# Patient Record
Sex: MALE | Race: WHITE
[De-identification: names, ages, dates, MRNs, and addresses within clinical notes are randomized per-mention and may not be internally consistent; named-entity substitution may affect disease eponyms.]

---

## 2017-05-17 ENCOUNTER — HOSPITAL ENCOUNTER (OUTPATIENT)
Dept: HOSPITAL 47 - LABWHC1 | Age: 27
Discharge: HOME | End: 2017-05-17
Payer: MEDICARE

## 2017-05-17 DIAGNOSIS — G40.813: Primary | ICD-10-CM

## 2017-05-17 LAB
ALT SERPL-CCNC: 28 U/L (ref 21–72)
AST SERPL-CCNC: 23 U/L (ref 17–59)
BASOPHILS # BLD AUTO: 0 K/UL (ref 0–0.2)
BASOPHILS NFR BLD AUTO: 1 %
CH: 33.7
CHCM: 34.3
EOSINOPHIL # BLD AUTO: 0.2 K/UL (ref 0–0.7)
EOSINOPHIL NFR BLD AUTO: 4 %
ERYTHROCYTE [DISTWIDTH] IN BLOOD BY AUTOMATED COUNT: 3.95 M/UL (ref 4.3–5.9)
ERYTHROCYTE [DISTWIDTH] IN BLOOD: 16.3 % (ref 11.5–15.5)
HCT VFR BLD AUTO: 39 % (ref 39–53)
HDW: 3.69
HGB BLD-MCNC: 12.7 GM/DL (ref 13–17.5)
LUC NFR BLD AUTO: 2 %
LYMPHOCYTES # SPEC AUTO: 1.6 K/UL (ref 1–4.8)
LYMPHOCYTES NFR SPEC AUTO: 36 %
MCH RBC QN AUTO: 32.2 PG (ref 25–35)
MCHC RBC AUTO-ENTMCNC: 32.7 G/DL (ref 31–37)
MCV RBC AUTO: 98.6 FL (ref 80–100)
MONOCYTES # BLD AUTO: 0.4 K/UL (ref 0–1)
MONOCYTES NFR BLD AUTO: 8 %
NEUTROPHILS # BLD AUTO: 2.3 K/UL (ref 1.3–7.7)
NEUTROPHILS NFR BLD AUTO: 51 %
WBC # BLD AUTO: 0.07 10*3/UL
WBC # BLD AUTO: 4.5 K/UL (ref 3.8–10.6)
WBC (PEROX): 4.49

## 2017-05-17 PROCEDURE — 36415 COLL VENOUS BLD VENIPUNCTURE: CPT

## 2017-05-17 PROCEDURE — 85025 COMPLETE CBC W/AUTO DIFF WBC: CPT

## 2017-05-17 PROCEDURE — 84450 TRANSFERASE (AST) (SGOT): CPT

## 2017-05-17 PROCEDURE — 80177 DRUG SCRN QUAN LEVETIRACETAM: CPT

## 2017-05-17 PROCEDURE — 80164 ASSAY DIPROPYLACETIC ACD TOT: CPT

## 2017-05-17 PROCEDURE — 84460 ALANINE AMINO (ALT) (SGPT): CPT

## 2019-06-03 ENCOUNTER — HOSPITAL ENCOUNTER (EMERGENCY)
Dept: HOSPITAL 47 - EC | Age: 29
Discharge: HOME | End: 2019-06-03
Payer: MEDICARE

## 2019-06-03 VITALS
HEART RATE: 92 BPM | DIASTOLIC BLOOD PRESSURE: 53 MMHG | TEMPERATURE: 97.2 F | RESPIRATION RATE: 18 BRPM | SYSTOLIC BLOOD PRESSURE: 126 MMHG

## 2019-06-03 DIAGNOSIS — R41.82: ICD-10-CM

## 2019-06-03 DIAGNOSIS — G40.909: ICD-10-CM

## 2019-06-03 DIAGNOSIS — Z88.1: ICD-10-CM

## 2019-06-03 DIAGNOSIS — Z79.899: ICD-10-CM

## 2019-06-03 DIAGNOSIS — Z88.8: ICD-10-CM

## 2019-06-03 DIAGNOSIS — F84.0: ICD-10-CM

## 2019-06-03 DIAGNOSIS — J95.09: Primary | ICD-10-CM

## 2019-06-03 DIAGNOSIS — Z88.0: ICD-10-CM

## 2019-06-03 PROCEDURE — 99283 EMERGENCY DEPT VISIT LOW MDM: CPT

## 2019-06-03 NOTE — ED
General Adult HPI





- General


Chief complaint: Recheck/Abnormal Lab/Rx


Stated complaint: Pulled trac out


Time Seen by Provider: 06/03/19 16:55


Source: Caregiver


Mode of arrival: ambulatory


Limitations: altered mental status, physical limitation





- History of Present Illness


Initial comments: 





Patient is a 29-year-old male presenting to emergency department with his 

caregiver after pulling out his tracheostomy tube.  Caregiver notes the trach 

was removed at noon and she did not attempt to replace it..  Caregiver reports 

trach placement approximately 1 year ago.  Caregiver notes she brought a new 

tracheostomy to for replacement as she did not attempted replacement.  Caregiver

denies giving the patient any medication to alleviate his symptoms.  Caregiver 

denies decreased oxygen, shortness of breath, chest tightness, chest pain.  

Caregiver reports patient is acting at his baseline.





- Related Data


                                Home Medications











 Medication  Instructions  Recorded  Confirmed


 


Clobazam [Onfi] 10 mg PEG/G-TUBE BID 01/30/16 06/03/19


 


Lacosamide [Vimpat] 200 mg PEG/G-TUBE TID 01/30/16 06/03/19


 


Ascorbic Acid [Vitamin C] 500 mg PEG/G-TUBE BID 06/03/19 06/03/19


 


Banzel 400mg 1,600 mg PEG/G-TUBE BID 06/03/19 06/03/19


 


Folic Acid 1 mg PEG/G-TUBE DAILY 06/03/19 06/03/19


 


Magnesium Hydroxide [Milk of 4,800 mg PEG/G-TUBE MOWEFR 06/03/19 06/03/19





Magnesia]   


 


Metoprolol Tartrate [Lopressor] 25 mg PEG/G-TUBE BID 06/03/19 06/03/19


 


Pantoprazole Sodium [Protonix] 40 mg PEG/G-TUBE BID 06/03/19 06/03/19


 


Polyethylene Glycol 3350 [Miralax] 17 gm PEG/G-TUBE DAILY 06/03/19 06/03/19


 


Vitamin B Complex 1 cap PEG/G-TUBE DAILY 06/03/19 06/03/19


 


clonazePAM [KlonoPIN] 1 mg PEG/G-TUBE TID 06/03/19 06/03/19


 


levETIRAcetam [Keppra Oral 2,000 mg PEG/G-TUBE TID 06/03/19 06/03/19





Solution]   











                                    Allergies











Allergy/AdvReac Type Severity Reaction Status Date / Time


 


amoxicillin trihydrate Allergy  Unknown Verified 06/03/19 18:05





[From Augmentin]     


 


ceftriaxone sodium Allergy  Unknown Verified 06/03/19 18:05





[From Rocephin]     


 


cephalexin monohydrate Allergy  Unknown Verified 06/03/19 18:05





[From Keflex]     


 


Penicillins Allergy  Unknown Verified 06/03/19 18:05


 


phenobarbital Allergy  Unknown Verified 06/03/19 18:05


 


phenytoin sodium Allergy  Unknown Verified 06/03/19 18:05





[From Dilantin]     


 


phenytoin sodium extended Allergy  Unknown Verified 06/03/19 18:05





[From Dilantin]     


 


potassium clavulanate Allergy  Unknown Verified 06/03/19 18:05





[From Augmentin]     


 


Cephalosporins AdvReac  Unknown Verified 06/03/19 18:05


 


lorazepam [From Ativan] AdvReac  Unknown Verified 06/03/19 18:05














Review of Systems


ROS Statement: 


Those systems with pertinent positive or pertinent negative responses have been 

documented in the HPI.





ROS Other: All systems not noted in ROS Statement are negative.





Past Medical History


Past Medical History: Asthma, Respiratory Disorder, Seizure Disorder


Additional Past Medical History / Comment(s): developmental delay, autism


History of Any Multi-Drug Resistant Organisms: None Reported


Additional Past Surgical History / Comment(s): vagal nerve stimulator 06/2015, 

left ureter stent, embolist surgery, heel cord lengthening


Past Psychological History: ADD/ADHD


Smoking Status: Never smoker


Past Alcohol Use History: None Reported


Past Drug Use History: None Reported





General Exam


Limitations: altered mental status, physical limitation


General appearance: alert, in no apparent distress


Head exam: Present: atraumatic, normocephalic, normal inspection


Eye exam: Present: normal appearance, PERRL, EOMI


Pupils: Present: normal accommodation


Neck exam: Present: other (Tracheostomy)


Respiratory exam: Present: normal lung sounds bilaterally


Cardiovascular Exam: Present: regular rate, normal rhythm, normal heart sounds


Neurological exam: Present: alert, oriented X3


Psychiatric exam: Present: normal affect, normal mood


Skin exam: Present: warm, intact, normal color





Course





                                   Vital Signs











  06/03/19





  16:14


 


Temperature 97.2 F L


 


Pulse Rate 92


 


Respiratory 18





Rate 


 


Blood Pressure 126/53


 


O2 Sat by Pulse 98





Oximetry 














Procedures





- Procedures


Initial comment: 





Tracheostomy tube placement by respiratory.





Medical Decision Making





- Medical Decision Making





Patient is a 29-year-old male presenting to emergency department with his 

caregiver after pulling out his stool.  Tracheostomy tube replacement was 

performed by Lulu from respiratory department.  Patient will be discharged as he

is acting at his baseline.  Caregiver advised to follow-up with primary care.  

Caregiver advised to return to emergency department if symptoms worsen.  Case 

discussed with physician.





Disposition


Clinical Impression: 


 Tracheostomy complication, unspecified





Disposition: HOME SELF-CARE


Condition: Stable


Additional Instructions: 


Please follow-up with primary care.  Patient to emergency department if symptoms

worsen.  Please follow proper tracheostomy tube care.


Is patient prescribed a controlled substance at d/c from ED?: No


Referrals: 


Micaela Bean MD [Primary Care Provider] - 1-2 days


Time of Disposition: 19:13

## 2019-06-22 ENCOUNTER — HOSPITAL ENCOUNTER (EMERGENCY)
Dept: HOSPITAL 47 - EC | Age: 29
Discharge: HOME | End: 2019-06-22
Payer: MEDICARE

## 2019-06-22 VITALS — DIASTOLIC BLOOD PRESSURE: 63 MMHG | SYSTOLIC BLOOD PRESSURE: 100 MMHG | HEART RATE: 70 BPM | TEMPERATURE: 98.2 F

## 2019-06-22 VITALS — RESPIRATION RATE: 18 BRPM

## 2019-06-22 DIAGNOSIS — Z88.1: ICD-10-CM

## 2019-06-22 DIAGNOSIS — G40.909: ICD-10-CM

## 2019-06-22 DIAGNOSIS — Z79.899: ICD-10-CM

## 2019-06-22 DIAGNOSIS — R33.9: Primary | ICD-10-CM

## 2019-06-22 DIAGNOSIS — I95.9: ICD-10-CM

## 2019-06-22 DIAGNOSIS — Z88.8: ICD-10-CM

## 2019-06-22 DIAGNOSIS — Z88.0: ICD-10-CM

## 2019-06-22 LAB
PH UR: 6 [PH] (ref 5–8)
SP GR UR: 1.01 (ref 1–1.03)
UROBILINOGEN UR QL STRIP: <2 MG/DL (ref ?–2)

## 2019-06-22 PROCEDURE — 99285 EMERGENCY DEPT VISIT HI MDM: CPT

## 2019-06-22 PROCEDURE — 96372 THER/PROPH/DIAG INJ SC/IM: CPT

## 2019-06-22 PROCEDURE — 51798 US URINE CAPACITY MEASURE: CPT

## 2019-06-22 PROCEDURE — 81003 URINALYSIS AUTO W/O SCOPE: CPT

## 2019-06-22 PROCEDURE — 51702 INSERT TEMP BLADDER CATH: CPT

## 2019-06-22 NOTE — ED
General Adult HPI





- General


Chief complaint: Urogenital


Stated complaint: Hypotensive


Time Seen by Provider: 06/22/19 17:00


Source: patient


Mode of arrival: wheelchair


Limitations: no limitations





- History of Present Illness


Initial comments: 





Patient is a 29 year-old autistic male with a complex medical history that is 

presenting for urinary retention.  Caregiver states that the patient has not 

been able to urinate since early this morning even though he has been ingesting 

fluids.  Caregiver reports a previous case of urinary retention and which they 

found the underlying cause to be a kidney stone.  Patient is nonverbal.  

Caregiver states the patient has been in discomfort.  Caregiver denies nausea, 

vomiting, diarrhea.  Caregiver denies giving the patient medication to alleviate

the symptoms.





- Related Data


                                Home Medications











 Medication  Instructions  Recorded  Confirmed


 


Clobazam [Onfi] 10 mg PEG/G-TUBE BID 01/30/16 06/03/19


 


Lacosamide [Vimpat] 200 mg PEG/G-TUBE TID 01/30/16 06/03/19


 


Ascorbic Acid [Vitamin C] 500 mg PEG/G-TUBE BID 06/03/19 06/03/19


 


Banzel 400mg 1,600 mg PEG/G-TUBE BID 06/03/19 06/03/19


 


Folic Acid 1 mg PEG/G-TUBE DAILY 06/03/19 06/03/19


 


Magnesium Hydroxide [Milk of 4,800 mg PEG/G-TUBE MOWEFR 06/03/19 06/03/19





Magnesia]   


 


Metoprolol Tartrate [Lopressor] 25 mg PEG/G-TUBE BID 06/03/19 06/03/19


 


Pantoprazole Sodium [Protonix] 40 mg PEG/G-TUBE BID 06/03/19 06/03/19


 


Polyethylene Glycol 3350 [Miralax] 17 gm PEG/G-TUBE DAILY 06/03/19 06/03/19


 


Vitamin B Complex 1 cap PEG/G-TUBE DAILY 06/03/19 06/03/19


 


clonazePAM [KlonoPIN] 1 mg PEG/G-TUBE TID 06/03/19 06/03/19


 


levETIRAcetam [Keppra Oral 2,000 mg PEG/G-TUBE TID 06/03/19 06/03/19





Solution]   











                                    Allergies











Allergy/AdvReac Type Severity Reaction Status Date / Time


 


amoxicillin trihydrate Allergy  Unknown Verified 06/22/19 17:06





[From Augmentin]     


 


ceftriaxone sodium Allergy  Unknown Verified 06/22/19 17:06





[From Rocephin]     


 


cephalexin monohydrate Allergy  Unknown Verified 06/22/19 17:06





[From Keflex]     


 


Penicillins Allergy  Unknown Verified 06/22/19 17:06


 


phenobarbital Allergy  Unknown Verified 06/22/19 17:06


 


phenytoin sodium Allergy  Unknown Verified 06/22/19 17:06





[From Dilantin]     


 


phenytoin sodium extended Allergy  Unknown Verified 06/22/19 17:06





[From Dilantin]     


 


potassium clavulanate Allergy  Unknown Verified 06/22/19 17:06





[From Augmentin]     


 


Cephalosporins AdvReac  Unknown Verified 06/22/19 17:06


 


lorazepam [From Ativan] AdvReac  Unknown Verified 06/22/19 17:06














Review of Systems


ROS Statement: 


Those systems with pertinent positive or pertinent negative responses have been 

documented in the HPI.





ROS Other: All systems not noted in ROS Statement are negative.





Past Medical History


Past Medical History: Asthma, Respiratory Disorder, Seizure Disorder


Additional Past Medical History / Comment(s): developmental delay, autism


History of Any Multi-Drug Resistant Organisms: None Reported


Additional Past Surgical History / Comment(s): vagal nerve stimulator 06/2015, 

left ureter stent, embolist surgery, heel cord lengthening


Past Psychological History: ADD/ADHD


Smoking Status: Never smoker


Past Alcohol Use History: None Reported


Past Drug Use History: None Reported





General Exam


Limitations: language barrier, physical limitation


General appearance: alert, in no apparent distress


Head exam: Present: atraumatic, normal inspection


Eye exam: Present: normal appearance


ENT exam: Present: normal exam, other (Tracheostomy)


Neck exam: Present: normal inspection


Respiratory exam: Present: normal lung sounds bilaterally


Cardiovascular Exam: Present: regular rate, normal rhythm, normal heart sounds


GI/Abdominal exam: Present: soft, other (Suprapubic discomfort on palpation)


Extremities exam: Present: normal inspection


Back exam: Present: normal inspection


Neurological exam: Present: alert, oriented X3


Psychiatric exam: Present: normal affect, normal mood


Skin exam: Present: warm, intact, normal color





Course


                                   Vital Signs











  06/22/19 06/22/19 06/22/19





  17:01 17:40 20:22


 


Temperature 97.9 F  


 


Pulse Rate 66 80 76


 


Respiratory 16 20 18





Rate   


 


Blood Pressure 91/59 108/71 99/58


 


O2 Sat by Pulse 100 100 100





Oximetry   














  06/22/19





  21:27


 


Temperature 


 


Pulse Rate 63


 


Respiratory 18





Rate 


 


Blood Pressure 105/59


 


O2 Sat by Pulse 98





Oximetry 














Procedures





- Catheter Insertion (Urinary)


Indications: to alleviate urinary retention


Does Patient Have: no prosthetic heart valve, no penile implant, no artificial 

urethral sphincter


Prophylactic Antibiotics Given: No


Bladder Scan/US before Catheterization: Yes (900 ml)


Preparation: Povidone-Iodine


Type of Catheter Inserted: Barahona


Catheter Kazakh Size: 14


Catheter Balloon Size (mLs): 10


Topical Anesthesia Used: Yes (Lidocaine)


Results: successfully catheterized-immediate flow


Patient Tolerated Procedure: well


Complications: none





Medical Decision Making





- Medical Decision Making





Patient is a 29-year-old male presents emergency Department with urinary 

retention.  Bladder scan is showing approximately 900 mL of fluid.  Barahona 

catheter placement was attempted 3 times even with this smallest size.  HEENT 

attempts failed due to an obstruction.  The urine was removed.  The Barahona 

appears to be coiling in the urethra once it approaches the obstruction site.  

Patient was given morphine for symptomatically relief. Urology was counseled of 

the case and they suggested using lidocaine gel on the catheter tip.  I 

performed another catheter attempt with lidocaine cream which was successful.  

Patient has drained over 900 mL of urine.  Patient will be discharged with c

atheter in place.  Caregiver advised to follow up with urology.  Strict return 

parameters were thoroughly discussed with caregiver was understandable and 

agrees.  Case discussed with Dr. Evans who is in agreement with the treatment 

plan.





Disposition


Clinical Impression: 


 Urinary retention





Disposition: HOME SELF-CARE


Condition: Stable


Instructions (If sedation given, give patient instructions):  Urinary Tract 

Infection in Men (ED)


Additional Instructions: 


Please follow up with urology.  Please follow proper catheter care instructions.

 Please return to emergency department if symptoms worsen.


Is patient prescribed a controlled substance at d/c from ED?: No


Referrals: 


Micaela Bean MD [Primary Care Provider] - 1-2 days


Jayden Pinon MD [STAFF PHYSICIAN] - 1-2 days


Yakov Spain MD [STAFF PHYSICIAN] - 1-2 days

## 2019-07-19 ENCOUNTER — HOSPITAL ENCOUNTER (OUTPATIENT)
Dept: HOSPITAL 47 - RADUSWWP | Age: 29
End: 2019-07-19
Attending: UROLOGY
Payer: MEDICARE

## 2019-07-19 DIAGNOSIS — Z88.1: ICD-10-CM

## 2019-07-19 DIAGNOSIS — Z88.0: ICD-10-CM

## 2019-07-19 DIAGNOSIS — Z88.8: ICD-10-CM

## 2019-07-19 DIAGNOSIS — N13.30: Primary | ICD-10-CM

## 2019-07-19 PROCEDURE — 76770 US EXAM ABDO BACK WALL COMP: CPT

## 2019-07-19 NOTE — US
EXAMINATION TYPE: US kidneys/renal and bladder

 

DATE OF EXAM: 7/19/2019

 

COMPARISON: Prior ultrasound dated August 2002

 

CLINICAL HISTORY: N13.30 Unspecified hydronephrosis. History of UPJ obstruction with stent, left side
 in 2005, History of urinary retention and hydronephrosis. 

 

EXAM MEASUREMENTS:

 

Right Kidney:  11.6 x 5.8 x 5.6 cm

Left Kidney: 13.1 x 7.5 x 7.4 cm cm

 

 

 

 

Right Kidney: There is echogenic focus upper pole with shadowing = 1.0 cm suggesting parenchymal calc
ification, mild hydronephrosis is present

Left Kidney: Hydronephrosis is moderate to severe on the left exam

Bladder: distended with thickened wall =0.7 cm,  some internal debris suspected. 

**Bilateral Jets seen: No

**Normal Post Void Residual: Unable to get patient to void. 

 

Exam limited d/t special needs patient. Patient unable to hold breath, or urinate. 

 

IMPRESSION:

Bilateral hydronephrosis left greater than right.

## 2019-08-02 ENCOUNTER — HOSPITAL ENCOUNTER (OUTPATIENT)
Dept: HOSPITAL 47 - RADNMMAIN | Age: 29
Discharge: HOME | End: 2019-08-02
Attending: UROLOGY
Payer: MEDICARE

## 2019-08-02 DIAGNOSIS — Z88.0: ICD-10-CM

## 2019-08-02 DIAGNOSIS — Z88.2: ICD-10-CM

## 2019-08-02 DIAGNOSIS — N13.30: Primary | ICD-10-CM

## 2019-08-02 DIAGNOSIS — Z88.1: ICD-10-CM

## 2019-08-02 DIAGNOSIS — Z88.8: ICD-10-CM

## 2019-08-02 PROCEDURE — 78708 K FLOW/FUNCT IMAGE W/DRUG: CPT

## 2019-08-05 NOTE — NM
EXAMINATION TYPE: NM lasix renogram

 

DATE OF EXAM: 8/2/2019

 

COMPARISON: NONE

 

HISTORY: Hydronephrosis

 

Following administration of 10 mCi Tc 99m MAG3 with 20mg Lasix. Immediate images post injection

 

FINDINGS: 

 

Left: 47.8 %. 

Right: 52.2 %. 

 

Max renal flow left: 9.5 minutes. 

Max renal flow right: 52.2 minutes.

 

Satisfactory accumulation of radiotracer within both renal collecting systems. After the administrati
on of Lasix, there is prompt excretion from both collecting systems. 

 

T 1/2 left: Not obtainable minutes. 

T 1/2 right: Not obtainable minutes. 

 

IMPRESSION:

1. Limited exam as discussed above. Split renal function is 48% on the left and 52% on the right.

2. Findings compatible with bilateral hydronephrosis with prolonged time of clearance of radiotracer.

## 2019-08-18 ENCOUNTER — HOSPITAL ENCOUNTER (EMERGENCY)
Dept: HOSPITAL 47 - EC | Age: 29
Discharge: HOME | End: 2019-08-18
Payer: MEDICARE

## 2019-08-18 VITALS
SYSTOLIC BLOOD PRESSURE: 115 MMHG | TEMPERATURE: 98.8 F | DIASTOLIC BLOOD PRESSURE: 80 MMHG | RESPIRATION RATE: 19 BRPM | HEART RATE: 78 BPM

## 2019-08-18 DIAGNOSIS — Z88.0: ICD-10-CM

## 2019-08-18 DIAGNOSIS — Z88.8: ICD-10-CM

## 2019-08-18 DIAGNOSIS — Z93.1: ICD-10-CM

## 2019-08-18 DIAGNOSIS — Z79.899: ICD-10-CM

## 2019-08-18 DIAGNOSIS — F84.0: ICD-10-CM

## 2019-08-18 DIAGNOSIS — R10.9: ICD-10-CM

## 2019-08-18 DIAGNOSIS — R14.0: ICD-10-CM

## 2019-08-18 DIAGNOSIS — Z88.1: ICD-10-CM

## 2019-08-18 DIAGNOSIS — G40.909: ICD-10-CM

## 2019-08-18 DIAGNOSIS — R33.9: Primary | ICD-10-CM

## 2019-08-18 LAB
ALBUMIN SERPL-MCNC: 4.1 G/DL (ref 3.5–5)
ALP SERPL-CCNC: 97 U/L (ref 38–126)
ALT SERPL-CCNC: 23 U/L (ref 21–72)
ANION GAP SERPL CALC-SCNC: 10 MMOL/L
AST SERPL-CCNC: 18 U/L (ref 17–59)
BASOPHILS # BLD AUTO: 0 K/UL (ref 0–0.2)
BASOPHILS NFR BLD AUTO: 0 %
BUN SERPL-SCNC: 12 MG/DL (ref 9–20)
CALCIUM SPEC-MCNC: 9.9 MG/DL (ref 8.4–10.2)
CHLORIDE SERPL-SCNC: 105 MMOL/L (ref 98–107)
CO2 SERPL-SCNC: 26 MMOL/L (ref 22–30)
EOSINOPHIL # BLD AUTO: 0.1 K/UL (ref 0–0.7)
EOSINOPHIL NFR BLD AUTO: 2 %
ERYTHROCYTE [DISTWIDTH] IN BLOOD BY AUTOMATED COUNT: 3.89 M/UL (ref 4.3–5.9)
ERYTHROCYTE [DISTWIDTH] IN BLOOD: 15.8 % (ref 11.5–15.5)
GLUCOSE SERPL-MCNC: 102 MG/DL (ref 74–99)
HCT VFR BLD AUTO: 38.3 % (ref 39–53)
HGB BLD-MCNC: 12.9 GM/DL (ref 13–17.5)
LYMPHOCYTES # SPEC AUTO: 1 K/UL (ref 1–4.8)
LYMPHOCYTES NFR SPEC AUTO: 15 %
MCH RBC QN AUTO: 33.1 PG (ref 25–35)
MCHC RBC AUTO-ENTMCNC: 33.6 G/DL (ref 31–37)
MCV RBC AUTO: 98.5 FL (ref 80–100)
MONOCYTES # BLD AUTO: 0.3 K/UL (ref 0–1)
MONOCYTES NFR BLD AUTO: 5 %
NEUTROPHILS # BLD AUTO: 5 K/UL (ref 1.3–7.7)
NEUTROPHILS NFR BLD AUTO: 78 %
PLATELET # BLD AUTO: 232 K/UL (ref 150–450)
POTASSIUM SERPL-SCNC: 3.9 MMOL/L (ref 3.5–5.1)
PROT SERPL-MCNC: 7.1 G/DL (ref 6.3–8.2)
SODIUM SERPL-SCNC: 141 MMOL/L (ref 137–145)
WBC # BLD AUTO: 6.5 K/UL (ref 3.8–10.6)

## 2019-08-18 PROCEDURE — 80053 COMPREHEN METABOLIC PANEL: CPT

## 2019-08-18 PROCEDURE — 99284 EMERGENCY DEPT VISIT MOD MDM: CPT

## 2019-08-18 PROCEDURE — 36415 COLL VENOUS BLD VENIPUNCTURE: CPT

## 2019-08-18 PROCEDURE — 51702 INSERT TEMP BLADDER CATH: CPT

## 2019-08-18 PROCEDURE — 85025 COMPLETE CBC W/AUTO DIFF WBC: CPT

## 2019-08-18 PROCEDURE — 51798 US URINE CAPACITY MEASURE: CPT

## 2019-08-18 PROCEDURE — 74018 RADEX ABDOMEN 1 VIEW: CPT

## 2019-08-18 NOTE — XR
EXAMINATION TYPE: XR KUB

 

DATE OF EXAM: 8/18/2019

 

COMPARISON: 11/24/2014

 

HISTORY: Abnormal distention

 

TECHNIQUE: 2 views supine

 

FINDINGS: There is no sign of intestinal obstruction or pneumoperitoneum. Fecal pattern is fairly nor
mal. There are clips in the left upper quadrant. Bony structures are intact.

Gastrostomy tube is noted.

IMPRESSION: Nonacute abdomen.

## 2019-08-18 NOTE — ED
Abdominal Pain HPI





- General


Chief Complaint: Abdominal Pain


Stated Complaint: ABDOMINAL PAIN


Time Seen by Provider: 08/18/19 14:10


Source: patient


Mode of arrival: ambulatory


Limitations: no limitations





- History of Present Illness


Initial Comments: 





Patient is a 29-year-old male with a history of developmental delay, autism and 

epilepsy is presenting to emergency Department with his caregiver for a chief 

complaint of abdominal pain.  Caregiver reports patient takes MiraLAX daily and 

typically has bowel movements every 3 days.  Patient had a bowel movement early 

yesterday.  Caregiver reports she noticed abdominal distention today at 1200.  

Patient has a patent PEG tube according to the caregiver.  Caregiver denies any 

vomiting, fevers or chills.  Caregiver reports the patient typically urinates 

after 1500 after he receives his daily dose of Flomax 3 hours prior to that.  

Caregiver states the patient has not urinated since yesterday at 2200. 





- Related Data


                                Home Medications











 Medication  Instructions  Recorded  Confirmed


 


Clobazam [Onfi] 10 mg PEG/G-TUBE BID 01/30/16 06/03/19


 


Lacosamide [Vimpat] 200 mg PEG/G-TUBE TID 01/30/16 06/03/19


 


Ascorbic Acid [Vitamin C] 500 mg PEG/G-TUBE BID 06/03/19 06/03/19


 


Banzel 400mg 1,600 mg PEG/G-TUBE BID 06/03/19 06/03/19


 


Folic Acid 1 mg PEG/G-TUBE DAILY 06/03/19 06/03/19


 


Magnesium Hydroxide [Milk of 4,800 mg PEG/G-TUBE MOWEFR 06/03/19 06/03/19





Magnesia]   


 


Metoprolol Tartrate [Lopressor] 25 mg PEG/G-TUBE BID 06/03/19 06/03/19


 


Pantoprazole Sodium [Protonix] 40 mg PEG/G-TUBE BID 06/03/19 06/03/19


 


Polyethylene Glycol 3350 [Miralax] 17 gm PEG/G-TUBE DAILY 06/03/19 06/03/19


 


Vitamin B Complex 1 cap PEG/G-TUBE DAILY 06/03/19 06/03/19


 


clonazePAM [KlonoPIN] 1 mg PEG/G-TUBE TID 06/03/19 06/03/19


 


levETIRAcetam [Keppra Oral 2,000 mg PEG/G-TUBE TID 06/03/19 06/03/19





Solution]   











                                    Allergies











Allergy/AdvReac Type Severity Reaction Status Date / Time


 


amoxicillin trihydrate Allergy  Unknown Verified 08/18/19 14:08





[From Augmentin]     


 


ceftriaxone sodium Allergy  Unknown Verified 08/18/19 14:08





[From Rocephin]     


 


cephalexin monohydrate Allergy  Unknown Verified 08/18/19 14:08





[From Keflex]     


 


Penicillins Allergy  Unknown Verified 08/18/19 14:08


 


phenobarbital Allergy  Unknown Verified 08/18/19 14:08


 


phenytoin sodium Allergy  Unknown Verified 08/18/19 14:08





[From Dilantin]     


 


phenytoin sodium extended Allergy  Unknown Verified 08/18/19 14:08





[From Dilantin]     


 


potassium clavulanate Allergy  Unknown Verified 08/18/19 14:08





[From Augmentin]     


 


Cephalosporins AdvReac  Unknown Verified 08/18/19 14:08


 


lorazepam [From Ativan] AdvReac  Unknown Verified 08/18/19 14:08














Review of Systems


ROS Statement: 


Those systems with pertinent positive or pertinent negative responses have been 

documented in the HPI.





ROS Other: All systems not noted in ROS Statement are negative.





Past Medical History


Past Medical History: Asthma, Respiratory Disorder, Seizure Disorder


Additional Past Medical History / Comment(s): developmental delay, autism


History of Any Multi-Drug Resistant Organisms: None Reported


Additional Past Surgical History / Comment(s): vagal nerve stimulator 06/2015, 

left ureter stent, embolist surgery, heel cord lengthening


Past Psychological History: ADD/ADHD


Smoking Status: Never smoker


Past Alcohol Use History: None Reported


Past Drug Use History: None Reported





General Exam


Limitations: no limitations


General appearance: alert, in no apparent distress


Head exam: Present: atraumatic, normocephalic, normal inspection


Eye exam: Present: normal appearance, PERRL, EOMI


Pupils: Present: normal accommodation


ENT exam: Present: normal exam, mucous membranes moist, normal external ear exam


Neck exam: Present: normal inspection, full ROM


Respiratory exam: Present: normal lung sounds bilaterally


Cardiovascular Exam: Present: regular rate, normal rhythm, normal heart sounds


GI/Abdominal exam: Present: distended, other (PEG tube).  Absent: tenderness, 

guarding, mass, pulsatile mass


Extremities exam: Present: normal inspection, full ROM


Back exam: Present: normal inspection, full ROM


Neurological exam: Present: alert, oriented X3


Psychiatric exam: Present: normal affect, normal mood


Skin exam: Present: warm, intact, normal color





Course


                                   Vital Signs











  08/18/19 08/18/19





  14:06 17:04


 


Temperature 97.5 F L 98.8 F


 


Pulse Rate 102 H 78


 


Respiratory 20 19





Rate  


 


Blood Pressure 109/65 115/80


 


O2 Sat by Pulse 98 100





Oximetry  














Medical Decision Making





- Medical Decision Making





Patient is a 29-year-old male with history of developmental delay, autism and 

epilepsy presenting to emergency Department with his caregiver for a chief 

complaint of abdominal pain.  CBC and CMP are unremarkable.  KUB is showing no 

signs of intestinal obstruction.  I was able to put fluid through the PEG tube 

using a Esther syringe.  On reevaluation patient had urinated large amounts.  

Patient still had a post-residual volume of 700 mL.  Barahona catheter was placed 

using lidojet.  Patient was able to fully evacuate the bladder.  Repeat bladder 

scan showing urine of approximately 20 mL.  Caregiver is satisfied and is ready 

go home.  Strict return parameters were thoroughly discussed with caregiver who 

is understanding and agreeable.  Case discussed with physician.





- Lab Data


Result diagrams: 


                                 08/18/19 14:30





                                 08/18/19 14:30


                                   Lab Results











  08/18/19 08/18/19 Range/Units





  14:30 14:30 


 


WBC  6.5   (3.8-10.6)  k/uL


 


RBC  3.89 L   (4.30-5.90)  m/uL


 


Hgb  12.9 L   (13.0-17.5)  gm/dL


 


Hct  38.3 L   (39.0-53.0)  %


 


MCV  98.5   (80.0-100.0)  fL


 


MCH  33.1   (25.0-35.0)  pg


 


MCHC  33.6   (31.0-37.0)  g/dL


 


RDW  15.8 H   (11.5-15.5)  %


 


Plt Count  232   (150-450)  k/uL


 


Neutrophils %  78   %


 


Lymphocytes %  15   %


 


Monocytes %  5   %


 


Eosinophils %  2   %


 


Basophils %  0   %


 


Neutrophils #  5.0   (1.3-7.7)  k/uL


 


Lymphocytes #  1.0   (1.0-4.8)  k/uL


 


Monocytes #  0.3   (0-1.0)  k/uL


 


Eosinophils #  0.1   (0-0.7)  k/uL


 


Basophils #  0.0   (0-0.2)  k/uL


 


Poikilocytosis  Slight   


 


Macrocytosis  Slight   


 


Sodium   141  (137-145)  mmol/L


 


Potassium   3.9  (3.5-5.1)  mmol/L


 


Chloride   105  ()  mmol/L


 


Carbon Dioxide   26  (22-30)  mmol/L


 


Anion Gap   10  mmol/L


 


BUN   12  (9-20)  mg/dL


 


Creatinine   0.61 L  (0.66-1.25)  mg/dL


 


Est GFR (CKD-EPI)AfAm   >90  (>60 ml/min/1.73 sqM)  


 


Est GFR (CKD-EPI)NonAf   >90  (>60 ml/min/1.73 sqM)  


 


Glucose   102 H  (74-99)  mg/dL


 


Calcium   9.9  (8.4-10.2)  mg/dL


 


Total Bilirubin   0.5  (0.2-1.3)  mg/dL


 


AST   18  (17-59)  U/L


 


ALT   23  (21-72)  U/L


 


Alkaline Phosphatase   97  ()  U/L


 


Total Protein   7.1  (6.3-8.2)  g/dL


 


Albumin   4.1  (3.5-5.0)  g/dL














Disposition


Clinical Impression: 


 Urinary retention





Disposition: HOME SELF-CARE


Condition: Stable


Instructions (If sedation given, give patient instructions):  Urinary Retention 

in Men (ED)


Additional Instructions: 


Please follow with primary care.  Please return to emergency department if 

symptoms worsen.  Follow proper PEG tube instructions.


Is patient prescribed a controlled substance at d/c from ED?: No


Referrals: 


Micaela Bean MD [Primary Care Provider] - 1-2 days


Time of Disposition: 16:56

## 2019-09-28 ENCOUNTER — HOSPITAL ENCOUNTER (OUTPATIENT)
Dept: HOSPITAL 47 - LABWHC1 | Age: 29
Discharge: HOME | End: 2019-09-28
Attending: INTERNAL MEDICINE
Payer: MEDICARE

## 2019-09-28 DIAGNOSIS — R63.4: ICD-10-CM

## 2019-09-28 DIAGNOSIS — E61.1: Primary | ICD-10-CM

## 2019-09-28 LAB
ERYTHROCYTE [DISTWIDTH] IN BLOOD BY AUTOMATED COUNT: 4.4 M/UL (ref 4.3–5.9)
ERYTHROCYTE [DISTWIDTH] IN BLOOD: 15.7 % (ref 11.5–15.5)
HBA1C MFR BLD: 3.8 % (ref 4–6)
HCT VFR BLD AUTO: 43.4 % (ref 39–53)
HGB BLD-MCNC: 14.5 GM/DL (ref 13–17.5)
MCH RBC QN AUTO: 33.1 PG (ref 25–35)
MCHC RBC AUTO-ENTMCNC: 33.5 G/DL (ref 31–37)
MCV RBC AUTO: 98.7 FL (ref 80–100)
PLATELET # BLD AUTO: 199 K/UL (ref 150–450)
T4 FREE SERPL-MCNC: 1.2 NG/DL (ref 0.8–1.8)
WBC # BLD AUTO: 7.2 K/UL (ref 3.8–10.6)

## 2019-09-28 PROCEDURE — 83540 ASSAY OF IRON: CPT

## 2019-09-28 PROCEDURE — 80339 ANTIEPILEPTICS NOS 1-3: CPT

## 2019-09-28 PROCEDURE — 36415 COLL VENOUS BLD VENIPUNCTURE: CPT

## 2019-09-28 PROCEDURE — 80177 DRUG SCRN QUAN LEVETIRACETAM: CPT

## 2019-09-28 PROCEDURE — 84439 ASSAY OF FREE THYROXINE: CPT

## 2019-09-28 PROCEDURE — 84443 ASSAY THYROID STIM HORMONE: CPT

## 2019-09-28 PROCEDURE — 85027 COMPLETE CBC AUTOMATED: CPT

## 2019-09-28 PROCEDURE — 83036 HEMOGLOBIN GLYCOSYLATED A1C: CPT

## 2019-09-28 PROCEDURE — 83550 IRON BINDING TEST: CPT

## 2020-02-21 ENCOUNTER — HOSPITAL ENCOUNTER (OUTPATIENT)
Dept: HOSPITAL 47 - LABPAT | Age: 30
Discharge: HOME | End: 2020-02-21
Attending: UROLOGY
Payer: MEDICARE

## 2020-02-21 DIAGNOSIS — Z01.812: Primary | ICD-10-CM

## 2020-02-21 DIAGNOSIS — R39.14: ICD-10-CM

## 2020-02-21 PROCEDURE — 87186 SC STD MICRODIL/AGAR DIL: CPT

## 2020-02-21 PROCEDURE — 87077 CULTURE AEROBIC IDENTIFY: CPT

## 2020-02-21 PROCEDURE — 87086 URINE CULTURE/COLONY COUNT: CPT

## 2020-02-24 NOTE — P.GSHP
History of Present Illness


H&P Date: 02/24/20


Chief Complaint: Incomplete voiding


The patient is a 30-year-old male with a history of mental retardation and 

seizures who originally underwent an endopyelotomy for treatment of a left 

ureteropelvic junction obstruction in [place] in 2005.  A nonobstructive 

calculus was present in the left kidney at that time and was not treated.  MAG3 

diuretic renogram in 2005 continued to show caliectasis but there was no 

evidence of obstruction.  He has had problems with infrequent voiding over the 

last year.  He has been taken to the emergency room on several occasions for the

purpose of  catheterization.  He was seen by me in 6/2019.  Follow-up renal 

ultrasound on 7/19/2019 showed bilateral hydronephrosis which was worse on the 

left side.  Repeat MAG3 renogram with Lasix washout on each/2/2019 showed prompt

excretion from both kidneys.  The patient continued to have infrequent voiding 

and constipation.  According to his caregiver he can go 18-27 hours without 

voiding.  Usually has a bowel movement every 3 days.  I discussed showing the 

patient's caregiver had to catheterize him and he came to the office on 2/19 for

this purpose.  At that time I was unable to insert a 14 Serbian catheter.  

Cystoscopy showed a very tight bladder neck which was felt to be a contributory 

factor to his infrequent bladder emptying and bilateral hydronephrosis.  The 

patient had previously been given a trial of tamsulosin with no improvement.  It

was my feeling that transurethral incision of the bladder neck and prostate 

would be a better treatment option than intermittent catheterization or a 

chronic catheter.  The patient is admitted for this purpose.





The patient was noted to have ,000 colonies of Enterococcus faecalis on a 

voided urine culture done preop.  According to the caregiver the specimen may 

have been contaminated at that time.  Due to the uncertainty the patient has 

been started on Macrobid 100 mg twice a day and will also receive gentamicin IV 

preop.








- Review of Systems


ROS unobtainable: Reports: due to mental status





- Constitutional


Constitutional: Denies fever





- Cardiovascular


Cardiovascular: Denies shortness of breath, Denies syncope





- Respiratory


Respiratory: Denies cough, Denies wheezing





- Gastrointestinal


Gastrointestinal: Reports constipation





- Genitourinary (Male)


Genitourinary: Reports as per HPI





Past Medical History


Past Medical History: Asthma, Respiratory Disorder, Seizure Disorder


Additional Past Medical History / Comment(s): developmental delay, autism


History of Any Multi-Drug Resistant Organisms: None Reported


Additional Past Surgical History / Comment(s): vagal nerve stimulator 06/2015, 

left endopyelotomy for treatment of UPJ obstruction-2005, heel cord lengthening,

PEG tube


Past Psychological History: ADD/ADHD


Smoking Status: Never smoker


Past Alcohol Use History: None Reported


Past Drug Use History: None Reported





Medications and Allergies


                                Home Medications











 Medication  Instructions  Recorded  Confirmed  Type


 


Clobazam [Onfi] 10 mg PEG/G-TUBE BID 01/30/16 06/03/19 History


 


Lacosamide [Vimpat] 200 mg PEG/G-TUBE TID 01/30/16 06/03/19 History


 


Ascorbic Acid [Vitamin C] 500 mg PEG/G-TUBE BID 06/03/19 06/03/19 History


 


Banzel 400mg 1,600 mg PEG/G-TUBE BID 06/03/19 06/03/19 History


 


Folic Acid 1 mg PEG/G-TUBE DAILY 06/03/19 06/03/19 History


 


Magnesium Hydroxide [Milk of 4,800 mg PEG/G-TUBE MOWEFR 06/03/19 06/03/19 Hist

ory





Magnesia]    


 


Metoprolol Tartrate [Lopressor] 25 mg PEG/G-TUBE BID 06/03/19 06/03/19 History


 


Pantoprazole Sodium [Protonix] 40 mg PEG/G-TUBE BID 06/03/19 06/03/19 History


 


Polyethylene Glycol 3350 [Miralax] 17 gm PEG/G-TUBE DAILY 06/03/19 06/03/19 

History


 


Vitamin B Complex 1 cap PEG/G-TUBE DAILY 06/03/19 06/03/19 History


 


clonazePAM [KlonoPIN] 1 mg PEG/G-TUBE TID 06/03/19 06/03/19 History


 


levETIRAcetam [Keppra Oral 2,000 mg PEG/G-TUBE TID 06/03/19 06/03/19 History





Solution]    








                                    Allergies











Allergy/AdvReac Type Severity Reaction Status Date / Time


 


amoxicillin trihydrate Allergy  Unknown Verified 08/18/19 14:08





[From Augmentin]     


 


ceftriaxone sodium Allergy  Unknown Verified 08/18/19 14:08





[From Rocephin]     


 


cephalexin monohydrate Allergy  Unknown Verified 08/18/19 14:08





[From Keflex]     


 


Penicillins Allergy  Unknown Verified 08/18/19 14:08


 


phenobarbital Allergy  Unknown Verified 08/18/19 14:08


 


phenytoin sodium Allergy  Unknown Verified 08/18/19 14:08





[From Dilantin]     


 


phenytoin sodium extended Allergy  Unknown Verified 08/18/19 14:08





[From Dilantin]     


 


potassium clavulanate Allergy  Unknown Verified 08/18/19 14:08





[From Augmentin]     


 


Cephalosporins AdvReac  Unknown Verified 08/18/19 14:08


 


lorazepam [From Ativan] AdvReac  Unknown Verified 08/18/19 14:08














Surgical - Exam





- General


well developed, well nourished





- ENT


no hearing loss





- Neck


no masses, no lymphadectomy





- Respiratory


normal expansion, normal respiratory effort, clear to auscultation





- Cardiovascular


Rhythm: regular


Abnormal Heart Sounds: no systolic murmur, no diastolic murmur





- Abdomen


Abdomen: soft, no organomegaly


Hernia: none





- Genitourinary


normal penis with no external lesions, testicles non-tender





Assessment and Plan


(1) Incomplete bladder emptying


Narrative/Plan: 


The patient will undergo transurethral incision of the bladder neck and prostate

under general anesthesia.  His caregiver and guardian is aware of the operative 

risks which include anesthesia, bleeding, infection and persistent incomplete 

and/or infrequent bladder emptying. 


Status: Acute   Code(s): R33.9 - RETENTION OF URINE, UNSPECIFIED   SNOMED C

ode(s): [id number]

## 2020-02-26 ENCOUNTER — HOSPITAL ENCOUNTER (OUTPATIENT)
Dept: HOSPITAL 47 - OR | Age: 30
Discharge: HOME | End: 2020-02-26
Attending: UROLOGY
Payer: MEDICARE

## 2020-02-26 VITALS — RESPIRATION RATE: 18 BRPM | DIASTOLIC BLOOD PRESSURE: 66 MMHG | SYSTOLIC BLOOD PRESSURE: 107 MMHG | HEART RATE: 48 BPM

## 2020-02-26 VITALS — BODY MASS INDEX: 22.6 KG/M2

## 2020-02-26 VITALS — TEMPERATURE: 97 F

## 2020-02-26 DIAGNOSIS — Z88.0: ICD-10-CM

## 2020-02-26 DIAGNOSIS — Z88.8: ICD-10-CM

## 2020-02-26 DIAGNOSIS — F79: ICD-10-CM

## 2020-02-26 DIAGNOSIS — R33.8: ICD-10-CM

## 2020-02-26 DIAGNOSIS — G40.909: ICD-10-CM

## 2020-02-26 DIAGNOSIS — F84.0: ICD-10-CM

## 2020-02-26 DIAGNOSIS — I10: ICD-10-CM

## 2020-02-26 DIAGNOSIS — Z93.0: ICD-10-CM

## 2020-02-26 DIAGNOSIS — K21.9: ICD-10-CM

## 2020-02-26 DIAGNOSIS — J45.909: ICD-10-CM

## 2020-02-26 DIAGNOSIS — N32.89: Primary | ICD-10-CM

## 2020-02-26 DIAGNOSIS — Z96.82: ICD-10-CM

## 2020-02-26 DIAGNOSIS — Z79.899: ICD-10-CM

## 2020-02-26 PROCEDURE — 52450 TRANSURETHRAL INC PROSTATE: CPT

## 2020-02-26 PROCEDURE — 88305 TISSUE EXAM BY PATHOLOGIST: CPT

## 2020-02-26 NOTE — P.OP
Date of Procedure: 02/26/20


Preoperative Diagnosis: 


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


Postoperative Diagnosis: 


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


Procedure(s) Performed: 


Transurethral incision of vesical neck and prostate


Anesthesia: VANESSA


Surgeon: Jayden Pinon


Estimated Blood Loss (ml): 30


Pathology: other (Fragments of bladder neck)


Condition: stable


Disposition: PACU


Indications for Procedure: 


The patient is a 30-year-old male with mental retardation who has developed 

bilateral hydronephrosis secondary to infrequent bladder emptying and incomplete

bladder emptying.  Transurethral incision of the bladder neck and prostate is 

planned to reduce any potential bladder outflow obstruction.


Description of Procedure: 


The patient was taken the operating suite where adequate general anesthesia via 

intubation of his tracheostomy was performed.  He was placed in the dorsal 

lithotomy position with his legs suspended from padded Jose Luis stirrups.  

Pneumatic compression stockings were applied to the lower legs.  The genitalia 

was prepped Betadine solution and draped in sterile fashion.  A 25-Kyrgyz 

resectoscope sheath with visual obturator and 30 lens was passed through the 

urethra and into the bladder.  The anterior urethra was unremarkable.  The 

prostatic urethra was short with marked elevation of the bladder neck.  The 

bladder was examined.  Both ureteral orifices were of normal location and 

configuration.  The bladder was free of tumor foreign body and diverticulum.





Unfortunately a Piotr knife was not available and for that reason a 24-Kyrgyz

resectoscope loop was modified into a V shape.  Using the resectoscope and loop 

incisions were made through the bladder neck and prostatic urethra at 5 and 

7:00.  Bleeding vessels were controlled using electrocautery.  The tissue 

resected between the loop was removed from the bladder through the resectoscope.

 After ensuring adequate hemostasis the resectoscope was removed.  A 16-Kyrgyz 

coud catheter was inserted and left to gravity drainage.





Patient tolerated procedure well and left the operative room awake and in 

satisfactory condition.  His catheter will be removed in 2 days provided that 

his urine remains clear.

## 2020-03-15 ENCOUNTER — HOSPITAL ENCOUNTER (INPATIENT)
Dept: HOSPITAL 47 - EC | Age: 30
LOS: 9 days | Discharge: HOME HEALTH | DRG: 388 | End: 2020-03-24
Attending: HOSPITALIST | Admitting: HOSPITALIST
Payer: MEDICARE

## 2020-03-15 VITALS — BODY MASS INDEX: 23.3 KG/M2

## 2020-03-15 DIAGNOSIS — Z43.1: ICD-10-CM

## 2020-03-15 DIAGNOSIS — J69.0: ICD-10-CM

## 2020-03-15 DIAGNOSIS — F84.0: ICD-10-CM

## 2020-03-15 DIAGNOSIS — E87.6: ICD-10-CM

## 2020-03-15 DIAGNOSIS — D64.9: ICD-10-CM

## 2020-03-15 DIAGNOSIS — Z88.8: ICD-10-CM

## 2020-03-15 DIAGNOSIS — K56.41: Primary | ICD-10-CM

## 2020-03-15 DIAGNOSIS — Z43.0: ICD-10-CM

## 2020-03-15 DIAGNOSIS — Q43.8: ICD-10-CM

## 2020-03-15 DIAGNOSIS — N13.6: ICD-10-CM

## 2020-03-15 DIAGNOSIS — Z81.8: ICD-10-CM

## 2020-03-15 DIAGNOSIS — Z16.23: ICD-10-CM

## 2020-03-15 DIAGNOSIS — G93.41: ICD-10-CM

## 2020-03-15 DIAGNOSIS — F90.9: ICD-10-CM

## 2020-03-15 DIAGNOSIS — J45.909: ICD-10-CM

## 2020-03-15 DIAGNOSIS — B95.2: ICD-10-CM

## 2020-03-15 DIAGNOSIS — F79: ICD-10-CM

## 2020-03-15 DIAGNOSIS — G80.9: ICD-10-CM

## 2020-03-15 DIAGNOSIS — Z88.0: ICD-10-CM

## 2020-03-15 DIAGNOSIS — Z88.1: ICD-10-CM

## 2020-03-15 DIAGNOSIS — G40.209: ICD-10-CM

## 2020-03-15 DIAGNOSIS — K44.9: ICD-10-CM

## 2020-03-15 DIAGNOSIS — N32.89: ICD-10-CM

## 2020-03-15 DIAGNOSIS — K92.2: ICD-10-CM

## 2020-03-15 DIAGNOSIS — N30.00: ICD-10-CM

## 2020-03-15 DIAGNOSIS — K59.39: ICD-10-CM

## 2020-03-15 DIAGNOSIS — Z16.21: ICD-10-CM

## 2020-03-15 LAB
ALBUMIN SERPL-MCNC: 4.3 G/DL (ref 3.5–5)
ALP SERPL-CCNC: 129 U/L (ref 38–126)
ALT SERPL-CCNC: 13 U/L (ref 4–49)
ANION GAP SERPL CALC-SCNC: 10 MMOL/L
APTT BLD: 25.4 SEC (ref 22–30)
AST SERPL-CCNC: 20 U/L (ref 17–59)
BASOPHILS # BLD AUTO: 0 K/UL (ref 0–0.2)
BASOPHILS NFR BLD AUTO: 0 %
BUN SERPL-SCNC: 19 MG/DL (ref 9–20)
CALCIUM SPEC-MCNC: 9.4 MG/DL (ref 8.4–10.2)
CHLORIDE SERPL-SCNC: 106 MMOL/L (ref 98–107)
CO2 SERPL-SCNC: 23 MMOL/L (ref 22–30)
EOSINOPHIL # BLD AUTO: 0.1 K/UL (ref 0–0.7)
EOSINOPHIL NFR BLD AUTO: 1 %
ERYTHROCYTE [DISTWIDTH] IN BLOOD BY AUTOMATED COUNT: 4.31 M/UL (ref 4.3–5.9)
ERYTHROCYTE [DISTWIDTH] IN BLOOD: 15.9 % (ref 11.5–15.5)
GLUCOSE SERPL-MCNC: 121 MG/DL (ref 74–99)
HCT VFR BLD AUTO: 43 % (ref 39–53)
HGB BLD-MCNC: 14.1 GM/DL (ref 13–17.5)
INR PPP: 0.9 (ref ?–1.2)
KETONES UR QL STRIP.AUTO: (no result)
LYMPHOCYTES # SPEC AUTO: 0.5 K/UL (ref 1–4.8)
LYMPHOCYTES NFR SPEC AUTO: 3 %
MAGNESIUM SPEC-SCNC: 2.1 MG/DL (ref 1.6–2.3)
MCH RBC QN AUTO: 32.7 PG (ref 25–35)
MCHC RBC AUTO-ENTMCNC: 32.8 G/DL (ref 31–37)
MCV RBC AUTO: 99.7 FL (ref 80–100)
MONOCYTES # BLD AUTO: 0.7 K/UL (ref 0–1)
MONOCYTES NFR BLD AUTO: 4 %
NEUTROPHILS # BLD AUTO: 15 K/UL (ref 1.3–7.7)
NEUTROPHILS NFR BLD AUTO: 92 %
PH UR: 6 [PH] (ref 5–8)
PLATELET # BLD AUTO: 269 K/UL (ref 150–450)
POTASSIUM SERPL-SCNC: 3.2 MMOL/L (ref 3.5–5.1)
PROT SERPL-MCNC: 6.8 G/DL (ref 6.3–8.2)
PROT UR QL: (no result)
PT BLD: 9.6 SEC (ref 9–12)
RBC UR QL: 57 /HPF (ref 0–5)
SODIUM SERPL-SCNC: 139 MMOL/L (ref 137–145)
SP GR UR: 1.05 (ref 1–1.03)
UROBILINOGEN UR QL STRIP: 2 MG/DL (ref ?–2)
WBC # BLD AUTO: 16.4 K/UL (ref 3.8–10.6)
WBC #/AREA URNS HPF: >182 /HPF (ref 0–5)

## 2020-03-15 PROCEDURE — 51702 INSERT TEMP BLADDER CATH: CPT

## 2020-03-15 PROCEDURE — 87040 BLOOD CULTURE FOR BACTERIA: CPT

## 2020-03-15 PROCEDURE — 74176 CT ABD & PELVIS W/O CONTRAST: CPT

## 2020-03-15 PROCEDURE — 96375 TX/PRO/DX INJ NEW DRUG ADDON: CPT

## 2020-03-15 PROCEDURE — 80053 COMPREHEN METABOLIC PANEL: CPT

## 2020-03-15 PROCEDURE — 85730 THROMBOPLASTIN TIME PARTIAL: CPT

## 2020-03-15 PROCEDURE — 87186 SC STD MICRODIL/AGAR DIL: CPT

## 2020-03-15 PROCEDURE — 74018 RADEX ABDOMEN 1 VIEW: CPT

## 2020-03-15 PROCEDURE — 71046 X-RAY EXAM CHEST 2 VIEWS: CPT

## 2020-03-15 PROCEDURE — 85025 COMPLETE CBC W/AUTO DIFF WBC: CPT

## 2020-03-15 PROCEDURE — 80202 ASSAY OF VANCOMYCIN: CPT

## 2020-03-15 PROCEDURE — 99291 CRITICAL CARE FIRST HOUR: CPT

## 2020-03-15 PROCEDURE — 87086 URINE CULTURE/COLONY COUNT: CPT

## 2020-03-15 PROCEDURE — 93005 ELECTROCARDIOGRAM TRACING: CPT

## 2020-03-15 PROCEDURE — 86901 BLOOD TYPING SEROLOGIC RH(D): CPT

## 2020-03-15 PROCEDURE — 94640 AIRWAY INHALATION TREATMENT: CPT

## 2020-03-15 PROCEDURE — 83605 ASSAY OF LACTIC ACID: CPT

## 2020-03-15 PROCEDURE — 84132 ASSAY OF SERUM POTASSIUM: CPT

## 2020-03-15 PROCEDURE — 82272 OCCULT BLD FECES 1-3 TESTS: CPT

## 2020-03-15 PROCEDURE — 96366 THER/PROPH/DIAG IV INF ADDON: CPT

## 2020-03-15 PROCEDURE — 3E0G76Z INTRODUCTION OF NUTRITIONAL SUBSTANCE INTO UPPER GI, VIA NATURAL OR ARTIFICIAL OPENING: ICD-10-PCS

## 2020-03-15 PROCEDURE — 96365 THER/PROPH/DIAG IV INF INIT: CPT

## 2020-03-15 PROCEDURE — 83735 ASSAY OF MAGNESIUM: CPT

## 2020-03-15 PROCEDURE — 81001 URINALYSIS AUTO W/SCOPE: CPT

## 2020-03-15 PROCEDURE — 87077 CULTURE AEROBIC IDENTIFY: CPT

## 2020-03-15 PROCEDURE — 86850 RBC ANTIBODY SCREEN: CPT

## 2020-03-15 PROCEDURE — 96368 THER/DIAG CONCURRENT INF: CPT

## 2020-03-15 PROCEDURE — 85610 PROTHROMBIN TIME: CPT

## 2020-03-15 PROCEDURE — 82271 OCCULT BLOOD OTHER SOURCES: CPT

## 2020-03-15 PROCEDURE — 83690 ASSAY OF LIPASE: CPT

## 2020-03-15 PROCEDURE — 84100 ASSAY OF PHOSPHORUS: CPT

## 2020-03-15 PROCEDURE — 74177 CT ABD & PELVIS W/CONTRAST: CPT

## 2020-03-15 PROCEDURE — 36415 COLL VENOUS BLD VENIPUNCTURE: CPT

## 2020-03-15 PROCEDURE — 86900 BLOOD TYPING SEROLOGIC ABO: CPT

## 2020-03-15 PROCEDURE — 80048 BASIC METABOLIC PNL TOTAL CA: CPT

## 2020-03-15 RX ADMIN — CEFAZOLIN SCH MLS/HR: 330 INJECTION, POWDER, FOR SOLUTION INTRAMUSCULAR; INTRAVENOUS at 21:39

## 2020-03-15 RX ADMIN — LACOSAMIDE SCH MLS/HR: 10 INJECTION INTRAVENOUS at 21:49

## 2020-03-15 RX ADMIN — LEVETIRACETAM SCH MG: 100 SOLUTION ORAL at 23:50

## 2020-03-15 RX ADMIN — PANTOPRAZOLE SODIUM SCH MG: 40 INJECTION, POWDER, FOR SOLUTION INTRAVENOUS at 21:38

## 2020-03-15 RX ADMIN — METRONIDAZOLE SCH MLS/HR: 500 INJECTION, SOLUTION INTRAVENOUS at 23:52

## 2020-03-15 RX ADMIN — IPRATROPIUM BROMIDE AND ALBUTEROL SULFATE SCH ML: .5; 3 SOLUTION RESPIRATORY (INHALATION) at 20:59

## 2020-03-15 NOTE — CT
EXAMINATION TYPE: CT abdomen pelvis w con

 

DATE OF EXAM: 3/15/2020

 

COMPARISON: Pain

 

HISTORY: GI bleed, possible obstruction

 

CT DLP: 1422.3 mGycm

Automated exposure control for dose reduction was used.

 

CONTRAST: 

CT scan of the abdomen pelvis is performed with IV Contrast, patient injected with 100 mL of Isovue 3
00.

 

FINDINGS- 

LUNG BASES-subsegmental changes involving the lung bases suggestive of pneumonia. There is a large hi
atal hernia with thickening of the distal esophagus.. 

 

LIVER/GB-   No gross abnormality is appreciated.

 

PANCREAS-  No gross abnormality is seen. 

SPLEEN-  No gross abnormality is seen. 

 

ADRENALS-  No gross abnormality is seen.

 

KIDNEYS/BLADDER-there is prominence of the renal collecting systems bilaterally. This was also noted 
by recent ultrasound. A punctate 2 mm upper pole right renal calculus noted..

   

BOWEL-PEG tube is seen and there appears to be a dilated bowel loops. Extends to the level of the rec
naye. Small amount of free fluid in the pelvis. Correlate for colonic ileus or fecal impaction.. 

  

LYMPH NODES-  No greater than 1cm abdominal or pelvic lymph nodes areappreciated. 

 

OSSEOUS STRUCTURES-  No significant abnormality is seen. 

 

OTHER-  aorta of normal caliber. 

 

IMPRESSION- 

1. Findings demonstrate markedly dilated bowel loops predominantly colonic extending to the level of 
the rectum. Correlate for colonic ileus. Retained fecal debris throughout the colon.

2. Lower lobe bilateral infiltrate.

3. Hiatal hernia with thickened wall the distal esophagus correlate for esophagitis.

4. PEG tube.

## 2020-03-15 NOTE — ED
GI Bleed HPI





- General


Chief complaint: GI Bleed


Stated complaint: Possible GI Bleed


Time Seen by Provider: 03/15/20 11:57


Source: patient, RN notes reviewed, old records reviewed


Mode of arrival: wheelchair


Limitations: no limitations





- History of Present Illness


Initial comments: 





This Patient is a 30-year-old male with history of developmental delay, autism. 

He has a tracheostomy and PEG tube.  He presents today with his aunt, guardian 

and caregiver.  Patient presents today with vomiting black coffee-ground emesis,

and black substance coming from the PEG tube for the past 2 days.  Patient's 

caregiver also reports that she is concerned for obstruction as she we'll give 

the Patient medications and then Patient will have severe vomiting afterwards.  

She denies any cough or fevers.  Patient last bowel movements were consistent 

with diarrhea almost if it's passing over an obstruction.  Patient has had  

previous GI bleeds.  Patient had his PEG tube placed at [facility], but

has seen Dr. Shaw in the past.  Patient's caregiver reports that he has been 

urinating slightly less. 





- Related Data


                                Home Medications











 Medication  Instructions  Recorded  Confirmed


 


Clobazam [Onfi] 20 mg PEG/G-TUBE BID@0000,1200 01/30/16 03/15/20


 


Lacosamide [Vimpat] 200 mg PEG/G-TUBE 01/30/16 03/15/20





 TID@0000,0930,1700  


 


Ascorbic Acid [Vitamin C] 500 mg PEG/G-TUBE BID@0930,1700 06/03/19 03/15/20


 


Folic Acid 1 mg PEG/G-TUBE DAILY@0930 06/03/19 03/15/20


 


Polyethylene Glycol 3350 [Miralax] 17 gm PEG/G-TUBE DAILY@0930 06/03/19 03/15/20


 


Vitamin B Complex 1 cap PEG/G-TUBE DAILY@0930 06/03/19 03/15/20


 


clonazePAM [KlonoPIN] 1 mg PEG/G-TUBE TID@0000,0930,1700 06/03/19 03/15/20


 


levETIRAcetam [Keppra Oral 1,800 mg PEG/G-TUBE 06/03/19 03/15/20





Solution] TID@0000,0930,1700  


 


Albuterol Nebulized [Ventolin 5 mg INHALATION Q12H 02/24/20 03/15/20





Nebulized]   


 


Cbd Oil 100 mg PEG/G-TUBE BID@0930,1200 02/24/20 03/15/20


 


Dexlansoprazole [Dexilant] 60 mg PEG/G-TUBE DAILY@0930 02/24/20 03/15/20


 


L.acidoph,Paracasei, B.lactis 1 cap PEG/G-TUBE Q72H 02/24/20 03/15/20





[Probiotic]   


 


Scopolamine [Scopolamine 1 MG/72 1 patch TRANSDERM Q72H 02/24/20 03/15/20





HR patch]   


 


Tamsulosin [Flomax] 0.4 mg PEG/G-TUBE DAILY@0930 02/24/20 03/15/20


 


Magnesium Citrate 5 oz PO ONCE PRN 03/15/20 03/15/20


 


Magnesium Hydroxide [Milk of 4,800 mg PO HS PRN 03/15/20 03/15/20





Magnesia]   


 


Na Phos,M-B/Na Phos,Di-Ba [Fleet 133 ml RECTAL ONCE PRN 03/15/20 03/15/20





Adult]   


 


Rufinamide [Banzel] 1,600 mg PO BID@0000,1200 03/15/20 03/15/20











                                    Allergies











Allergy/AdvReac Type Severity Reaction Status Date / Time


 


amoxicillin trihydrate Allergy  Unknown Verified 03/15/20 11:56





[From Augmentin]     


 


ceftriaxone sodium Allergy  Unknown Verified 03/15/20 11:56





[From Rocephin]     


 


cephalexin monohydrate Allergy  Unknown Verified 03/15/20 11:56





[From Keflex]     


 


Penicillins Allergy  Unknown Verified 03/15/20 11:56


 


phenobarbital Allergy  Unknown Verified 03/15/20 11:56


 


phenytoin sodium Allergy  Unknown Verified 03/15/20 11:56





[From Dilantin]     


 


phenytoin sodium extended Allergy  Unknown Verified 03/15/20 11:56





[From Dilantin]     


 


potassium clavulanate Allergy  Unknown Verified 03/15/20 11:56





[From Augmentin]     


 


Cephalosporins AdvReac  Unknown Verified 03/15/20 11:56


 


lorazepam [From Ativan] AdvReac  Unknown Verified 03/15/20 11:56














Review of Systems


ROS Statement: 


Those systems with pertinent positive or pertinent negative responses have been 

documented in the HPI.





ROS Other: All systems not noted in ROS Statement are negative.





Past Medical History


Past Medical History: Asthma, Respiratory Disorder, Seizure Disorder


Additional Past Medical History / Comment(s): developmental delay, autism


History of Any Multi-Drug Resistant Organisms: None Reported


Additional Past Surgical History / Comment(s): vagal nerve stimulator 06/2015, 

left ureter stent, embolist surgery, heel cord lengthening


Past Psychological History: ADD/ADHD


Smoking Status: Never smoker





General Exam





- General Exam Comments


Initial Comments: 





30-year-old male.  Nonverbal.


Limitations: no limitations


General appearance: alert, in no apparent distress


Head exam: Present: atraumatic, normocephalic, normal inspection


Eye exam: Present: normal appearance, PERRL, EOMI.  Absent: scleral icterus, 

conjunctival injection, periorbital swelling


ENT exam: Present: normal exam, normal oropharynx (Evidence of brown dried 

emesis over her lips.), mucous membranes dry, mucous membranes moist, other 

(Patient has evidence of trachea.)


Neck exam: Present: normal inspection.  Absent: tenderness, meningismus, 

lymphadenopathy


Respiratory exam: Present: normal lung sounds bilaterally.  Absent: respiratory 

distress, wheezes, rales, rhonchi, stridor


Cardiovascular Exam: Present: regular rate


GI/Abdominal exam: Present: soft, tenderness, normal bowel sounds, other (peg 

tube site appears well).  Absent: distended, guarding, rebound, rigid


Extremities exam: Present: normal inspection, full ROM, normal capillary refill.

 Absent: tenderness, pedal edema, joint swelling, calf tenderness


Back exam: Present: normal inspection


Neurological exam: Present: alert, oriented X3, CN II-XII intact


Psychiatric exam: Present: normal affect, normal mood


Skin exam: Present: warm, dry, intact, normal color.  Absent: rash





Course


                                   Vital Signs











  03/15/20 03/15/20 03/15/20





  11:46 13:02 13:22


 


Temperature 97.8 F  


 


Pulse Rate 68 89 92


 


Respiratory 18  





Rate   


 


Blood Pressure 154/94  


 


O2 Sat by Pulse 97  





Oximetry   














- Reevaluation(s)


Reevaluation #1: 





03/15/20 15:54


Patient's had episodes of projectile coffee-ground-appearing emesis.  Positive 

occult blood and gastric occult blood.





Medical Decision Making





- Medical Decision Making





30-year-old male with a history of small mental delay autism and epilepsy.  He 

is nonverbal with history of tracheostomy tube and PEG tube.  He presented today

with complaints of concern for bowel obstruction, with coffee-ground emesis and 

a small amount of diarrhea.  He's had history of bowel instructions the past.  

Patient caregiver is quite concerned that he has been vomiting up his 

antiseizure medications and has not been able to tolerate these medicines and is

worried that he may go into status epilepticus.  She received IV Keppra, and 2 

mg of diazepam at this time.  He has not had any seizure activity.  Patient's 

caregiver reports that he is not urinating quite as much since having these 

episodes of diarrhea and vomiting.


Patient had multiple episodes of coffee-ground emesis in emergency department.  

Patient was given IV fluids, laboratory obtained.  Evidence of leukocytosis.  

When he had emesis there is concern for some aspiration.  His chest x-ray shows 

evidence of bilateral pneumonia.  Patient will be started on Levaquin and 

clindamycin due to ALLERGIES.  At this time patient's computed tomography scan 

abdomen and pelvis was completed and shows evidence of dilated bowel loops and 

colonic extending to the rectum.  Patient received enema and is having large 

bowel movements.  At this time also received Barahona catheter due to some concern 

for urinary retention related to this colonic stasis.  Patient caregiver was 

informed of all plans.  Due to the coffee-ground emesis left consult to GI, 

hemoglobin is stable at this time.  He did receive IV Protonix..  It hopefully 

can resume PEG tube medications once his vomiting controlled after the colonic 

impaction is removed after his enemas.





- Lab Data


Result diagrams: 


                                 03/15/20 12:24





                                 03/15/20 12:24


                                   Lab Results











  03/15/20 03/15/20 03/15/20 Range/Units





  12:24 12:24 12:24 


 


WBC  16.4 H    (3.8-10.6)  k/uL


 


RBC  4.31    (4.30-5.90)  m/uL


 


Hgb  14.1    (13.0-17.5)  gm/dL


 


Hct  43.0    (39.0-53.0)  %


 


MCV  99.7    (80.0-100.0)  fL


 


MCH  32.7    (25.0-35.0)  pg


 


MCHC  32.8    (31.0-37.0)  g/dL


 


RDW  15.9 H    (11.5-15.5)  %


 


Plt Count  269    (150-450)  k/uL


 


Neutrophils %  92    %


 


Lymphocytes %  3    %


 


Monocytes %  4    %


 


Eosinophils %  1    %


 


Basophils %  0    %


 


Neutrophils #  15.0 H    (1.3-7.7)  k/uL


 


Lymphocytes #  0.5 L    (1.0-4.8)  k/uL


 


Monocytes #  0.7    (0-1.0)  k/uL


 


Eosinophils #  0.1    (0-0.7)  k/uL


 


Basophils #  0.0    (0-0.2)  k/uL


 


Macrocytosis  Slight    


 


PT   9.6   (9.0-12.0)  sec


 


INR   0.9   (<1.2)  


 


APTT   25.4   (22.0-30.0)  sec


 


Sodium    139  (137-145)  mmol/L


 


Potassium    3.2 L  (3.5-5.1)  mmol/L


 


Chloride    106  ()  mmol/L


 


Carbon Dioxide    23  (22-30)  mmol/L


 


Anion Gap    10  mmol/L


 


BUN    19  (9-20)  mg/dL


 


Creatinine    0.78  (0.66-1.25)  mg/dL


 


Est GFR (CKD-EPI)AfAm    >90  (>60 ml/min/1.73 sqM)  


 


Est GFR (CKD-EPI)NonAf    >90  (>60 ml/min/1.73 sqM)  


 


Glucose    121 H  (74-99)  mg/dL


 


Plasma Lactic Acid Lobo     (0.7-2.0)  mmol/L


 


Calcium    9.4  (8.4-10.2)  mg/dL


 


Magnesium    2.1  (1.6-2.3)  mg/dL


 


Total Bilirubin    0.5  (0.2-1.3)  mg/dL


 


AST    20  (17-59)  U/L


 


ALT    13  (4-49)  U/L


 


Alkaline Phosphatase    129 H  ()  U/L


 


Total Protein    6.8  (6.3-8.2)  g/dL


 


Albumin    4.3  (3.5-5.0)  g/dL


 


Lipase    104  ()  U/L


 


Gastric Occult Blood     (Negative)  


 


Stool Occult Blood     (Negative)  


 


Blood Type     


 


Blood Type Confirm     


 


Blood Type Recheck     


 


Bld Type Recheck Status     


 


Antibody Screen     


 


Spec Expiration Date     














  03/15/20 03/15/20 03/15/20 Range/Units





  12:24 12:24 12:24 


 


WBC     (3.8-10.6)  k/uL


 


RBC     (4.30-5.90)  m/uL


 


Hgb     (13.0-17.5)  gm/dL


 


Hct     (39.0-53.0)  %


 


MCV     (80.0-100.0)  fL


 


MCH     (25.0-35.0)  pg


 


MCHC     (31.0-37.0)  g/dL


 


RDW     (11.5-15.5)  %


 


Plt Count     (150-450)  k/uL


 


Neutrophils %     %


 


Lymphocytes %     %


 


Monocytes %     %


 


Eosinophils %     %


 


Basophils %     %


 


Neutrophils #     (1.3-7.7)  k/uL


 


Lymphocytes #     (1.0-4.8)  k/uL


 


Monocytes #     (0-1.0)  k/uL


 


Eosinophils #     (0-0.7)  k/uL


 


Basophils #     (0-0.2)  k/uL


 


Macrocytosis     


 


PT     (9.0-12.0)  sec


 


INR     (<1.2)  


 


APTT     (22.0-30.0)  sec


 


Sodium     (137-145)  mmol/L


 


Potassium     (3.5-5.1)  mmol/L


 


Chloride     ()  mmol/L


 


Carbon Dioxide     (22-30)  mmol/L


 


Anion Gap     mmol/L


 


BUN     (9-20)  mg/dL


 


Creatinine     (0.66-1.25)  mg/dL


 


Est GFR (CKD-EPI)AfAm     (>60 ml/min/1.73 sqM)  


 


Est GFR (CKD-EPI)NonAf     (>60 ml/min/1.73 sqM)  


 


Glucose     (74-99)  mg/dL


 


Plasma Lactic Acid Lobo  0.9    (0.7-2.0)  mmol/L


 


Calcium     (8.4-10.2)  mg/dL


 


Magnesium     (1.6-2.3)  mg/dL


 


Total Bilirubin     (0.2-1.3)  mg/dL


 


AST     (17-59)  U/L


 


ALT     (4-49)  U/L


 


Alkaline Phosphatase     ()  U/L


 


Total Protein     (6.3-8.2)  g/dL


 


Albumin     (3.5-5.0)  g/dL


 


Lipase     ()  U/L


 


Gastric Occult Blood     (Negative)  


 


Stool Occult Blood   Positive   (Negative)  


 


Blood Type    O Positive  


 


Blood Type Confirm     


 


Blood Type Recheck    No Previous Record  


 


Bld Type Recheck Status    CABO Indicated  


 


Antibody Screen    NEGATIVE  


 


Spec Expiration Date    03/18/2020 - 2324  














  03/15/20 03/15/20 Range/Units





  12:30 13:38 


 


WBC    (3.8-10.6)  k/uL


 


RBC    (4.30-5.90)  m/uL


 


Hgb    (13.0-17.5)  gm/dL


 


Hct    (39.0-53.0)  %


 


MCV    (80.0-100.0)  fL


 


MCH    (25.0-35.0)  pg


 


MCHC    (31.0-37.0)  g/dL


 


RDW    (11.5-15.5)  %


 


Plt Count    (150-450)  k/uL


 


Neutrophils %    %


 


Lymphocytes %    %


 


Monocytes %    %


 


Eosinophils %    %


 


Basophils %    %


 


Neutrophils #    (1.3-7.7)  k/uL


 


Lymphocytes #    (1.0-4.8)  k/uL


 


Monocytes #    (0-1.0)  k/uL


 


Eosinophils #    (0-0.7)  k/uL


 


Basophils #    (0-0.2)  k/uL


 


Macrocytosis    


 


PT    (9.0-12.0)  sec


 


INR    (<1.2)  


 


APTT    (22.0-30.0)  sec


 


Sodium    (137-145)  mmol/L


 


Potassium    (3.5-5.1)  mmol/L


 


Chloride    ()  mmol/L


 


Carbon Dioxide    (22-30)  mmol/L


 


Anion Gap    mmol/L


 


BUN    (9-20)  mg/dL


 


Creatinine    (0.66-1.25)  mg/dL


 


Est GFR (CKD-EPI)AfAm    (>60 ml/min/1.73 sqM)  


 


Est GFR (CKD-EPI)NonAf    (>60 ml/min/1.73 sqM)  


 


Glucose    (74-99)  mg/dL


 


Plasma Lactic Acid Lobo    (0.7-2.0)  mmol/L


 


Calcium    (8.4-10.2)  mg/dL


 


Magnesium    (1.6-2.3)  mg/dL


 


Total Bilirubin    (0.2-1.3)  mg/dL


 


AST    (17-59)  U/L


 


ALT    (4-49)  U/L


 


Alkaline Phosphatase    ()  U/L


 


Total Protein    (6.3-8.2)  g/dL


 


Albumin    (3.5-5.0)  g/dL


 


Lipase    ()  U/L


 


Gastric Occult Blood   Positive  (Negative)  


 


Stool Occult Blood    (Negative)  


 


Blood Type    


 


Blood Type Confirm  O Positive   


 


Blood Type Recheck    


 


Bld Type Recheck Status    


 


Antibody Screen    


 


Spec Expiration Date    














03/15/20 15:10


EKG shows normal sinus rhythm normal ECG.  Ventricular rate of 80 bpm.  VA 

intervals 150 ms.  QS duration is 80 ms.  QT QTc is 370/447 seconds.





- Radiology Data


Radiology results: report reviewed


CT shows findings demonstrated dilated bowel loops prominently colonic extending

the level of the rectum.  Correlating for colonic ileus.  Retained fecal debris 

throughout the colon.  Lower lobe infiltrate.  Hiatal hernia with thickened 

distal wall esophagus correlate for esophagitis.  Evidence of PEG tube.





X-ray shows bilateral lower lobe infiltrate.





Critical Care Time


Critical Care Time: Yes


Total Critical Care Time: 30





Disposition


Clinical Impression: 


 Bowel obstruction, Fecal impaction of colon, Aspiration pneumonia, GI bleed, 

Leukocytosis, Coffee ground emesis, Seizure disorder





Disposition: ADMITTED AS IP TO THIS HOSP


Condition: Stable


Is patient prescribed a controlled substance at d/c from ED?: No


Referrals: 


Micaela Bean MD [Primary Care Provider] - 1-2 days


Time of Disposition: 15:51

## 2020-03-15 NOTE — XR
EXAMINATION TYPE: XR chest 2V

 

DATE OF EXAM: 3/15/2020

 

COMPARISON: 1/30/2016

 

TECHNIQUE: PA and lateral views submitted.

 

HISTORY: Cough

 

FINDINGS:

Limited inspiration. Cardiac device and tracheostomy tube noted. Subsegmental changes involving the l
sharon bases. Hypertrophic and degenerative change of the spine. No overt failure.

 

IMPRESSION:

1. Bilateral lower lobe infiltrate.

## 2020-03-15 NOTE — HP
HISTORY AND PHYSICAL



CHIEF COMPLAINTS:

GI bleed.



HISTORY OF PRESENT ILLNESS:

This 30-year-old gentleman with a past medical history of asthma, history of

developmental delay, history of respiratory disease, seizure disorder, history 
of

autism, had history of  being followed by Dr. Bean in the outpatient setting, 
had

last night.  The patient's aunt is guardian and caregiver.  The patient today 
presented

with vomiting of black colored coffee material, black stools coming out of the 
PEG tube

for the last 2 days.  Patient apparently aspirated too.  The family is concerned
about

obstructions which the patient previously had on multiple occasions.  The 
patient also

had previous PEG tube insertion and infection and also being managed in [facility].  The patient has seen Dr. Smith also.  The patient currently is 
stuporous and

unable to give a coherent history.  Most of the history taken from my discussion
with

the ER physician, review of chart and discussion with the caregiver at the 
bedside.

There is no history of any trauma at this time.



The patient had history of constipation, also.  The patient was given with some

significant results in the ER.



PAST MEDICAL HISTORY:

History of asthma, seizure disorder, history of autism, PEG tube placement, 
ADHD.



MEDICATIONS:

Prior to admission include home medications are :

1. Vitamin B complex p.o. daily.

2. Banzel 60 mg p.o. b.i.d.

3. Fleet daily p.r.n.

4. Folic acid 1 mg daily.

5. Onfi 20 mg per PEG daily.

6. Magnesium citrate 5 mg p.r.n.

8. Flomax 0.4.

9. Scopolamine 1 patch q.72h hours.

10.Probiotic.

11.Ventolin 2.5 mg b.i.d.

12.MiraLAX 17 g daily.

13.CBD oil 100 mg b.i.d.

14.Dexilant 60 mg per PEG daily.

15.Vitamin C 500 mg b.i.d.

16.Keppra 800 mg p.o. t.i.d.

17.Vimpat 200 mg p.o. t.i.d.

18.Klonopin 1 mg p.o. t.i.d.



ALLERGIES:

AMOXICILLIN, ROCEPHIN,  PENICILLIN, PHENOBARBITAL, DILANTIN, AUGMENTIN, 
CEPHALOSPORIN,

ATIVAN.



Family history,  social history and review of systems could not be taken because
of

change in mental status.  No history of any smoking per chart.



PHYSICAL EXAM:

Patient is stuporous.  Pulse is at 98.  Blood pressure 140/80, respiration 18,

temperature 98.2, pulse ox 98% on room air.

HEENT is conjunctivae normal.

NECK: No JVD.

CARDIOVASCULAR: S1, S2 muffled.

RESPIRATORY: Breath sounds diminished in the bases.  A few scattered rhonchi and

crackles.

ABDOMEN:  Soft.  PEG tube in situ.

No guarding.  No rigidity.  No mass palpable.

LEGS no edema.

NERVOUS SYSTEM:  Diffuse contractures and diffuse weakness also.

LYMPHATICS: No lymph nodes palpable in the neck or axilla or groin.

JOINTS: No active deforming arthropathy.  Nervous system otherwise mentioned 
earlier.

The patient is stuporous. Does not cooperate with the exam.  Does not move the 
legs.

Skin:  No ulcers. No rashes and no bleeding.

JOINTS no active deforming arthropathy.



LABS:

WBC 16.4, hemoglobin 14.6.  INR is 0.9 sodium 130 potassium 3.2.  UA shows 
possibly

UTI.  Stool OB is positive.  Gastric OB is also positive.  The chest x-ray shows

bilateral lower lobe infiltrates, possible aspiration.  Abdomen and pelvis CAT 
scan

showed markedly dilated bowel loops upper abdomen predominantly colonic 
extending to

the level of the rectum possibly and lower lobe infiltrate and hiatal hernia, 
PEG tube

placement.



ASSESSMENT:

1. Upper gastrointestinal bleeding with possibly peptic ulcer disease.

2. Possible bilateral aspiration pneumonia.

3. Possible bowel obstruction and colonic obstruction secondary to possible

    constipation.

4. Hypokalemia.

5. Rule out possible urinary tract infection.

6. History of asthma.

7. History of seizure disorder.

8. History of development delay.

9. History of autism.

10.History of seizure disorder partial complex.

11.History of intraabdominal wall abscess.

12.History of vagal nerve stimulator.

13.PEG tube placement.

14.Left ureteral stent.

15.History of attention-deficit disorder,  attention-deficit/hyperactivity 
disorder.

16.FULL CODE.



RECOMMENDATION AND DISCUSSION:

This 30-year-old gentleman presented with multiple complex medical issues.

Continue current medications at this time.  We will initiate broad-spectrum IV

antibiotics.  I would also recommend Gastroenterology and surgical consultation 
also.

Continue the current medications.  Monitor hemoglobin closely.  The overall 
prognosis

guarded because of multiple complex medical issues.  Further recommendations to 
follow.

A copy being forwarded to Dr. Bean who is the primary physician.  We will also

administer bronchodilators and keep the patient NPO for now and continue to 
monitor,

n.p.o. except medications for now and continue to monitor.  Prognosis guarded.  
Further

recommendations to follow.





MMODL / IJN: [id number] / Job#: [id number]

MTDD

## 2020-03-16 LAB
ANION GAP SERPL CALC-SCNC: 8 MMOL/L
BASOPHILS # BLD AUTO: 0 K/UL (ref 0–0.2)
BASOPHILS NFR BLD AUTO: 0 %
BUN SERPL-SCNC: 13 MG/DL (ref 9–20)
CALCIUM SPEC-MCNC: 8.7 MG/DL (ref 8.4–10.2)
CHLORIDE SERPL-SCNC: 110 MMOL/L (ref 98–107)
CO2 SERPL-SCNC: 25 MMOL/L (ref 22–30)
EOSINOPHIL # BLD AUTO: 0.2 K/UL (ref 0–0.7)
EOSINOPHIL NFR BLD AUTO: 2 %
ERYTHROCYTE [DISTWIDTH] IN BLOOD BY AUTOMATED COUNT: 3.56 M/UL (ref 4.3–5.9)
ERYTHROCYTE [DISTWIDTH] IN BLOOD: 16 % (ref 11.5–15.5)
GLUCOSE SERPL-MCNC: 90 MG/DL (ref 74–99)
HCT VFR BLD AUTO: 35.4 % (ref 39–53)
HGB BLD-MCNC: 11.6 GM/DL (ref 13–17.5)
LYMPHOCYTES # SPEC AUTO: 1 K/UL (ref 1–4.8)
LYMPHOCYTES NFR SPEC AUTO: 14 %
MCH RBC QN AUTO: 32.6 PG (ref 25–35)
MCHC RBC AUTO-ENTMCNC: 32.8 G/DL (ref 31–37)
MCV RBC AUTO: 99.5 FL (ref 80–100)
MONOCYTES # BLD AUTO: 0.4 K/UL (ref 0–1)
MONOCYTES NFR BLD AUTO: 5 %
NEUTROPHILS # BLD AUTO: 5.8 K/UL (ref 1.3–7.7)
NEUTROPHILS NFR BLD AUTO: 77 %
PLATELET # BLD AUTO: 229 K/UL (ref 150–450)
POTASSIUM SERPL-SCNC: 3.3 MMOL/L (ref 3.5–5.1)
SODIUM SERPL-SCNC: 143 MMOL/L (ref 137–145)
WBC # BLD AUTO: 7.5 K/UL (ref 3.8–10.6)

## 2020-03-16 RX ADMIN — LEVETIRACETAM SCH MG: 100 SOLUTION ORAL at 18:00

## 2020-03-16 RX ADMIN — LACOSAMIDE SCH MLS/HR: 10 INJECTION INTRAVENOUS at 09:19

## 2020-03-16 RX ADMIN — LEVOFLOXACIN SCH MLS/HR: 750 INJECTION, SOLUTION INTRAVENOUS at 17:01

## 2020-03-16 RX ADMIN — LACTOBACILLUS ACIDOPHILUS / LACTOBACILLUS BULGARICUS SCH EACH: 100 MILLION CFU STRENGTH GRANULES at 08:49

## 2020-03-16 RX ADMIN — ASPIRIN SCH MG: 325 TABLET ORAL at 03:01

## 2020-03-16 RX ADMIN — TAMSULOSIN HYDROCHLORIDE SCH MG: 0.4 CAPSULE ORAL at 08:49

## 2020-03-16 RX ADMIN — PANTOPRAZOLE SODIUM SCH MG: 40 INJECTION, POWDER, FOR SOLUTION INTRAVENOUS at 22:21

## 2020-03-16 RX ADMIN — IPRATROPIUM BROMIDE AND ALBUTEROL SULFATE SCH ML: .5; 3 SOLUTION RESPIRATORY (INHALATION) at 11:43

## 2020-03-16 RX ADMIN — IPRATROPIUM BROMIDE AND ALBUTEROL SULFATE SCH ML: .5; 3 SOLUTION RESPIRATORY (INHALATION) at 07:55

## 2020-03-16 RX ADMIN — FOLIC ACID SCH MG: 1 TABLET ORAL at 08:49

## 2020-03-16 RX ADMIN — HEPARIN SODIUM SCH UNIT: 5000 INJECTION, SOLUTION INTRAVENOUS; SUBCUTANEOUS at 22:21

## 2020-03-16 RX ADMIN — ASPIRIN SCH MG: 325 TABLET ORAL at 12:09

## 2020-03-16 RX ADMIN — CEFAZOLIN SCH MLS/HR: 330 INJECTION, POWDER, FOR SOLUTION INTRAMUSCULAR; INTRAVENOUS at 08:50

## 2020-03-16 RX ADMIN — IPRATROPIUM BROMIDE AND ALBUTEROL SULFATE SCH ML: .5; 3 SOLUTION RESPIRATORY (INHALATION) at 20:08

## 2020-03-16 RX ADMIN — METRONIDAZOLE SCH MLS/HR: 500 INJECTION, SOLUTION INTRAVENOUS at 15:42

## 2020-03-16 RX ADMIN — LACOSAMIDE SCH MLS/HR: 10 INJECTION INTRAVENOUS at 22:20

## 2020-03-16 RX ADMIN — LEVETIRACETAM SCH MG: 100 SOLUTION ORAL at 08:49

## 2020-03-16 RX ADMIN — METRONIDAZOLE SCH MLS/HR: 500 INJECTION, SOLUTION INTRAVENOUS at 08:28

## 2020-03-16 RX ADMIN — PANTOPRAZOLE SODIUM SCH MG: 40 INJECTION, POWDER, FOR SOLUTION INTRAVENOUS at 08:28

## 2020-03-16 NOTE — CONS
CONSULTATION



DATE OF DICTATION:

03/16/2020



REASON FOR CONSULTATION:

Coffee-ground emesis.



HISTORY OF PRESENT ILLNESS:

The patient is a 30-year-old white male with history of developmental delay, seizure

disorder and autism who was brought to the emergency room by his aunt, where he

presented with abdominal distention, worsening constipation and multiple episodes of

coffee-ground emesis.  The patient has a history of chronic constipation and has been

in the hospital recently for the same. He was started on MiraLAX through the PEG tube

and he was doing reasonably well.  However, for the last one week his schedule has been

changed and he started having worsening constipation.  The patient was having abdominal

distention with abdominal discomfort associated with multiple episodes of nausea or

vomiting that happened 2 days ago.  He had about 3 or 4 episodes of coffee-ground

emesis and he was taken to the emergency room and subsequently admitted to the hospital

for further evaluation. His initial abdominal x-rays did show evidence of fecal stasis.

The patient was given 2 enemas in the ER and he had 2 large bowel movements.



PAST MEDICAL HISTORY:

Significant for developmental delay, seizure disorder, asthma, autism, ADHD.



PAST SURGICAL HISTORY:

PEG tube placement.



MEDICATIONS:

Medications at home include vitamin B, Fleet enemas as needed, folic acid, magnesium

citrate, Flomax, scopolamine, probiotic, Ventolin, MiraLAX, CBD oil, Dexilant, Vitamin

C, Keppra, Vimpat and Klonopin.



ALLERGIES:

AMOXICILLIN, ROCEPHIN, PENICILLIN, PHENOBARB, DILANTIN, AUGMENTIN, CEPHALOSPORIN AND

ATIVAN.



FAMILY HISTORY:

Unremarkable.



REVIEW OF SYSTEMS:

Review of systems could not be obtained because patient could not provide this.



PHYSICAL EXAMINATION:

He appears comfortable. No apparent distress.

Vital signs are stable. Blood pressure is 140/82, pulse rate 86 per minute and

afebrile.

HEENT examination unremarkable. Conjunctivae pink. Sclerae anicteric. Oral cavity no

lesions.

NECK: No JVD or lymph node enlargement.

CHEST: Clear to auscultation.

HEART:  Regular rate and rhythm.

ABDOMEN:  Soft.  PEG tube in place. It was non-tender, non-distended. Bowel sounds are

positive.  No organomegaly.

EXTREMITIES: No pedal edema.

NEUROLOGIC:  He is awake. Some extremity contractures noted.



LABS/IMAGING:

Labs done at the time of admission to the hospital showed WBC 16.4, hemoglobin 14.1,

platelets normal.  Today hemoglobin is down to 11.6, WBC 7.5, platelets normal.  Basic

metabolic panel is within normal limits.  PT/INR is within normal limits.  BUN is 19,

creatinine 0.78.



CT of the abdomen and pelvis done in the emergency room yesterday did show markedly

dilated bowel loops predominantly involving the colon extending all the way to the

level of rectum with retained fecal debris throughout the colon.  There was a hiatal

hernia with thickening of the distal wall of the esophagus and left lower lobe

bilateral infiltrate noted.  Abdominal x-rays from this morning: Large amount of fecal

debris in the sigmoid and rectum consistent with fecal stasis.



IMPRESSION:

1. Chronic constipation with CT scan of the abdomen showing dilated colon with

    retained stool, especially in the rectum and in the sigmoid colon consistent with

    severe fecal stasis.  Patient presently receiving enema and had 2 large bowel

    movements. Will start him back on MiraLAX via the PEG tube 17 grams twice daily.

2. Upper gastrointestinal bleed with several episodes of coffee-ground emesis.

    Hemoglobin dropped from 14 to 11.5 g/dL. Last EGD done at the time of PEG tube

    placement was about a year and a half ago.

3. History of developmental delay and autism.

4. History of seizure disorder.

5. Possible urinary tract infection, on broad-spectrum antibiotics.



RECOMMENDATIONS:

1. Monitor hemoglobin daily.

2. Continue Protonix 40 mg daily.

3. Will continue Fleet enemas as needed and start him on MiraLAX via the PEG tube.

    Will do 17.5 grams twice daily and adjust based on results.

4. Continue with antibiotics.

5. Repeat CBC in the morning.

6. No plans for any endoscopic intervention at the present time.

7. Will follow with you closely.

Thank you for this consultation.





MMODL / IJN: [id number] / Job#: [id number]

## 2020-03-16 NOTE — P.GSCN
History of Present Illness


Consult date: 03/16/20


Reason for Consult: 





bowel obstruction





Requesting physician: Mandeep Prater


History of present illness: 





CHIEF COMPLAINT: bowel obstruction





HISTORY OF PRESENT ILLNESS: 30-year-old male with history of developmental delay

and autism who has a tracheostomy and PEG tube who presented to the emergency 

room with his family and caregiver secondary to emesis.  Apparently patient's fa

jorden has been giving him his medications and patient has been vomiting 

afterwards. General surgery was consulted for further evaluation. 





PAST MEDICAL HISTORY: 


See list.





PAST SURGICAL HISTORY: 


See list.





SOCIAL HISTORY: No illicit drug use.  





REVIEW OF SYSTEMS: 


Unable to obtain secondary to cognitive impairment





PHYSICAL EXAM: 


VITAL SIGNS: Reviewed.


GENERAL: Well-developed in no acute distress. 


HEENT:  Trach noted. No sclera icterus. Extraocular movements grossly intact.  

Moist buccal mucosa. Head is atraumatic, normocephalic. 


ABDOMEN:  Soft.  Distended. Nontender. PEG tube noted.


NEUROLOGIC: Nonverbal at baseline





LABORATORY DATA:


WBC 16.4 on admission.  Repeat 7.5.





IMAGING:


CT abdomen and pelvis: Findings demonstrate markedly dilated bowel loops 

predominantly colonic exciting to follow up the rectum.  Correlate for colonic 

ileus.  Retained fecal debris throughout the colon.  Hiatal hernia with 

thickened wall of the distal esophagus.  Correlate for esophagitis.  PEG tube 

noted.





ASSESSMENT: 


1.  Vomiting


2.  Megacolon, colonic ileus


3.  History of trach and PEG





PLAN: 


-Soap suds enemas until clear


-No oral cathartics to be given via PEG tube


-Place PEG tube to dependent drainage





Nurse practitioner note has been reviewed by physician. Signing provider agrees 

with the documented findings, assessment, and plan of care. 








Past Medical History


Past Medical History: Asthma, Respiratory Disorder, Seizure Disorder


Additional Past Medical History / Comment(s): developmental delay, autism, last 

sz last night partial complex, trach, intra abdominal wall abcess


History of Any Multi-Drug Resistant Organisms: None Reported


Additional Past Surgical History / Comment(s): vagal nerve stimulator 06/2015, 

left ureter stent, embolist surgery, heel cord lengthening, trans urethral 

prostate incision


Past Anesthesia/Blood Transfusion Reactions: No Reported Reaction


Past Psychological History: ADD/ADHD


Smoking Status: Never smoker


Past Alcohol Use History: None Reported


Past Drug Use History: None Reported





Medications and Allergies


                                Home Medications











 Medication  Instructions  Recorded  Confirmed  Type


 


Clobazam [Onfi] 20 mg PEG/G-TUBE BID@0000,1200 01/30/16 03/15/20 History


 


Lacosamide [Vimpat] 200 mg PEG/G-TUBE 01/30/16 03/15/20 History





 TID@0000,0930,1700   


 


Ascorbic Acid [Vitamin C] 500 mg PEG/G-TUBE BID@0930,1700 06/03/19 03/15/20 

History


 


Folic Acid 1 mg PEG/G-TUBE DAILY@0930 06/03/19 03/15/20 History


 


Polyethylene Glycol 3350 [Miralax] 17 gm PEG/G-TUBE DAILY@0930 06/03/19 03/15/20

 History


 


Vitamin B Complex 1 cap PEG/G-TUBE DAILY@0930 06/03/19 03/15/20 History


 


clonazePAM [KlonoPIN] 1 mg PEG/G-TUBE TID@0000,0930,1700 06/03/19 03/15/20 

History


 


levETIRAcetam [Keppra Oral 1,800 mg PEG/G-TUBE 06/03/19 03/15/20 History





Solution] TID@0000,0930,1700   


 


Albuterol Nebulized [Ventolin 5 mg INHALATION Q12H 02/24/20 03/15/20 History





Nebulized]    


 


Cbd Oil 100 mg PEG/G-TUBE BID@0930,1200 02/24/20 03/15/20 History


 


Dexlansoprazole [Dexilant] 60 mg PEG/G-TUBE DAILY@0930 02/24/20 03/15/20 History


 


L.acidoph,Paracasei, B.lactis 1 cap PEG/G-TUBE Q72H 02/24/20 03/15/20 History





[Probiotic]    


 


Scopolamine [Scopolamine 1 MG/72 1 patch TRANSDERM Q72H 02/24/20 03/15/20 

History





HR patch]    


 


Tamsulosin [Flomax] 0.4 mg PEG/G-TUBE DAILY@0930 02/24/20 03/15/20 History


 


Magnesium Citrate 5 oz PO ONCE PRN 03/15/20 03/15/20 History


 


Magnesium Hydroxide [Milk of 4,800 mg PO HS PRN 03/15/20 03/15/20 History





Magnesia]    


 


Na Phos,M-B/Na Phos,Di-Ba [Fleet 133 ml RECTAL ONCE PRN 03/15/20 03/15/20 

History





Adult]    


 


Rufinamide [Banzel] 1,600 mg PO BID@0000,1200 03/15/20 03/15/20 History








                                    Allergies











Allergy/AdvReac Type Severity Reaction Status Date / Time


 


amoxicillin trihydrate Allergy  Unknown Verified 03/15/20 11:56





[From Augmentin]     


 


ceftriaxone sodium Allergy  Unknown Verified 03/15/20 11:56





[From Rocephin]     


 


cephalexin monohydrate Allergy  Unknown Verified 03/15/20 11:56





[From Keflex]     


 


Penicillins Allergy  Unknown Verified 03/15/20 11:56


 


phenobarbital Allergy  Unknown Verified 03/15/20 11:56


 


phenytoin sodium Allergy  Unknown Verified 03/15/20 11:56





[From Dilantin]     


 


phenytoin sodium extended Allergy  Unknown Verified 03/15/20 11:56





[From Dilantin]     


 


potassium clavulanate Allergy  Unknown Verified 03/15/20 11:56





[From Augmentin]     


 


Cephalosporins AdvReac  Unknown Verified 03/15/20 11:56


 


lorazepam [From Ativan] AdvReac  Unknown Verified 03/15/20 11:56














Surgical - Exam


                                   Vital Signs











Temp Pulse Resp BP Pulse Ox


 


 97.8 F   68   18   154/94   97 


 


 03/15/20 11:46  03/15/20 11:46  03/15/20 11:46  03/15/20 11:46  03/15/20 11:46














Results





- Labs





                                 03/16/20 07:42





                                 03/16/20 07:42


                  Abnormal Lab Results - Last 24 Hours (Table)











  03/15/20 03/16/20 03/16/20 Range/Units





  16:39 07:42 07:42 


 


RBC   3.56 L   (4.30-5.90)  m/uL


 


Hgb   11.6 L   (13.0-17.5)  gm/dL


 


Hct   35.4 L   (39.0-53.0)  %


 


RDW   16.0 H   (11.5-15.5)  %


 


Potassium    3.3 L  (3.5-5.1)  mmol/L


 


Chloride    110 H  ()  mmol/L


 


Ur Specific Gravity  1.049 H    (1.001-1.035)  


 


Urine Protein  1+ H    (Negative)  


 


Urine Ketones  1+ H    (Negative)  


 


Urine Blood  Small H    (Negative)  


 


Ur Leukocyte Esterase  Large H    (Negative)  


 


Urine RBC  57 H    (0-5)  /hpf


 


Urine WBC  >182 H    (0-5)  /hpf


 


Urine Bacteria  Rare H    (None)  /hpf


 


Urine Mucus  Few H    (None)  /hpf








                      Microbiology - Last 24 Hours (Table)











 03/15/20 22:00 Urine Culture - Preliminary





 Urine,Catheterized 








                                 Diabetes panel











  03/16/20 Range/Units





  07:42 


 


Sodium  143  (137-145)  mmol/L


 


Potassium  3.3 L  (3.5-5.1)  mmol/L


 


Chloride  110 H  ()  mmol/L


 


Carbon Dioxide  25  (22-30)  mmol/L


 


BUN  13  (9-20)  mg/dL


 


Creatinine  0.74  (0.66-1.25)  mg/dL


 


Glucose  90  (74-99)  mg/dL


 


Calcium  8.7  (8.4-10.2)  mg/dL








                                  Calcium panel











  03/16/20 Range/Units





  07:42 


 


Calcium  8.7  (8.4-10.2)  mg/dL








                                 Pituitary panel











  03/16/20 Range/Units





  07:42 


 


Sodium  143  (137-145)  mmol/L


 


Potassium  3.3 L  (3.5-5.1)  mmol/L


 


Chloride  110 H  ()  mmol/L


 


Carbon Dioxide  25  (22-30)  mmol/L


 


BUN  13  (9-20)  mg/dL


 


Creatinine  0.74  (0.66-1.25)  mg/dL


 


Glucose  90  (74-99)  mg/dL


 


Calcium  8.7  (8.4-10.2)  mg/dL








                                  Adrenal panel











  03/16/20 Range/Units





  07:42 


 


Sodium  143  (137-145)  mmol/L


 


Potassium  3.3 L  (3.5-5.1)  mmol/L


 


Chloride  110 H  ()  mmol/L


 


Carbon Dioxide  25  (22-30)  mmol/L


 


BUN  13  (9-20)  mg/dL


 


Creatinine  0.74  (0.66-1.25)  mg/dL


 


Glucose  90  (74-99)  mg/dL


 


Calcium  8.7  (8.4-10.2)  mg/dL

## 2020-03-16 NOTE — PN
PROGRESS NOTE



DATE OF SERVICE:

03/16/2020



This 30-year-old gentleman who was admitted with acute upper GI bleeding with 
possibly

peptic ulcer also had bilateral aspiration pneumonia.  The patient also had

tracheostomy and PEG tube. The patient also had possibly suspected lower 
abdominal

obstruction with possible chronic obstruction.  The patient also had a CT scan 
of the

abdomen and pelvis. Multiple consultants are following the patient closely. 
Plain x-ray

of the abdomen flat plate, which was ordered by me and reviewed personally by me
today

showed significant dilated bowel loops.  Patient is being closely monitored. 
Past

medical history reviewed. Review of systems could not be taken; the patient is 
rather

drowsy.



CURRENT MEDICATIONS:

Reviewed. They include:

1. DuoNeb q.i.d. and p.r.n.

2. Klonopin 1 mg b.i.d.

3. Folic acid.

4. Dilaudid.

5. Lacosamide.

6. Lactobacillus.

7. Keppra.

8. Levaquin 750 mg.

9. Flagyl 500 mg IV q.8.

10.Narcan.

11.Onfi.

12.Banzel.

13.Zofran.

14.Protonix.

15.Flomax.



PHYSICAL EXAMINATION:

The pulse is 82, blood pressure 90/57, respiration 18, temperature 97.9, pulse 
ox 94%

on room air.

HEENT: Conjunctivae normal.

NECK: No jugular venous distention. Status post tracheostomy.

CARDIOVASCULAR SYSTEM:  S1, S2 muffled.

RESPIRATORY SYSTEM: Breath sounds diminished at the bases.  A few scattered 
rhonchi and

crackles.

ABDOMEN: Soft. Mild diffuse distention. No mass palpable. PEG tube 

LEGS: No edema. No swelling.

NERVOUS SYSTEM: No focal deficit.



LABS:

WBC 7.5, hemoglobin 11.6, sodium 143, potassium 3.3. UA noted; possible UTI.



ASSESSMENT:

1. Acute upper gastrointestinal bleeding with possible peptic ulcer disease.

2. Possible bibasilar aspiration pneumonia.

3. Possible bowel obstruction with colonic distention, possibly secondary to

    constipation.

4. Possible acute urinary tract infection, present on admission.

5. Hypokalemia.

6. History of asthma.

7. History of seizure disorder.

8. History of developmental delay.

9. History of autism.

10.History of seizure disorder, partial complex.

11.History intraabdominal wall abscess.

12.History of vagal nerve stimulator.

13.PEG tube placement.

14.Left ureteral stent.

15.History of attention deficit disorder, attention deficit hyperactivity 
disorder.

16.FULL CODE.



RECOMMENDATIONS AND DISCUSSION:

I recommend to continue current medications, continue with the monitoring, 
symptomatic

treatment. Continue with broad-spectrum IV antibiotics. I would also recommend 
surgical

evaluation.  Patient is n.p.o. Would also recommend infectious disease and 
pulmonary

evaluations. Guarded prognosis because of multiple complex medical issues.  
Further

recommendations to follow.  Cut down on the IV fluids.  DVT prophylaxis.





MMODL / IJN: [id number] / Job#: [id number]

MTDD

## 2020-03-16 NOTE — PN
PROGRESS NOTE



DATE OF SERVICE:

03/16/2020



REASON FOR FOLLOWUP:

Possible aspiration pneumonia.



INTERVAL HISTORY:

The patient is currently afebrile.  Patient has been breathing comfortably.  No

significant cough has been reported.  No further vomiting and the patient did have a

bowel movement.  The patient himself unable to provide any history.



PHYSICAL EXAMINATION:

Blood pressure 92/50 with a pulse of 82, temperature 97.9.  He is 94% on room air.

General description is a young male lying in bed in no distress.  Respiratory system:

Unlabored breathing, decreased breath sounds in the base.  No wheeze.  Heart S1, S2.

Regular rate and rhythm.

ABDOMEN:  Soft.  No tenderness.



LABS:

Hemoglobin 11.4, white count normal at 7.5 creatinine 0.74.



DIAGNOSTIC IMPRESSION AND PLAN:

Patient admitted to the hospital with acute nausea, vomiting in this patient with

evidence of large bowel obstruction.  Chest x-ray also suspicious for pneumonia.

Patient does have MULTIPLE ANTIBIOTIC ALLERGIES. Currently covered on Levaquin and

Flagyl to continue and monitor his clinical course closely.





MMODL / IJN: [id number] / Job#: [id number]

## 2020-03-16 NOTE — P.CONS
History of Present Illness





- Reason for Consult


Consult date: 03/15/20


aspiration pneumonia


Requesting physician: Mandeep Prater





- Chief Complaint


vomiting x 2 days





- History of Present Illness


Patient is a 30-year-old male with developmental delay autism and this patient 

currently do have a tracheostomy and a PEG tube and has been under the care of 

his aunt who is his guardian, the patient has been brought to the [facility] ER today with the vomiting black coffee-ground emesis and the black's 

substance coming from his PEG tube for the last 2 days aunt who provided most of

the history did mention what ever had to give him to his feeding tube comes out 

through his mouth within 2 hours is not very clear the patient has been complai

gretta of any abdominal pain and had no diarrhea or bleeding per rectum no 

significant cough has been noticed by the aunt with the same to the patient was 

brought into the ER on arrival to the ER the patient has been afebrile patient 

did have white count of 16.4 patient did have a positive UA he also have a chest

x-ray shows bilateral lower lobe infiltrate CT of abdominal pelvis was completed

which shows markedly dilated bowel loops predominantly colonic extending to the 

level of the rectum correlate for ileus lower lobe infiltrate hiatal hernia with

thickened wall distal esophagus correlate for esophagitis patient has been 

started on Levaquin because of his multiple antibiotic allergies infectious 

disease was consulted for further recommendation regarding antibiotic and 

concern for possible aspiration pneumonia most information has been obtained 

from review the chart and talking to the aunt as the patient himself is unable 

to write and reliable history.








Review of Systems


Positive points mentioned in history of present illness complete review could 

not be obtained because of his underlying medical condition








Past Medical History


Past Medical History: Asthma, Respiratory Disorder, Seizure Disorder


Additional Past Medical History / Comment(s): developmental delay, autism, last 

sz last night partial complex, trach, intra abdominal wall abcess


History of Any Multi-Drug Resistant Organisms: None Reported


Additional Past Surgical History / Comment(s): vagal nerve stimulator 06/2015, 

left ureter stent, embolist surgery, heel cord lengthening, trans urethral 

prostate incision


Past Anesthesia/Blood Transfusion Reactions: No Reported Reaction


Past Psychological History: ADD/ADHD


Smoking Status: Never smoker


Past Alcohol Use History: None Reported


Past Drug Use History: None Reported





Medications and Allergies


                                Home Medications











 Medication  Instructions  Recorded  Confirmed  Type


 


Clobazam [Onfi] 20 mg PEG/G-TUBE BID@0000,1200 01/30/16 03/15/20 History


 


Lacosamide [Vimpat] 200 mg PEG/G-TUBE 01/30/16 03/15/20 History





 TID@0000,0930,1700   


 


Ascorbic Acid [Vitamin C] 500 mg PEG/G-TUBE BID@0930,1700 06/03/19 03/15/20 

History


 


Folic Acid 1 mg PEG/G-TUBE DAILY@0930 06/03/19 03/15/20 History


 


Polyethylene Glycol 3350 [Miralax] 17 gm PEG/G-TUBE DAILY@0930 06/03/19 03/15/20

 History


 


Vitamin B Complex 1 cap PEG/G-TUBE DAILY@0930 06/03/19 03/15/20 History


 


clonazePAM [KlonoPIN] 1 mg PEG/G-TUBE TID@0000,0930,1700 06/03/19 03/15/20 

History


 


levETIRAcetam [Keppra Oral 1,800 mg PEG/G-TUBE 06/03/19 03/15/20 History





Solution] TID@0000,0930,1700   


 


Albuterol Nebulized [Ventolin 5 mg INHALATION Q12H 02/24/20 03/15/20 History





Nebulized]    


 


Cbd Oil 100 mg PEG/G-TUBE BID@0930,1200 02/24/20 03/15/20 History


 


Dexlansoprazole [Dexilant] 60 mg PEG/G-TUBE DAILY@0930 02/24/20 03/15/20 History


 


L.acidoph,Paracasei, B.lactis 1 cap PEG/G-TUBE Q72H 02/24/20 03/15/20 History





[Probiotic]    


 


Scopolamine [Scopolamine 1 MG/72 1 patch TRANSDERM Q72H 02/24/20 03/15/20 

History





HR patch]    


 


Tamsulosin [Flomax] 0.4 mg PEG/G-TUBE DAILY@0930 02/24/20 03/15/20 History


 


Magnesium Citrate 5 oz PO ONCE PRN 03/15/20 03/15/20 History


 


Magnesium Hydroxide [Milk of 4,800 mg PO HS PRN 03/15/20 03/15/20 History





Magnesia]    


 


Na Phos,M-B/Na Phos,Di-Ba [Fleet 133 ml RECTAL ONCE PRN 03/15/20 03/15/20 

History





Adult]    


 


Rufinamide [Banzel] 1,600 mg PO BID@0000,1200 03/15/20 03/15/20 History








                                    Allergies











Allergy/AdvReac Type Severity Reaction Status Date / Time


 


amoxicillin trihydrate Allergy  Unknown Verified 03/15/20 11:56





[From Augmentin]     


 


ceftriaxone sodium Allergy  Unknown Verified 03/15/20 11:56





[From Rocephin]     


 


cephalexin monohydrate Allergy  Unknown Verified 03/15/20 11:56





[From Keflex]     


 


Penicillins Allergy  Unknown Verified 03/15/20 11:56


 


phenobarbital Allergy  Unknown Verified 03/15/20 11:56


 


phenytoin sodium Allergy  Unknown Verified 03/15/20 11:56





[From Dilantin]     


 


phenytoin sodium extended Allergy  Unknown Verified 03/15/20 11:56





[From Dilantin]     


 


potassium clavulanate Allergy  Unknown Verified 03/15/20 11:56





[From Augmentin]     


 


Cephalosporins AdvReac  Unknown Verified 03/15/20 11:56


 


lorazepam [From Ativan] AdvReac  Unknown Verified 03/15/20 11:56














Physical Exam


Vitals: 


                                   Vital Signs











  Temp Pulse Pulse Resp BP BP Pulse Ox


 


 03/15/20 18:17  98.3 F   74  21   96/58  95


 


 03/15/20 16:56   98   18  148/88   98


 


 03/15/20 13:22   92     


 


 03/15/20 13:02   89     


 


 03/15/20 11:46  97.8 F  68   18  154/94   97








                                Intake and Output











 03/15/20 03/15/20 03/15/20





 06:59 14:59 22:59


 


Other:   


 


  Voiding Method   Indwelling Catheter


 


  Weight  74.843 kg 74.843 kg











GENERAL DESCRIPTION: Middle-aged male lying in bed, no distress. No tachypnea or

accessory muscle of respiration use.


HEENT: Shows Pallor , no scleral icterus. Oral mucous membrane is dry. No 

pharyngeal erythema or thrush


NECK: Trachea central, no thyromegaly.


LUNGS: Unlabored breathing.  Decreased breath sound at the base. No wheeze or 

crackle.


HEART: S1, S2, regular rate and rhythm. No loud murmur


ABDOMEN: Soft, no tenderness , guarding or rigidity, no organomegaly


EXTREMITIES: No edema of feet.


SKIN: No rash, no masses palpable.


NEUROLOGICAL: The patient is awake, orientation could not determine as the patie

nt with underlying autism and developmental delay








Results


CBC & Chem 7: 


                                 03/16/20 07:42





                                 03/16/20 07:42


Labs: 


                  Abnormal Lab Results - Last 24 Hours (Table)











  03/15/20 03/15/20 03/15/20 Range/Units





  12:24 12:24 16:39 


 


WBC  16.4 H    (3.8-10.6)  k/uL


 


RDW  15.9 H    (11.5-15.5)  %


 


Neutrophils #  15.0 H    (1.3-7.7)  k/uL


 


Lymphocytes #  0.5 L    (1.0-4.8)  k/uL


 


Potassium   3.2 L   (3.5-5.1)  mmol/L


 


Glucose   121 H   (74-99)  mg/dL


 


Alkaline Phosphatase   129 H   ()  U/L


 


Ur Specific Gravity    1.049 H  (1.001-1.035)  


 


Urine Protein    1+ H  (Negative)  


 


Urine Ketones    1+ H  (Negative)  


 


Urine Blood    Small H  (Negative)  


 


Ur Leukocyte Esterase    Large H  (Negative)  


 


Urine RBC    57 H  (0-5)  /hpf


 


Urine WBC    >182 H  (0-5)  /hpf


 


Urine Bacteria    Rare H  (None)  /hpf


 


Urine Mucus    Few H  (None)  /hpf














Assessment and Plan


Assessment: 


1-patient presented to hospital  with vomiting in this patient who did have 

elevated white count presentation though no temperature and CT abdominal pelvis 

has been suggestive of colonic ileus and bilateral lobe infiltrate underlying 

aspiration pneumonitis not entirely excluded.


2-patient with multiple antibiotic allergies that would limit the number of 

antibiotics safe to use





(1) Allergy to antibiotic


Current Visit: Yes   Status: Acute   Code(s): Z88.1 - ALLERGY STATUS TO OTHER 

ANTIBIOTIC AGENTS STATUS   SNOMED Code(s): 564973265675763


   





(2) Aspiration pneumonia


Current Visit: Yes   Status: Acute   Code(s): J69.0 - PNEUMONITIS DUE TO 

INHALATION OF FOOD AND VOMIT   SNOMED Code(s): 854663597


   


Plan: 


1-try to obtain a sputum for Gram stain and culture


2-Levaquin 500 mg daily, however will add Flagyl 500 mg IV every 8 hour


3-IV fluids


We will follow on clinical condition and cultures to further adjust medication 

if needed


Thank you for this consultation will follow this patient along with you





Time with Patient: Greater than 30

## 2020-03-16 NOTE — XR
Abdomen

 

HISTORY: Bowel obstruction

 

Frontal view of the abdomen correlated to prior , CT 3/15/2020

 

 

 

The air-filled loops of small and large bowel are present. Retained fecal debris is present within th
e rectum. Metallic coils are present and grouped in the left upper quadrant. No evident pneumoperiton
eum or pathologic calcification. Lung bases are not included on exam.

 

IMPRESSION: The large amount of fecal debris seen in the sigmoid and rectum on prior exam is not evid
ent on today's exam, correlate for fecal stasis. Correlate for ileus. I have seen and seen the patient., discussed with  resident and agree with the findings and plan as documented. Counseled the Patient in regards to next steps.

## 2020-03-16 NOTE — P.CNPUL
History of Present Illness


Consult date: 03/16/20


Reason for consult: dyspnea, hypoxemia, pneumonia


Chief complaint: Aspiration pneumonia


History of present illness: 


This is a 30-year-old status post trach and PEG due to developmental delay, 

patient has a history of chronic seizure disorder, patient's PEG tube has been 

recently changed about 2 months ago developed problems associated with severe 

constipation and vomiting and bluish blood-tinged secretions coming out through 

the PEG tube came into the hospital for further evaluation suspicion of 

pneumonia, chest x-ray revealed bilateral lower lobe infiltrate consistent with 

aspiration pneumonia patient is currently getting broad-spectrum antibiotics 

finally he has a bowel movement is abdomen is more softer now, patient also has 

a computed tomography scan of the abdominal pelvis which revealed dilated loops 

of bowel and colon








Review of Systems


ROS unobtainable: due to mental status





Past Medical History


Past Medical History: Asthma, Respiratory Disorder, Seizure Disorder


Additional Past Medical History / Comment(s): developmental delay, autism, last 

sz last night partial complex, trach, intra abdominal wall abcess


History of Any Multi-Drug Resistant Organisms: None Reported


Additional Past Surgical History / Comment(s): vagal nerve stimulator 06/2015, 

left ureter stent, embolist surgery, heel cord lengthening, trans urethral 

prostate incision


Past Anesthesia/Blood Transfusion Reactions: No Reported Reaction


Past Psychological History: ADD/ADHD


Smoking Status: Never smoker


Past Alcohol Use History: None Reported


Past Drug Use History: None Reported





Medications and Allergies


                                Home Medications











 Medication  Instructions  Recorded  Confirmed  Type


 


Clobazam [Onfi] 20 mg PEG/G-TUBE BID@0000,1200 01/30/16 03/15/20 History


 


Lacosamide [Vimpat] 200 mg PEG/G-TUBE 01/30/16 03/15/20 History





 TID@0000,0930,1700   


 


Ascorbic Acid [Vitamin C] 500 mg PEG/G-TUBE BID@0930,1700 06/03/19 03/15/20 

History


 


Folic Acid 1 mg PEG/G-TUBE DAILY@0930 06/03/19 03/15/20 History


 


Polyethylene Glycol 3350 [Miralax] 17 gm PEG/G-TUBE DAILY@0930 06/03/19 03/15/20

 History


 


Vitamin B Complex 1 cap PEG/G-TUBE DAILY@0930 06/03/19 03/15/20 History


 


clonazePAM [KlonoPIN] 1 mg PEG/G-TUBE TID@0000,0930,1700 06/03/19 03/15/20 

History


 


levETIRAcetam [Keppra Oral 1,800 mg PEG/G-TUBE 06/03/19 03/15/20 History





Solution] TID@0000,0930,1700   


 


Albuterol Nebulized [Ventolin 5 mg INHALATION Q12H 02/24/20 03/15/20 History





Nebulized]    


 


Cbd Oil 100 mg PEG/G-TUBE BID@0930,1200 02/24/20 03/15/20 History


 


Dexlansoprazole [Dexilant] 60 mg PEG/G-TUBE DAILY@0930 02/24/20 03/15/20 History


 


L.acidoph,Paracasei, B.lactis 1 cap PEG/G-TUBE Q72H 02/24/20 03/15/20 History





[Probiotic]    


 


Scopolamine [Scopolamine 1 MG/72 1 patch TRANSDERM Q72H 02/24/20 03/15/20 

History





HR patch]    


 


Tamsulosin [Flomax] 0.4 mg PEG/G-TUBE DAILY@0930 02/24/20 03/15/20 History


 


Magnesium Citrate 5 oz PO ONCE PRN 03/15/20 03/15/20 History


 


Magnesium Hydroxide [Milk of 4,800 mg PO HS PRN 03/15/20 03/15/20 History





Magnesia]    


 


Na Phos,M-B/Na Phos,Di-Ba [Fleet 133 ml RECTAL ONCE PRN 03/15/20 03/15/20 Hist

ory





Adult]    


 


Rufinamide [Banzel] 1,600 mg PO BID@0000,1200 03/15/20 03/15/20 History








                                    Allergies











Allergy/AdvReac Type Severity Reaction Status Date / Time


 


amoxicillin trihydrate Allergy  Unknown Verified 03/15/20 11:56





[From Augmentin]     


 


ceftriaxone sodium Allergy  Unknown Verified 03/15/20 11:56





[From Rocephin]     


 


cephalexin monohydrate Allergy  Unknown Verified 03/15/20 11:56





[From Keflex]     


 


Penicillins Allergy  Unknown Verified 03/15/20 11:56


 


phenobarbital Allergy  Unknown Verified 03/15/20 11:56


 


phenytoin sodium Allergy  Unknown Verified 03/15/20 11:56





[From Dilantin]     


 


phenytoin sodium extended Allergy  Unknown Verified 03/15/20 11:56





[From Dilantin]     


 


potassium clavulanate Allergy  Unknown Verified 03/15/20 11:56





[From Augmentin]     


 


Cephalosporins AdvReac  Unknown Verified 03/15/20 11:56


 


lorazepam [From Ativan] AdvReac  Unknown Verified 03/15/20 11:56














Physical Exam


Vitals: 


                                   Vital Signs











  Temp Pulse Pulse Resp BP BP Pulse Ox


 


 03/16/20 11:56   84     


 


 03/16/20 11:44   86     


 


 03/16/20 08:11   82     


 


 03/16/20 07:56   80     


 


 03/16/20 07:12  97.9 F   65  18   90/57  95


 


 03/16/20 00:27  97.3 F L   84  14   94/50  92 L


 


 03/15/20 21:10   86     


 


 03/15/20 20:59   88     


 


 03/15/20 18:17  98.3 F   74  21   96/58  95


 


 03/15/20 16:56   98   18  148/88   98


 


 03/15/20 13:22   92     








                                Intake and Output











 03/15/20 03/16/20 03/16/20





 22:59 06:59 14:59


 


Intake Total 550 500 420


 


Output Total 950 200 


 


Balance -400 300 420


 


Intake:   


 


  Intake, IV Titration 550 500 300





  Amount   


 


    Clindamycin 600 mg In 50  





    Dextrose 5% in Water 50   





    ml @ 50 mls/hr IVPB ONCE   





    [facility] Rx#:[id number]   


 


    Lacosamide  mg In 100  50





    Sodium Chloride 0.9% 50   





    ml @ 100 mls/hr IVPB BID   





    [facility] Rx#:[id number]   


 


    Sodium Chloride 0.9% 1, 400 400 150





    000 ml @ 50 mls/hr IV .   





    Q20H [facility] Rx#:[id number]   


 


    metroNIDAZOLE-NS   100 100





    mg In Saline 1 100ml.bag   





    @ 100 mls/hr IVPB Q8HR   





    [facility] Rx#:[id number]   


 


  Tube Feeding   120


 


Output:   


 


  Urine 950 200 


 


    Uretheral ([place]) 950 200 


 


Other:   


 


  Voiding Method Indwelling Catheter Indwelling Catheter Indwelling Catheter


 


  Weight 74.843 kg  














- Constitutional


General appearance: average body habitus, disheveled, no acute distress





- EENT


Eyes: EOMI, PERRLA


Ears: bilateral: normal





- Neck


Neck: normal ROM


Carotids: bilateral: upstroke normal





- Respiratory


Respiratory: bilateral: CTA, diminished, negative: dullness, rales, rhonchi





- Cardiovascular


Rhythm: regular


Heart sounds: normal: S1, S2





- Gastrointestinal


General gastrointestinal: decreased bowel sounds





- Musculoskeletal


Musculoskeletal: generalized weakness, strength equal bilaterally





Results





- Laboratory Findings


CBC and BMP: 


                                 03/16/20 07:42





                                 03/16/20 07:42


PT/INR, D-dimer











PT  9.6 sec (9.0-12.0)   03/15/20  12:24    


 


INR  0.9  (<1.2)   03/15/20  12:24    








Abnormal lab findings: 


                                  Abnormal Labs











  03/15/20 03/15/20 03/15/20





  12:24 12:24 16:39


 


WBC  16.4 H  


 


RBC   


 


Hgb   


 


Hct   


 


RDW  15.9 H  


 


Neutrophils #  15.0 H  


 


Lymphocytes #  0.5 L  


 


Potassium   3.2 L 


 


Chloride   


 


Glucose   121 H 


 


Alkaline Phosphatase   129 H 


 


Ur Specific Gravity    1.049 H


 


Urine Protein    1+ H


 


Urine Ketones    1+ H


 


Urine Blood    Small H


 


Ur Leukocyte Esterase    Large H


 


Urine RBC    57 H


 


Urine WBC    >182 H


 


Urine Bacteria    Rare H


 


Urine Mucus    Few H














  03/16/20 03/16/20





  07:42 07:42


 


WBC  


 


RBC  3.56 L 


 


Hgb  11.6 L 


 


Hct  35.4 L 


 


RDW  16.0 H 


 


Neutrophils #  


 


Lymphocytes #  


 


Potassium   3.3 L


 


Chloride   110 H


 


Glucose  


 


Alkaline Phosphatase  


 


Ur Specific Gravity  


 


Urine Protein  


 


Urine Ketones  


 


Urine Blood  


 


Ur Leukocyte Esterase  


 


Urine RBC  


 


Urine WBC  


 


Urine Bacteria  


 


Urine Mucus  














- Diagnostic Findings


Chest x-ray: report reviewed, image reviewed (Finding as noted above)





Assessment and Plan


Assessment: 





Acute hypoxic respiratory failure





Bilateral aspiration pneumonia





Bowel obstruction likely due to retained fecal material





Developmentally delayed





Status post chronic Peg tube and tracheostomy





Severe degree of seizure disorder





Past history of respiratory failure requiring vent support


Plan: 





Maintain trach keep saturation over 92% supplemental oxygen as needed





Pulmonary toilet





Respiratory to suction as needed





Seizure precautions





Nothing by mouth for now





Hold tube feed





Broad-spectrum antibiotics for bilateral aspiration pneumonia





Further recommendations pending plan of care as per clinical response of the 

patient








Time with Patient: Greater than 30

## 2020-03-16 NOTE — CDI
Documentation Clarification Form



Date: 03/16/2020 04:38:15 PM

From: Rocio Vaca RN CCDS

Phone: [phone]

MRN: [id number]

Admit Date: 03/15/2020 03:51:00 PM

Patient Name: Raj Silva

Visit Number: [id number]

Discharge Date:  



ATTENTION: The Clinical Documentation Specialists (CDI) and [facility] Coding Staff 
appreciate your assistance in clarifying documentation. Please respond to the 
clarification below the line at the bottom and electronically sign. The CDI & 
[facility] Coding staff will review the response and follow-up if needed. Please note: 
Queries are made part of the Legal Health Record. If you have any questions, 
please contact the author of this message via ITS.



Dr. Gentry Kingsley



Your patient has a documented diagnosis of Acute Hypoxic Respiratory Failure 
which may lack sufficient clinical evidence/support. In your 3/16 Consult 



History/Risk Factors: 30-year-old male presents to the ED with black coffee 
ground emesis, a black substance coming from peg tube and severe vomiting. 
Medical history of Developmental delay, Autism, Tracheostomy and Peg tube.

Clinical Indicators:

3/15 11:46 /94 69 97.8 18 97% room air

3/16 00:27 RR 14 oxygen saturation 92% room air 

3/16 07:12 RR 18 oxygen saturation 95% room air 

Per your Pulmonology consult 3/16 Maintain trach keep saturation over 92% 
supplemental oxygen as needed

Per your Pulmonology consult Past history of respiratory failure requiring vent
support.

Treatment:   Pulmonary toilet, Respiratory to suction as needed. Supplemental 
oxygen as needed



Based on the clinical evidence and your professional judgment, do you feel Acute
Hypoxic Respiratory is a valid diagnosis?

                                                                                
                                     

No, Acute Hypoxic Respiratory Failure is ruled out.  



Other (please specify diagnosis) __Pneumonia____________________









(Last Revision: January 2020)



MTDD

## 2020-03-17 LAB
ANION GAP SERPL CALC-SCNC: 7 MMOL/L
BASOPHILS # BLD AUTO: 0 K/UL (ref 0–0.2)
BASOPHILS NFR BLD AUTO: 0 %
BUN SERPL-SCNC: 13 MG/DL (ref 9–20)
CALCIUM SPEC-MCNC: 8.6 MG/DL (ref 8.4–10.2)
CHLORIDE SERPL-SCNC: 111 MMOL/L (ref 98–107)
CO2 SERPL-SCNC: 22 MMOL/L (ref 22–30)
EOSINOPHIL # BLD AUTO: 0.1 K/UL (ref 0–0.7)
EOSINOPHIL NFR BLD AUTO: 2 %
ERYTHROCYTE [DISTWIDTH] IN BLOOD BY AUTOMATED COUNT: 3.32 M/UL (ref 4.3–5.9)
ERYTHROCYTE [DISTWIDTH] IN BLOOD: 15.8 % (ref 11.5–15.5)
GLUCOSE SERPL-MCNC: 84 MG/DL (ref 74–99)
HCT VFR BLD AUTO: 33.4 % (ref 39–53)
HGB BLD-MCNC: 11 GM/DL (ref 13–17.5)
LYMPHOCYTES # SPEC AUTO: 1 K/UL (ref 1–4.8)
LYMPHOCYTES NFR SPEC AUTO: 20 %
MCH RBC QN AUTO: 33.2 PG (ref 25–35)
MCHC RBC AUTO-ENTMCNC: 33.1 G/DL (ref 31–37)
MCV RBC AUTO: 100.5 FL (ref 80–100)
MONOCYTES # BLD AUTO: 0.3 K/UL (ref 0–1)
MONOCYTES NFR BLD AUTO: 5 %
NEUTROPHILS # BLD AUTO: 3.7 K/UL (ref 1.3–7.7)
NEUTROPHILS NFR BLD AUTO: 71 %
PLATELET # BLD AUTO: 213 K/UL (ref 150–450)
POTASSIUM SERPL-SCNC: 3.2 MMOL/L (ref 3.5–5.1)
SODIUM SERPL-SCNC: 140 MMOL/L (ref 137–145)
WBC # BLD AUTO: 5.2 K/UL (ref 3.8–10.6)

## 2020-03-17 RX ADMIN — IPRATROPIUM BROMIDE AND ALBUTEROL SULFATE SCH ML: .5; 3 SOLUTION RESPIRATORY (INHALATION) at 12:32

## 2020-03-17 RX ADMIN — LEVETIRACETAM SCH MG: 100 SOLUTION ORAL at 01:08

## 2020-03-17 RX ADMIN — ASPIRIN SCH MG: 325 TABLET ORAL at 01:08

## 2020-03-17 RX ADMIN — PANTOPRAZOLE SODIUM SCH MG: 40 INJECTION, POWDER, FOR SOLUTION INTRAVENOUS at 09:05

## 2020-03-17 RX ADMIN — METRONIDAZOLE SCH MLS/HR: 500 INJECTION, SOLUTION INTRAVENOUS at 15:37

## 2020-03-17 RX ADMIN — CEFAZOLIN SCH MLS/HR: 330 INJECTION, POWDER, FOR SOLUTION INTRAMUSCULAR; INTRAVENOUS at 07:44

## 2020-03-17 RX ADMIN — HEPARIN SODIUM SCH UNIT: 5000 INJECTION, SOLUTION INTRAVENOUS; SUBCUTANEOUS at 20:03

## 2020-03-17 RX ADMIN — LACOSAMIDE SCH MLS/HR: 10 INJECTION INTRAVENOUS at 09:04

## 2020-03-17 RX ADMIN — METRONIDAZOLE SCH MLS/HR: 500 INJECTION, SOLUTION INTRAVENOUS at 07:42

## 2020-03-17 RX ADMIN — METRONIDAZOLE SCH MLS/HR: 500 INJECTION, SOLUTION INTRAVENOUS at 01:09

## 2020-03-17 RX ADMIN — LEVOFLOXACIN SCH MLS/HR: 750 INJECTION, SOLUTION INTRAVENOUS at 17:02

## 2020-03-17 RX ADMIN — HEPARIN SODIUM SCH UNIT: 5000 INJECTION, SOLUTION INTRAVENOUS; SUBCUTANEOUS at 09:05

## 2020-03-17 RX ADMIN — LEVETIRACETAM SCH MG: 100 SOLUTION ORAL at 09:05

## 2020-03-17 RX ADMIN — FOLIC ACID SCH MG: 1 TABLET ORAL at 09:06

## 2020-03-17 RX ADMIN — LACOSAMIDE SCH MLS/HR: 10 INJECTION INTRAVENOUS at 22:22

## 2020-03-17 RX ADMIN — ASPIRIN SCH MG: 325 TABLET ORAL at 11:37

## 2020-03-17 RX ADMIN — SCOPALAMINE SCH PATCH: 1 PATCH, EXTENDED RELEASE TRANSDERMAL at 09:29

## 2020-03-17 RX ADMIN — IPRATROPIUM BROMIDE AND ALBUTEROL SULFATE SCH ML: .5; 3 SOLUTION RESPIRATORY (INHALATION) at 08:42

## 2020-03-17 RX ADMIN — LEVETIRACETAM SCH MG: 100 SOLUTION ORAL at 17:02

## 2020-03-17 RX ADMIN — HYDROMORPHONE HYDROCHLORIDE PRN MG: 1 INJECTION, SOLUTION INTRAMUSCULAR; INTRAVENOUS; SUBCUTANEOUS at 22:26

## 2020-03-17 RX ADMIN — IPRATROPIUM BROMIDE AND ALBUTEROL SULFATE SCH ML: .5; 3 SOLUTION RESPIRATORY (INHALATION) at 19:49

## 2020-03-17 RX ADMIN — TAMSULOSIN HYDROCHLORIDE SCH MG: 0.4 CAPSULE ORAL at 09:06

## 2020-03-17 RX ADMIN — PANTOPRAZOLE SODIUM SCH MG: 40 INJECTION, POWDER, FOR SOLUTION INTRAVENOUS at 20:03

## 2020-03-17 NOTE — P.PN
Subjective


Progress Note Date: 03/17/20





CHIEF COMPLAINT: bowel obstruction





HISTORY OF PRESENT ILLNESS: Patient examined at the bedside with Dr. Mario. 

Caregiver present. He received soap suds enemas yesterday with moderate stool 

output per nursing. No nausea or vomiting today. 





PHYSICAL EXAM: 


VITAL SIGNS: Reviewed.


GENERAL: Well-developed in no acute distress. 


HEENT:  Trach noted. No sclera icterus. Extraocular movements grossly intact.  

Moist buccal mucosa. Head is atraumatic, normocephalic. 


ABDOMEN:  Soft.  Distended. Nontender. PEG tube noted.


NEUROLOGIC: Nonverbal at baseline





ASSESSMENT: 


1.  Vomiting


2.  Megacolon, colonic ileus


3.  History of trach and PEG





PLAN: 


-2L GoLYTELY to be given today via PEG tube


-If patient has good results with GoLYTELY will reimage abdomen tomorrow.  If 

not we will proceed with additional 2 L of GoLYTELY tomorrow





Nurse practitioner note has been reviewed by physician. Signing provider agrees 

with the documented findings, assessment, and plan of care. 








Objective





- Vital Signs


Vital signs: 


                                   Vital Signs











Temp  97.8 F   03/17/20 07:16


 


Pulse  78   03/17/20 12:46


 


Resp  22   03/17/20 08:42


 


BP  91/51   03/17/20 07:16


 


Pulse Ox  93 L  03/17/20 07:16








                                 Intake & Output











 03/16/20 03/17/20 03/17/20





 18:59 06:59 18:59


 


Intake Total 420  600


 


Output Total  500 420


 


Balance 420 -500 180


 


Intake:   


 


  Intake, IV Titration 300  400





  Amount   


 


    Lacosamide  mg In 50  50





    Sodium Chloride 0.9% 50   





    ml @ 100 mls/hr IVPB BID   





    [facility] Rx#:[id number]   


 


    Sodium Chloride 0.9% 1, 150  250





    000 ml @ 50 mls/hr IV .   





    Q20H [facility] Rx#:[id number]   


 


    metroNIDAZOLE-NS  100  100





    mg In Saline 1 100ml.bag   





    @ 100 mls/hr IVPB Q8HR   





    [facility] Rx#:[id number]   


 


  Tube Feeding 120  


 


  Other   200


 


Output:   


 


  Urine  500 420


 


Other:   


 


  Voiding Method Indwelling Catheter Indwelling Catheter Diaper


 


  # Voids   1


 


  # Bowel Movements 1  














- Labs


CBC & Chem 7: 


                                 03/17/20 07:20





                                 03/17/20 07:20


Labs: 


                  Abnormal Lab Results - Last 24 Hours (Table)











  03/17/20 03/17/20 Range/Units





  07:20 07:20 


 


RBC  3.32 L   (4.30-5.90)  m/uL


 


Hgb  11.0 L   (13.0-17.5)  gm/dL


 


Hct  33.4 L   (39.0-53.0)  %


 


MCV  100.5 H   (80.0-100.0)  fL


 


RDW  15.8 H   (11.5-15.5)  %


 


Potassium   3.2 L  (3.5-5.1)  mmol/L


 


Chloride   111 H  ()  mmol/L








                      Microbiology - Last 24 Hours (Table)











 03/15/20 12:24 Blood Culture - Preliminary





 Blood    No Growth after 24 hours


 


 03/15/20 22:00 Urine Culture - Preliminary





 Urine,Catheterized

## 2020-03-17 NOTE — PN
PROGRESS NOTE



DATE OF SERVICE:

03/17/2020



This 30-year-old gentleman who was admitted with acute upper gastrointestinal 
bleeding

also had possible bowel obstruction.  Patient also had bibasilar aspiration 
pneumonia.

Patient on antibiotics.  Pulmonary and as well as surgery is following the 
patient

closely.  The surgery was concerned possible megacolon chronically as GoLYTELY 
has been

recommended.  The patient being closely monitored.



PAST MEDICAL HISTORY:

Reviewed.



REVIEW OF SYMPTOMS:

Review of systems could not be taken.



CURRENT MEDICATIONS:

1. Tylenol.

2. DuoNeb q.i.d. and p.r.n.

3. Klonopin 1 mg per PEG daily.

4. Folic acid.

5. Heparin.

6. Dilaudid.

8. Lactobacillus.

9. Keppra.

10.Levaquin.

11.Flagyl.

12.Morphine.

13.Narcan.

14.Clobazam.

15.Zofran.

16.Protonix.

17.Scopolamine.

18.Flomax.



PHYSICAL EXAM:

Patient is stuporous.  Pulse 74.. Blood pressure 108/62, respirations 18, temp 
98.1,

pulse ox 94% on room air.

HEENT: Conjunctivae normal.

NECK: No JVD.

CARDIOVASCULAR: S1, S2 muffled.

RESPIRATIONS: Breath sounds diminished in the bases.  A few scattered rhonchi 
and

crackles.

ABDOMEN:  Soft, mild diffuse distention.

NERVOUS SYSTEM:  Unchanged.



LAB:

 sodium 140, potassium 3.2.  Urine WBC more than 182,  positive and urine

culture showed group D Enterococcus.



ASSESSMENT:

1. Acute upper gastrointestinal bleeding with possibly peptic ulcer disease.

2. Possible bibasilar aspiration pneumonia with possible sepsis.

3. Change in mental status, metabolic encephalopathy, acute on chronic.

4. Group B Enterococcus from the urine culture.

5. Possible bowel obstruction with colonic distention possibly secondary to

    constipation.

6. Possible megacolon.

7. Possible acute urinary tract infection present on admission.

8. Hypokalemia.

9. History of asthma.

10.History of seizure disorder.

11.History of development delay.

12.History of autism.

13.History of seizure disorder, partial complex.

14.History of intraabdominal wall abscess.

15.History of vagal nerve stimulator.

16.History of PEG tube placement.

17.Left ureteral stent placement history.

18.History of attention-deficit disorder/attention-deficit/hyperactivity 
disorder.

19.FULL CODE.



RECOMMENDATIONS AND DISCUSSION:

This 30-year-old gentleman with multiple complex medical issues, we will monitor
the

patient closely, continue the current medications, management and symptomatic

treatment.  We will await the sensitivities of enterococci.  Otherwise, we will

continue to follow up with Gastroenterology and surgery and Pulmonary.  Guarded

prognosis because of multiple complex medical issues.  Infectious Disease was 
also

consulted.  See orders for further details.  Discussed with the mother who 
understands

and agrees.  Further recommendations to follow.





MMODL / IJN: [id number] / Job#: [id number]

MTDD

## 2020-03-17 NOTE — PN
PROGRESS NOTE



DATE OF DICTATION:

03/17/2020



The patient is a 30-year-old pleasant white male with history of autism and

developmental delay disorder who has a PEG tube placement in 2018, presents to the

hospital with coffee-ground emesis and severe constipation.  He had no further episodes

of nausea, vomiting, or coffee-grounds emesis.  Presently, he is n.p.o. He was given

enemas in the ER for colonic distention with fecal stasis and had 2 large bowel

movements.  Repeat abdominal x-rays yesterday afternoon did show fecal stasis in the

sigmoid colon and rectum.  He did not have any further bowel movements.  He was started

on MiraLAX yesterday. As per the nursing staff and the family, he denies any symptoms.



PHYSICAL EXAMINATION:

On physical examination, he appears comfortable, no apparent distress.

Vital signs are stable.  Blood pressure 91/51, pulse rate 55, temperature of 97.8.

HEENT EXAMINATION: Unremarkable. Conjunctivae pink. Sclerae anicteric.  Oral cavity no

lesions.

NECK: No JVD or lymph node enlargement.

CHEST: Clear to auscultation.

HEART:  Regular rate and rhythm.

ABDOMEN:  Soft. It was nondistended, nontender.

EXTREMITIES: No pedal edema.

SKIN: No rashes.

NEURO: He is awake, not oriented to name or place.



LABS:

WBC 5.2, hemoglobin 11, platelets normal.  Basic metabolic panel is within normal

limits.  Potassium is 3.2.



IMPRESSION:

1. Upper gastrointestinal bleed/coffee-ground emesis, resolved.  Hemoglobin stable at

    11 g/dL.

2. Severe constipation with dilated colon.  The patient received a few enemas in the

    ER with some relief.  Now continues to have persistent constipation.



RECOMMENDATIONS:

1. Continue with Protonix 40 mg daily.

2. Monitor CBC daily.

3. Give him Fleet enema x1 today and as per surgical recommendations we will start him

    on GoLYTELY prep.

4. Repeat abdominal x-rays tomorrow.

5. We will follow with you closely.

Thank you for this consultation.





MMODL / IJN: [id number] / Job#: [id number]

## 2020-03-17 NOTE — PN
PROGRESS NOTE



DATE OF SERVICE:

03/17/2020.



REASON FOR FOLLOWUP:

Aspiration pneumonitis.



INTERVAL HISTORY:

The patient is currently afebrile.  Patient has been breathing comfortably.  No 
further

vomiting has been reported or any worsening cough.  No respiratory distress.  
Did not

have a bowel movement today.  The patient unable to provide any history.  Most

information provided by the aunt who is the caregiver.



PHYSICAL EXAMINATION:

Blood pressure 111/61 with a pulse of 76.  Temperature is 97.8.  He is 95% on 
room air.

General description is a middle-aged male lying in bed in no distress.  
Respiratory

system: Unlabored breathing.  Decreased breath sounds in the bases.  No wheeze. 
Heart

S1, S2.  Regular rate and rhythm. Abdomen soft, no tenderness.



LABS:

Hemoglobin 11, white count 5.2.  BUN of 13, creatinine 0.74.  UA Group D 
Enterococcus.



DIAGNOSTIC IMPRESSION AND PLAN:

Patient admitted to the hospital with acute nausea, vomiting in this patient who
did

have evidence of large bowel obstruction.  Patient  multiple ANTIBIOTIC 
ALLERGIES

currently covered with Levaquin and Flagyl to continue and monitor clinical 
course

closely.





MMODL / IJN: [id number] / Job#: [id number]

MTDD

## 2020-03-17 NOTE — P.PN
Subjective


Progress Note Date: 03/17/20


Principal diagnosis: 


Acute hypoxic respiratory failure





Bilateral aspiration pneumonia





Bowel obstruction likely due to retained fecal material





Developmentally delayed





Status post chronic Peg tube and tracheostomy





Severe degree of seizure disorder





Past history of respiratory failure requiring vent support








03/17/2020, patient seen and evaluated examined overall no significant change 

and no cough or congestion is present no more episodes of vomiting has been 

noted patient is getting therapy for pneumonia ID service has been following 

respiratory status remains stable





This is a 30-year-old status post trach and PEG due to developmental delay, 

patient has a history of chronic seizure disorder, patient's PEG tube has been 

recently changed about 2 months ago developed problems associated with severe 

constipation and vomiting and bluish blood-tinged secretions coming out through 

the PEG tube came into the hospital for further evaluation suspicion of 

pneumonia, chest x-ray revealed bilateral lower lobe infiltrate consistent with 

aspiration pneumonia patient is currently getting broad-spectrum antibiotics 

finally he has a bowel movement is abdomen is more softer now, patient also has 

a computed tomography scan of the abdominal pelvis which revealed dilated loops 

of bowel and colon








Objective





- Vital Signs


Vital signs: 


                                   Vital Signs











Temp  97.8 F   03/17/20 07:16


 


Pulse  78   03/17/20 12:46


 


Resp  22   03/17/20 08:42


 


BP  91/51   03/17/20 07:16


 


Pulse Ox  93 L  03/17/20 07:16








                                 Intake & Output











 03/16/20 03/17/20 03/17/20





 18:59 06:59 18:59


 


Intake Total 420  600


 


Output Total  500 420


 


Balance 420 -500 180


 


Intake:   


 


  Intake, IV Titration 300  400





  Amount   


 


    Lacosamide  mg In 50  50





    Sodium Chloride 0.9% 50   





    ml @ 100 mls/hr IVPB BID   





    [facility] Rx#:[id number]   


 


    Sodium Chloride 0.9% 1, 150  250





    000 ml @ 50 mls/hr IV .   





    Q20H [facility] Rx#:[id number]   


 


    metroNIDAZOLE-NS  100  100





    mg In Saline 1 100ml.bag   





    @ 100 mls/hr IVPB Q8HR   





    [facility] Rx#:[id number]   


 


  Tube Feeding 120  


 


  Other   200


 


Output:   


 


  Urine  500 420


 


Other:   


 


  Voiding Method Indwelling Catheter Indwelling Catheter Diaper


 


  # Voids   1


 


  # Bowel Movements 1  














- Exam


- Constitutional


General appearance: average body habitus, disheveled, no acute distress





- EENT


Eyes: EOMI, PERRLA


Ears: bilateral: normal





- Neck


Neck: normal ROM


Carotids: bilateral: upstroke normal





- Respiratory


Respiratory: bilateral: CTA, diminished, negative: dullness, rales, rhonchi





- Cardiovascular


Rhythm: regular


Heart sounds: normal: S1, S2





- Gastrointestinal


General gastrointestinal: decreased bowel sounds





- Musculoskeletal


Musculoskeletal: generalized weakness, strength equal bilaterally











- Labs


CBC & Chem 7: 


                                 03/17/20 07:20





                                 03/17/20 07:20


Labs: 


                  Abnormal Lab Results - Last 24 Hours (Table)











  03/17/20 03/17/20 Range/Units





  07:20 07:20 


 


RBC  3.32 L   (4.30-5.90)  m/uL


 


Hgb  11.0 L   (13.0-17.5)  gm/dL


 


Hct  33.4 L   (39.0-53.0)  %


 


MCV  100.5 H   (80.0-100.0)  fL


 


RDW  15.8 H   (11.5-15.5)  %


 


Potassium   3.2 L  (3.5-5.1)  mmol/L


 


Chloride   111 H  ()  mmol/L








                      Microbiology - Last 24 Hours (Table)











 03/15/20 12:24 Blood Culture - Preliminary





 Blood    No Growth after 24 hours


 


 03/15/20 22:00 Urine Culture - Preliminary





 Urine,Catheterized 














Assessment and Plan


Assessment: 





Acute hypoxic respiratory failure





Bilateral aspiration pneumonia





Bowel obstruction likely due to retained fecal material





Developmentally delayed





Status post chronic Peg tube and tracheostomy





Severe degree of seizure disorder





Past history of respiratory failure requiring vent support


Plan: 





Maintain trach keep saturation over 92% supplemental oxygen as needed





Pulmonary toilet





Respiratory to suction as needed





Seizure precautions





Nothing by mouth for now





Hold tube feed





Broad-spectrum antibiotics for bilateral aspiration pneumonia





Further recommendations pending plan of care as per clinical response of the 

patient








Time with Patient: Greater than 30

## 2020-03-18 LAB
ANION GAP SERPL CALC-SCNC: 9 MMOL/L
BASOPHILS # BLD AUTO: 0 K/UL (ref 0–0.2)
BASOPHILS NFR BLD AUTO: 0 %
BUN SERPL-SCNC: 8 MG/DL (ref 9–20)
CALCIUM SPEC-MCNC: 8.2 MG/DL (ref 8.4–10.2)
CHLORIDE SERPL-SCNC: 107 MMOL/L (ref 98–107)
CO2 SERPL-SCNC: 23 MMOL/L (ref 22–30)
EOSINOPHIL # BLD AUTO: 0.1 K/UL (ref 0–0.7)
EOSINOPHIL NFR BLD AUTO: 2 %
ERYTHROCYTE [DISTWIDTH] IN BLOOD BY AUTOMATED COUNT: 3.43 M/UL (ref 4.3–5.9)
ERYTHROCYTE [DISTWIDTH] IN BLOOD: 15.7 % (ref 11.5–15.5)
GLUCOSE SERPL-MCNC: 78 MG/DL (ref 74–99)
HCT VFR BLD AUTO: 33.6 % (ref 39–53)
HGB BLD-MCNC: 11.2 GM/DL (ref 13–17.5)
LYMPHOCYTES # SPEC AUTO: 1.1 K/UL (ref 1–4.8)
LYMPHOCYTES NFR SPEC AUTO: 20 %
MAGNESIUM SPEC-SCNC: 1.6 MG/DL (ref 1.6–2.3)
MCH RBC QN AUTO: 32.7 PG (ref 25–35)
MCHC RBC AUTO-ENTMCNC: 33.4 G/DL (ref 31–37)
MCV RBC AUTO: 97.9 FL (ref 80–100)
MONOCYTES # BLD AUTO: 0.2 K/UL (ref 0–1)
MONOCYTES NFR BLD AUTO: 4 %
NEUTROPHILS # BLD AUTO: 4 K/UL (ref 1.3–7.7)
NEUTROPHILS NFR BLD AUTO: 73 %
PLATELET # BLD AUTO: 218 K/UL (ref 150–450)
POTASSIUM SERPL-SCNC: 2.7 MMOL/L (ref 3.5–5.1)
POTASSIUM SERPL-SCNC: 3.2 MMOL/L (ref 3.5–5.1)
SODIUM SERPL-SCNC: 139 MMOL/L (ref 137–145)
WBC # BLD AUTO: 5.5 K/UL (ref 3.8–10.6)

## 2020-03-18 RX ADMIN — CEFAZOLIN SCH MLS/HR: 330 INJECTION, POWDER, FOR SOLUTION INTRAMUSCULAR; INTRAVENOUS at 05:44

## 2020-03-18 RX ADMIN — METOCLOPRAMIDE SCH MG: 5 INJECTION, SOLUTION INTRAMUSCULAR; INTRAVENOUS at 12:22

## 2020-03-18 RX ADMIN — IPRATROPIUM BROMIDE AND ALBUTEROL SULFATE SCH ML: .5; 3 SOLUTION RESPIRATORY (INHALATION) at 21:26

## 2020-03-18 RX ADMIN — TAMSULOSIN HYDROCHLORIDE SCH MG: 0.4 CAPSULE ORAL at 08:01

## 2020-03-18 RX ADMIN — POTASSIUM BICARBONATE SCH MEQ: 782 TABLET, EFFERVESCENT ORAL at 10:03

## 2020-03-18 RX ADMIN — ASPIRIN SCH MG: 325 TABLET ORAL at 11:48

## 2020-03-18 RX ADMIN — HEPARIN SODIUM SCH UNIT: 5000 INJECTION, SOLUTION INTRAVENOUS; SUBCUTANEOUS at 21:37

## 2020-03-18 RX ADMIN — LEVOFLOXACIN SCH MLS/HR: 750 INJECTION, SOLUTION INTRAVENOUS at 16:09

## 2020-03-18 RX ADMIN — PANTOPRAZOLE SODIUM SCH MG: 40 INJECTION, POWDER, FOR SOLUTION INTRAVENOUS at 21:37

## 2020-03-18 RX ADMIN — HYDROMORPHONE HYDROCHLORIDE PRN MG: 1 INJECTION, SOLUTION INTRAMUSCULAR; INTRAVENOUS; SUBCUTANEOUS at 04:18

## 2020-03-18 RX ADMIN — LACOSAMIDE SCH MLS/HR: 10 INJECTION INTRAVENOUS at 10:03

## 2020-03-18 RX ADMIN — METOCLOPRAMIDE SCH MG: 5 INJECTION, SOLUTION INTRAMUSCULAR; INTRAVENOUS at 21:30

## 2020-03-18 RX ADMIN — METRONIDAZOLE SCH MLS/HR: 500 INJECTION, SOLUTION INTRAVENOUS at 00:44

## 2020-03-18 RX ADMIN — LEVETIRACETAM SCH MG: 100 SOLUTION ORAL at 08:00

## 2020-03-18 RX ADMIN — ASPIRIN SCH MG: 325 TABLET ORAL at 00:43

## 2020-03-18 RX ADMIN — LEVETIRACETAM SCH MG: 100 SOLUTION ORAL at 16:09

## 2020-03-18 RX ADMIN — POTASSIUM BICARBONATE SCH MEQ: 782 TABLET, EFFERVESCENT ORAL at 08:36

## 2020-03-18 RX ADMIN — METRONIDAZOLE SCH MLS/HR: 500 INJECTION, SOLUTION INTRAVENOUS at 07:58

## 2020-03-18 RX ADMIN — LEVETIRACETAM SCH MG: 100 SOLUTION ORAL at 00:43

## 2020-03-18 RX ADMIN — POTASSIUM BICARBONATE SCH MEQ: 782 TABLET, EFFERVESCENT ORAL at 23:18

## 2020-03-18 RX ADMIN — IOPAMIDOL PRN ML: 612 INJECTION, SOLUTION INTRAVENOUS at 10:02

## 2020-03-18 RX ADMIN — IPRATROPIUM BROMIDE AND ALBUTEROL SULFATE SCH ML: .5; 3 SOLUTION RESPIRATORY (INHALATION) at 07:20

## 2020-03-18 RX ADMIN — IPRATROPIUM BROMIDE AND ALBUTEROL SULFATE SCH ML: .5; 3 SOLUTION RESPIRATORY (INHALATION) at 13:14

## 2020-03-18 RX ADMIN — HEPARIN SODIUM SCH UNIT: 5000 INJECTION, SOLUTION INTRAVENOUS; SUBCUTANEOUS at 08:00

## 2020-03-18 RX ADMIN — PANTOPRAZOLE SODIUM SCH MG: 40 INJECTION, POWDER, FOR SOLUTION INTRAVENOUS at 08:00

## 2020-03-18 RX ADMIN — LACOSAMIDE SCH MLS/HR: 10 INJECTION INTRAVENOUS at 21:03

## 2020-03-18 RX ADMIN — IOPAMIDOL PRN ML: 612 INJECTION, SOLUTION INTRAVENOUS at 08:42

## 2020-03-18 RX ADMIN — METRONIDAZOLE SCH MLS/HR: 500 INJECTION, SOLUTION INTRAVENOUS at 14:55

## 2020-03-18 RX ADMIN — POTASSIUM BICARBONATE SCH MEQ: 782 TABLET, EFFERVESCENT ORAL at 11:48

## 2020-03-18 RX ADMIN — FOLIC ACID SCH MG: 1 TABLET ORAL at 08:01

## 2020-03-18 NOTE — P.PN
Subjective


Progress Note Date: 03/18/20


Principal diagnosis: 


Acute hypoxic respiratory failure





Bilateral aspiration pneumonia





Bowel obstruction likely due to retained fecal material





Developmentally delayed





Status post chronic Peg tube and tracheostomy





Severe degree of seizure disorder





Past history of respiratory failure requiring vent support





03/18/2020, patient seen and evaluated examined during the rounds overall 

remains stable pulmonary standpoint remains on antibiotics for aspiration 

pneumonia congestion is present, computed tomography scan of the abdomen 

revealed a large hiatal hernia, PEG tube has been stable,





03/17/2020, patient seen and evaluated examined overall no significant change 

and no cough or congestion is present no more episodes of vomiting has been 

noted patient is getting therapy for pneumonia ID service has been following 

respiratory status remains stable





This is a 30-year-old status post trach and PEG due to developmental delay, 

patient has a history of chronic seizure disorder, patient's PEG tube has been 

recently changed about 2 months ago developed problems associated with severe 

constipation and vomiting and bluish blood-tinged secretions coming out through 

the PEG tube came into the hospital for further evaluation suspicion of 

pneumonia, chest x-ray revealed bilateral lower lobe infiltrate consistent with 

aspiration pneumonia patient is currently getting broad-spectrum antibiotics 

finally he has a bowel movement is abdomen is more softer now, patient also has 

a computed tomography scan of the abdominal pelvis which revealed dilated loops 

of bowel and colon








Objective





- Vital Signs


Vital signs: 


                                   Vital Signs











Temp  97.5 F L  03/18/20 07:00


 


Pulse  70   03/18/20 07:29


 


Resp  15   03/18/20 07:00


 


BP  94/55   03/18/20 07:00


 


Pulse Ox  99   03/18/20 07:00








                                 Intake & Output











 03/17/20 03/18/20 03/18/20





 18:59 06:59 18:59


 


Intake Total 600  


 


Output Total 420 825 


 


Balance 180 -825 


 


Intake:   


 


  Intake, IV Titration 400  





  Amount   


 


    Lacosamide  mg In 50  





    Sodium Chloride 0.9% 50   





    ml @ 100 mls/hr IVPB BID   





    OLEG Rx#:[id number]   


 


    Sodium Chloride 0.9% 1, 250  





    000 ml @ 50 mls/hr IV .   





    Q20H [facility] Rx#:[id number]   


 


    metroNIDAZOLE-NS  100  





    mg In Saline 1 100ml.bag   





    @ 100 mls/hr IVPB Q8HR   





    OLEG Rx#:[id number]   


 


  Other 200  


 


Output:   


 


  Urine 420 825 


 


Other:   


 


  Voiding Method Indwelling Catheter Indwelling Catheter 


 


  # Voids 1  


 


  # Bowel Movements  1 














- Exam


- Constitutional


General appearance: average body habitus, disheveled, no acute distress





- EENT


Eyes: EOMI, PERRLA


Ears: bilateral: normal





- Neck


Neck: normal ROM


Carotids: bilateral: upstroke normal





- Respiratory


Respiratory: bilateral: CTA, diminished, negative: dullness, rales, rhonchi





- Cardiovascular


Rhythm: regular


Heart sounds: normal: S1, S2





- Gastrointestinal


General gastrointestinal: decreased bowel sounds





- Musculoskeletal


Musculoskeletal: generalized weakness, strength equal bilaterally











- Labs


CBC & Chem 7: 


                                 03/18/20 06:40





                                 03/18/20 06:40


Labs: 


                  Abnormal Lab Results - Last 24 Hours (Table)











  03/18/20 03/18/20 Range/Units





  06:40 06:40 


 


RBC  3.43 L   (4.30-5.90)  m/uL


 


Hgb  11.2 L   (13.0-17.5)  gm/dL


 


Hct  33.6 L   (39.0-53.0)  %


 


RDW  15.7 H   (11.5-15.5)  %


 


Potassium   2.7 L*  (3.5-5.1)  mmol/L


 


BUN   8 L  (9-20)  mg/dL


 


Calcium   8.2 L  (8.4-10.2)  mg/dL








                      Microbiology - Last 24 Hours (Table)











 03/15/20 12:24 Blood Culture - Preliminary





 Blood    No Growth after 48 hours


 


 03/15/20 22:00 Urine Culture - Preliminary





 Urine,Catheterized    Group D Enterococcus














Assessment and Plan


Assessment: 





Acute hypoxic respiratory failure





Bilateral aspiration pneumonia





Bowel obstruction likely due to retained fecal material





Developmentally delayed





Status post chronic Peg tube and tracheostomy





Severe degree of seizure disorder





Past history of respiratory failure requiring vent support


Plan: 





Maintain trach keep saturation over 92% supplemental oxygen as needed





Pulmonary toilet





Respiratory to suction as needed





Seizure precautions





Nothing by mouth for now





Hold tube feed





Broad-spectrum antibiotics for bilateral aspiration pneumonia





Further recommendations pending plan of care as per clinical response of the 

patient








Time with Patient: Greater than 30

## 2020-03-18 NOTE — PN
PROGRESS NOTE



DATE OF SERVICE:

03/18/2020



REASON FOR FOLLOWUP:

1. Aspiration pneumonitis.

2. UTI.



INTERVAL HISTORY:

The patient is currently afebrile, has been breathing comfortably.  The patient

continues to have problems with constipation, but no vomiting has been reported.
 CT of

abdomen and pelvis has been done which shows evidence of colonic ileus and  the

patient unable to provide any history.



PHYSICAL EXAMINATION:

Blood pressure 94/55 with a pulse of 69, temperature 97.5. He is 99% on room 
air.

General description is a middle-aged male lying in bed in no distress.

RESPIRATORY SYSTEM: Unlabored breathing with decreased breath sounds at the 
base. No

wheeze.

HEART: S1, S2.  Regular rate and rhythm.

ABDOMEN: Soft. No tenderness.

EXTREMITIES: No edema of the feet.



LABS:

Hemoglobin is 11.1, white count of 5.5 with a BUN of 8, creatinine 0.66. 
Potassium low

at 2.7.  Urine was positive. Urine culture now showing Enterococcus.



DIAGNOSTIC IMPRESSION AND PLAN:

1. Patient admitted to hospital with significant vomiting secondary to ileus 
with

    concern for possible aspiration pneumonitis.  The patient is covered with 
Levaquin

    and Flagyl; to continue.

2. Patient with a positive urinalysis. CT has been suggestive of cystitis.  
Urine with

    Enterococcus.  Patient with PENICILLIN ALLERGY.  Will add vancomycin and 
monitor

    his kidney function and clinical course closely.





MMODL / IJN: [id number] / Job#: [id number]

MTDD

## 2020-03-18 NOTE — P.PN
Subjective





This is a pleasant 30 years old male with past medical history of developmental 

delay, autism, seizure disorder, status post vagal neurostimulator, left 

ureteral stent, presents this signs symptoms of bowel obstruction secondary to 

fecal impaction, surgeries been evaluated the patient GoLYTELY and has been 

tried with no much success and enema with only moderate stool, and surgery team 

recommended CAT scan of the abdomen and pelvis today.


Showing fecal impaction with possible small bowel dilatation suggestive of 

colonic ileus, possible cystitis


Patient is nothing by mouth and hold tube feeds, hemodynamically stable, blood 

pressure is 94/55, which is similar to last few days on the presentation.  Low 

potassium is been replaced, WBC is normal 5.5K, urine cultures, group D 

enterococcus














Objective





- Vital Signs


Vital signs: 


                                   Vital Signs











Temp  97.5 F L  03/18/20 07:00


 


Pulse  70   03/18/20 07:29


 


Resp  15   03/18/20 08:06


 


BP  94/55   03/18/20 07:00


 


Pulse Ox  99   03/18/20 07:00








                                 Intake & Output











 03/17/20 03/18/20 03/18/20





 18:59 06:59 18:59


 


Intake Total 600  


 


Output Total 420 825 


 


Balance 180 -825 


 


Weight   74.843 kg


 


Intake:   


 


  Intake, IV Titration 400  





  Amount   


 


    Lacosamide  mg In 50  





    Sodium Chloride 0.9% 50   





    ml @ 100 mls/hr IVPB BID   





    OLEG Rx#:[id number]   


 


    Sodium Chloride 0.9% 1, 250  





    000 ml @ 50 mls/hr IV .   





    Q20H [facility] Rx#:[id number]   


 


    metroNIDAZOLE-NS  100  





    mg In Saline 1 100ml.bag   





    @ 100 mls/hr IVPB Q8HR   





    OLEG Rx#:[id number]   


 


  Other 200  


 


Output:   


 


  Urine 420 825 


 


Other:   


 


  Voiding Method Indwelling Catheter Indwelling Catheter Indwelling Catheter


 


  # Voids 1  


 


  # Bowel Movements  1 














- Exam





-GENERAL: Average for age, no respiratory distress


HEENT: Pupils are round and equally reacting to light. EOMI. No scleral icterus.

No conjunctival pallor. Normocephalic, atraumatic. No pharyngeal erythema. No 

thyromegaly. 


CARDIOVASCULAR: S1 and S2 present. No murmurs, rubs, or gallops. 


PULMONARY: Chest is clear to auscultation, no wheezing or crackles. 


-ABDOMEN: Soft, nontender, nondistended, normoactive bowel sounds. No palpable 

organomegaly.  PEG tube is in a Place


MUSCULOSKELETAL: No joint swelling or deformity. 


EXTREMITIES: No cyanosis, clubbing, or pedal edema. 


NEUROLOGICAL: Gross neurological examination did not reveal any focal deficits. 


SKIN: No rashes. no petechiae.





- Labs


CBC & Chem 7: 


                                 03/18/20 06:40





                                 03/18/20 06:40


Labs: 


                  Abnormal Lab Results - Last 24 Hours (Table)











  03/18/20 03/18/20 Range/Units





  06:40 06:40 


 


RBC  3.43 L   (4.30-5.90)  m/uL


 


Hgb  11.2 L   (13.0-17.5)  gm/dL


 


Hct  33.6 L   (39.0-53.0)  %


 


RDW  15.7 H   (11.5-15.5)  %


 


Potassium   2.7 L*  (3.5-5.1)  mmol/L


 


BUN   8 L  (9-20)  mg/dL


 


Calcium   8.2 L  (8.4-10.2)  mg/dL








                      Microbiology - Last 24 Hours (Table)











 03/15/20 12:24 Blood Culture - Preliminary





 Blood    No Growth after 48 hours


 


 03/15/20 22:00 Urine Culture - Preliminary





 Urine,Catheterized    Group D Enterococcus














Assessment and Plan


Assessment: 





Bowel obstruction secondary to fecal impaction


Possible aspiration pneumonia


Possible upper GI bleed


Suspicion for acute urinary tract infection and acute cystitis


Developmental delay status post Todd and tracheostomy


History of seizure





Plan: 








This is a pleasant 30 years old male who presents with bowel obstruction, 

possible aspiration pneumonia and UTI and GI bleed.  Patient is followed by 

[facility] consult including surgery, gastroenterology, and infectious disease.  

Patient currently on antibiotics as per ID recommendation.  Continue with gentle

hydration.  Follow-up culture results


Labs and medication were reviewed..  Continue same treatment.  Continue with 

symptomatic treatment.  Resume home medication.  Monitor lytes and vitals.  DVT 

and GI prophylaxis.  Further recommendations of the clinical course of the 

patient


DVT prophylaxis: No heparin in view of possible GI bleed


GI Prophylaxis: Pepcid


PT/OT: Pending


Prognosis is guarded

## 2020-03-18 NOTE — CT
EXAMINATION TYPE: CT abdomen pelvis wo con

 

DATE OF EXAM: 3/18/2020

 

HISTORY: colonic ileus

 

CT DLP: 767.9 mGycm.  Automated Exposure Control for Dose Reduction was Utilized.

 

TECHNIQUE:  CT scan of the abdomen and pelvis is performed with oral but without IV contrast. Oral co
ntrast was presumed given through PEG tube.

 

COMPARISON: CT 3 days earlier

 

FINDINGS:  Within the limitations of a non-contrast study, the following observations are made. Exam 
noted suboptimal as patient has virtual no intra-abdominal fat.

 

LUNG BASES: Motion artifact degradation makes evaluation suboptimal. New small to tiny right greater 
than left pleural effusions. Persistent posterior atelectasis and/or consolidation.

 

LIVER/GB: No significant abnormality is appreciated.

 

PANCREAS: No significant abnormality is seen.

 

SPLEEN: No significant abnormality is seen.

 

ADRENALS: No significant abnormality is seen.

 

KIDNEYS: Similar to prior study severe left-sided hydronephrosis and moderate right-sided hydronephro
sis. [place] catheter within bladder which shows some decompression versus prior. There is moderate con
centric wall thickening noted. Cannot exclude underlying acute cystitis. Correlate clinically.

 

BOWEL: Redemonstration of moderate size hiatal hernia. PEG tube remains in place. Oral contrast reach
es level of the cecum making evaluation of distal bowel suboptimal. There is no significant small bow
el dilatation. There is diffuse prominence of colonic loops along the periphery filled with fluid and
 fecal material. Prominent dilatation involves somewhat redundant sigmoid colon similar to prior.

 

GENITAL ORGANS: No gross abnormality seen.

 

LYMPH NODES: No greater than 1cm abdominal or pelvic lymph nodes are appreciated.

 

OSSEOUS STRUCTURES: No significant abnormality is seen.

 

OTHER: No significant additional abnormality is seen.

 

IMPRESSION: Suboptimal study. Persistent severe fluid and fecal filled colon especially distally with
out suspicious small bowel dilatation suggests severe colonic ileus. Possible underlying acute cystit
is.

## 2020-03-18 NOTE — P.PN
Subjective


Progress Note Date: 03/18/20





CHIEF COMPLAINT: bowel obstruction





HISTORY OF PRESENT ILLNESS: Patient examined at the bedside. Caregiver present. 

Patient received 2L GoLYTELY yesterday and a soap suds enema.  Nursing reports 

he had a small bowel movement this morning





PHYSICAL EXAM: 


VITAL SIGNS: Reviewed.


GENERAL: Well-developed in no acute distress. 


HEENT:  Trach noted. No sclera icterus. Extraocular movements grossly intact.  

Moist buccal mucosa. Head is atraumatic, normocephalic. 


ABDOMEN:  Soft.  Distended. Nontender. PEG tube noted.


NEUROLOGIC: Nonverbal at baseline





ASSESSMENT: 


1.  Vomiting


2.  Megacolon, colonic ileus


3.  History of trach and PEG





PLAN: 


Obtain CT abdomen pelvis with oral contrast (via peg) to evaluate status of 

colonic ileus


Further recommendations pending CT results





Nurse practitioner note has been reviewed by physician. Signing provider agrees 

with the documented findings, assessment, and plan of care. 








Objective





- Vital Signs


Vital signs: 


                                   Vital Signs











Temp  97.5 F L  03/18/20 07:00


 


Pulse  70   03/18/20 07:29


 


Resp  15   03/18/20 07:00


 


BP  94/55   03/18/20 07:00


 


Pulse Ox  99   03/18/20 07:00








                                 Intake & Output











 03/17/20 03/18/20 03/18/20





 18:59 06:59 18:59


 


Intake Total 600  


 


Output Total 420 825 


 


Balance 180 -825 


 


Intake:   


 


  Intake, IV Titration 400  





  Amount   


 


    Lacosamide  mg In 50  





    Sodium Chloride 0.9% 50   





    ml @ 100 mls/hr IVPB BID   





    OLEG Rx#:[id number]   


 


    Sodium Chloride 0.9% 1, 250  





    000 ml @ 50 mls/hr IV .   





    Q20H [facility] Rx#:[id number]   


 


    metroNIDAZOLE-NS  100  





    mg In Saline 1 100ml.bag   





    @ 100 mls/hr IVPB Q8HR   





    OLEG Rx#:[id number]   


 


  Other 200  


 


Output:   


 


  Urine 420 825 


 


Other:   


 


  Voiding Method Indwelling Catheter Indwelling Catheter 


 


  # Voids 1  


 


  # Bowel Movements  1 














- Labs


CBC & Chem 7: 


                                 03/18/20 06:40





                                 03/18/20 06:40


Labs: 


                  Abnormal Lab Results - Last 24 Hours (Table)











  03/18/20 03/18/20 Range/Units





  06:40 06:40 


 


RBC  3.43 L   (4.30-5.90)  m/uL


 


Hgb  11.2 L   (13.0-17.5)  gm/dL


 


Hct  33.6 L   (39.0-53.0)  %


 


RDW  15.7 H   (11.5-15.5)  %


 


Potassium   2.7 L*  (3.5-5.1)  mmol/L


 


BUN   8 L  (9-20)  mg/dL


 


Calcium   8.2 L  (8.4-10.2)  mg/dL








                      Microbiology - Last 24 Hours (Table)











 03/15/20 12:24 Blood Culture - Preliminary





 Blood    No Growth after 48 hours


 


 03/15/20 22:00 Urine Culture - Preliminary





 Urine,Catheterized    Group D Enterococcus

## 2020-03-18 NOTE — PN
PROGRESS NOTE



DATE OF DICTATION:

03/18/2020



The patient is a 30-year-old pleasant white male admitted to the hospital with coffee-

ground emesis and severe constipation. He was given enemas with some help yesterday was

given GoLYTELY with no relief.  He also had another enema yesterday morning and did not

have any bowel movements. As per the patient's mother who is taking care of him, he

still has severe constipation.  CT of the abdomen and pelvis was ordered by surgical

services which once again showed a moderate-size hiatal hernia, PEG tube in place.

There is no bowel obstruction, but there was diffuse prominence of the colonic loops

with fecal material in the left colon. The patient is nonverbal.



PHYSICAL EXAMINATION:

On physical examination, sleeping, no acute distress.

Vital signs are stable.  Blood pressure 94/55, pulse rate 69, temperature 97.5.

HEENT: Examination unremarkable. Conjunctivae pink. Sclerae anicteric.

NECK: No JVD or lymph node enlargement.

CHEST: Clear to auscultation.

HEART:  Regular rate and rhythm.

ABDOMEN: PEG tube in place but it appeared very soft, nontender.  Bowel sounds are

positive.

EXTREMITIES: No pedal edema.  He has extremity contractures noted.



LABS:

WBC 5.5, hemoglobin 11.2, platelets normal.  Basic metabolic panel shows a potassium is

2.7, but otherwise unremarkable.



IMPRESSION:

1. Severe constipation, status post enema and GoLYTELY prep yesterday with no

    significant relief.  CT scan done this morning once again showed fecal stasis with

    dilated colonic loops.

2. Coffee-grounds emesis, resolved.

3. History of PEG tube placement 2 years ago. Feeds are on hold.

4. Mild anemia.

5. Mild hypokalemia.



RECOMMENDATION:

1. At this time, recommend continuing with MiraLAX 1 scoop twice daily as well as a

    Fleet enema every day or every other day based on the symptoms.

2. Await recommendations from Surgery.

3. Will sign off at this time and he can follow up in the office following discharge

    from the hospital for continued management of GI problems.

Thank you for this consultation.





MMODL / IJN: [id number] / Job#: [id number]

## 2020-03-19 LAB
ANION GAP SERPL CALC-SCNC: 9 MMOL/L
BUN SERPL-SCNC: 5 MG/DL (ref 9–20)
CALCIUM SPEC-MCNC: 8.7 MG/DL (ref 8.4–10.2)
CHLORIDE SERPL-SCNC: 109 MMOL/L (ref 98–107)
CO2 SERPL-SCNC: 22 MMOL/L (ref 22–30)
GLUCOSE SERPL-MCNC: 80 MG/DL (ref 74–99)
MAGNESIUM SPEC-SCNC: 1.7 MG/DL (ref 1.6–2.3)
POTASSIUM SERPL-SCNC: 3.4 MMOL/L (ref 3.5–5.1)
SODIUM SERPL-SCNC: 140 MMOL/L (ref 137–145)

## 2020-03-19 RX ADMIN — LEVETIRACETAM SCH MG: 100 SOLUTION ORAL at 09:43

## 2020-03-19 RX ADMIN — METOCLOPRAMIDE SCH MG: 5 INJECTION, SOLUTION INTRAMUSCULAR; INTRAVENOUS at 12:31

## 2020-03-19 RX ADMIN — SODIUM CHLORIDE SCH MLS/HR: 9 INJECTION, SOLUTION INTRAVENOUS at 05:17

## 2020-03-19 RX ADMIN — METOCLOPRAMIDE SCH MG: 5 INJECTION, SOLUTION INTRAMUSCULAR; INTRAVENOUS at 00:27

## 2020-03-19 RX ADMIN — ASPIRIN SCH MG: 325 TABLET ORAL at 12:31

## 2020-03-19 RX ADMIN — LEVETIRACETAM SCH MG: 100 SOLUTION ORAL at 00:51

## 2020-03-19 RX ADMIN — LEVETIRACETAM SCH MG: 100 SOLUTION ORAL at 17:00

## 2020-03-19 RX ADMIN — CEFAZOLIN SCH MLS/HR: 330 INJECTION, POWDER, FOR SOLUTION INTRAMUSCULAR; INTRAVENOUS at 05:17

## 2020-03-19 RX ADMIN — METOCLOPRAMIDE SCH MG: 5 INJECTION, SOLUTION INTRAMUSCULAR; INTRAVENOUS at 23:32

## 2020-03-19 RX ADMIN — METRONIDAZOLE SCH MLS/HR: 500 INJECTION, SOLUTION INTRAVENOUS at 00:51

## 2020-03-19 RX ADMIN — HEPARIN SODIUM SCH UNIT: 5000 INJECTION, SOLUTION INTRAVENOUS; SUBCUTANEOUS at 09:36

## 2020-03-19 RX ADMIN — METRONIDAZOLE SCH MLS/HR: 500 INJECTION, SOLUTION INTRAVENOUS at 23:34

## 2020-03-19 RX ADMIN — LACOSAMIDE SCH MLS/HR: 10 INJECTION INTRAVENOUS at 22:32

## 2020-03-19 RX ADMIN — METOCLOPRAMIDE SCH MG: 5 INJECTION, SOLUTION INTRAMUSCULAR; INTRAVENOUS at 05:17

## 2020-03-19 RX ADMIN — FOLIC ACID SCH MG: 1 TABLET ORAL at 09:38

## 2020-03-19 RX ADMIN — METRONIDAZOLE SCH MLS/HR: 500 INJECTION, SOLUTION INTRAVENOUS at 17:00

## 2020-03-19 RX ADMIN — LEVOFLOXACIN SCH MLS/HR: 750 INJECTION, SOLUTION INTRAVENOUS at 17:01

## 2020-03-19 RX ADMIN — CEFAZOLIN SCH: 330 INJECTION, POWDER, FOR SOLUTION INTRAMUSCULAR; INTRAVENOUS at 16:07

## 2020-03-19 RX ADMIN — LACTOBACILLUS ACIDOPHILUS / LACTOBACILLUS BULGARICUS SCH EACH: 100 MILLION CFU STRENGTH GRANULES at 09:37

## 2020-03-19 RX ADMIN — TAMSULOSIN HYDROCHLORIDE SCH MG: 0.4 CAPSULE ORAL at 09:38

## 2020-03-19 RX ADMIN — IPRATROPIUM BROMIDE AND ALBUTEROL SULFATE SCH: .5; 3 SOLUTION RESPIRATORY (INHALATION) at 12:11

## 2020-03-19 RX ADMIN — METRONIDAZOLE SCH MLS/HR: 500 INJECTION, SOLUTION INTRAVENOUS at 09:39

## 2020-03-19 RX ADMIN — ASPIRIN SCH MG: 325 TABLET ORAL at 00:51

## 2020-03-19 RX ADMIN — LACOSAMIDE SCH MLS/HR: 10 INJECTION INTRAVENOUS at 11:02

## 2020-03-19 RX ADMIN — IPRATROPIUM BROMIDE AND ALBUTEROL SULFATE SCH ML: .5; 3 SOLUTION RESPIRATORY (INHALATION) at 16:19

## 2020-03-19 RX ADMIN — SODIUM CHLORIDE SCH: 9 INJECTION, SOLUTION INTRAVENOUS at 21:08

## 2020-03-19 RX ADMIN — PANTOPRAZOLE SODIUM SCH MG: 40 INJECTION, POWDER, FOR SOLUTION INTRAVENOUS at 21:04

## 2020-03-19 RX ADMIN — IPRATROPIUM BROMIDE AND ALBUTEROL SULFATE SCH ML: .5; 3 SOLUTION RESPIRATORY (INHALATION) at 07:55

## 2020-03-19 RX ADMIN — IPRATROPIUM BROMIDE AND ALBUTEROL SULFATE SCH ML: .5; 3 SOLUTION RESPIRATORY (INHALATION) at 19:02

## 2020-03-19 RX ADMIN — POTASSIUM BICARBONATE SCH MEQ: 782 TABLET, EFFERVESCENT ORAL at 00:54

## 2020-03-19 RX ADMIN — ASPIRIN SCH MG: 325 TABLET ORAL at 23:33

## 2020-03-19 RX ADMIN — METOCLOPRAMIDE SCH MG: 5 INJECTION, SOLUTION INTRAMUSCULAR; INTRAVENOUS at 17:00

## 2020-03-19 RX ADMIN — HEPARIN SODIUM SCH UNIT: 5000 INJECTION, SOLUTION INTRAVENOUS; SUBCUTANEOUS at 21:04

## 2020-03-19 RX ADMIN — PANTOPRAZOLE SODIUM SCH MG: 40 INJECTION, POWDER, FOR SOLUTION INTRAVENOUS at 09:38

## 2020-03-19 RX ADMIN — HYDROMORPHONE HYDROCHLORIDE PRN MG: 1 INJECTION, SOLUTION INTRAMUSCULAR; INTRAVENOUS; SUBCUTANEOUS at 09:47

## 2020-03-19 NOTE — P.PN
Subjective


Progress Note Date: 03/19/20





CHIEF COMPLAINT: bowel obstruction





HISTORY OF PRESENT ILLNESS: Patient examined at the bedside. Caregiver present. 

Nursing reports patient had a few small loose bowel movements overnight. 





PHYSICAL EXAM: 


VITAL SIGNS: Reviewed.


GENERAL: Well-developed in no acute distress. 


HEENT:  Trach noted. No sclera icterus. Extraocular movements grossly intact.  

Moist buccal mucosa. Head is atraumatic, normocephalic. 


ABDOMEN:  Soft.  Distended. Nontender. PEG tube noted.


NEUROLOGIC: Nonverbal at baseline





ASSESSMENT: 


1.  Vomiting


2.  Megacolon, colonic ileus


3.  History of trach and PEG





PLAN: 


Obtain CT abdomen pelvis with rectal contrast


Further recommendations pending CT results





Nurse practitioner note has been reviewed by physician. Signing provider agrees 

with the documented findings, assessment, and plan of care. 








Objective





- Vital Signs


Vital signs: 


                                   Vital Signs











Temp  97.9 F   03/19/20 07:00


 


Pulse  70   03/19/20 08:13


 


Resp  16   03/19/20 07:00


 


BP  101/61   03/19/20 07:00


 


Pulse Ox  95   03/19/20 07:00








                                 Intake & Output











 03/18/20 03/19/20 03/19/20





 18:59 06:59 18:59


 


Intake Total 725  


 


Output Total 1800 1300 


 


Balance -1075 -1300 


 


Weight 74.843 kg  


 


Intake:   


 


  Intake, IV Titration 725  





  Amount   


 


    Lacosamide  mg In 50  





    Sodium Chloride 0.9% 50   





    ml @ 100 mls/hr IVPB BID   





    OLEG Rx#:[id number]   


 


    Levofloxacin 750Mg-D5w 150  





    Pmx 750 mg In Dextrose/   





    Water 1 150ml.bag @ 100   





    mls/hr IVPB Q24H [facility] Rx#:   





    [id number]   


 


    Sodium Chloride 0.9% 1, 425  





    000 ml @ 100 mls/hr IV .   





    Q10H [facility] Rx#:[id number]   


 


    metroNIDAZOLE-NS  100  





    mg In Saline 1 100ml.bag   





    @ 100 mls/hr IVPB Q8HR   





    OLEG Rx#:[id number]   


 


Output:   


 


  Urine 1800 1300 


 


Other:   


 


  Voiding Method Indwelling Catheter Indwelling Catheter 














- Labs


CBC & Chem 7: 


                                 03/18/20 06:40





                                 03/19/20 06:46


Labs: 


                  Abnormal Lab Results - Last 24 Hours (Table)











  03/18/20 03/19/20 Range/Units





  20:18 06:46 


 


Potassium  3.2 L  3.4 L  (3.5-5.1)  mmol/L


 


Chloride   109 H  ()  mmol/L


 


BUN   5 L  (9-20)  mg/dL


 


Creatinine   0.61 L  (0.66-1.25)  mg/dL








                      Microbiology - Last 24 Hours (Table)











 03/15/20 22:00 Urine Culture - Final





 Urine,Catheterized    Enterococcus faecalis VRE


 


 03/15/20 12:24 Blood Culture - Preliminary





 Blood    No Growth after 72 hours

## 2020-03-19 NOTE — P.PN
Subjective


Progress Note Date: 03/19/20


Principal diagnosis: 


Acute hypoxic respiratory failure





Bilateral aspiration pneumonia





Bowel obstruction likely due to retained fecal material





Developmentally delayed





Status post chronic Peg tube and tracheostomy





Severe degree of seizure disorder





Past history of respiratory failure requiring vent support





03/19/2020, patient seen eval examined during the rounds labs reviewed 

medications reviewed, as per request of the family trach has been evaluated, 

care plan discussed with the respiratory therapist as well, tracheostomy stoma 

appears to be stable inner cannula clean, no evidence of infection, no need to 

change tracheostomy





03/18/2020, patient seen and evaluated examined during the rounds overall 

remains stable pulmonary standpoint remains on antibiotics for aspiration 

pneumonia congestion is present, computed tomography scan of the abdomen 

revealed a large hiatal hernia, PEG tube has been stable,





03/17/2020, patient seen and evaluated examined overall no significant change 

and no cough or congestion is present no more episodes of vomiting has been 

noted patient is getting therapy for pneumonia ID service has been following 

respiratory status remains stable





This is a 30-year-old status post trach and PEG due to developmental delay, 

patient has a history of chronic seizure disorder, patient's PEG tube has been 

recently changed about 2 months ago developed problems associated with severe 

constipation and vomiting and bluish blood-tinged secretions coming out through 

the PEG tube came into the hospital for further evaluation suspicion of 

pneumonia, chest x-ray revealed bilateral lower lobe infiltrate consistent with 

aspiration pneumonia patient is currently getting broad-spectrum antibiotics 

finally he has a bowel movement is abdomen is more softer now, patient also has 

a computed tomography scan of the abdominal pelvis which revealed dilated loops 

of bowel and colon








Objective





- Vital Signs


Vital signs: 


                                   Vital Signs











Temp  97.9 F   03/19/20 07:00


 


Pulse  70   03/19/20 08:13


 


Resp  16   03/19/20 07:00


 


BP  101/61   03/19/20 07:00


 


Pulse Ox  95   03/19/20 07:00








                                 Intake & Output











 03/18/20 03/19/20 03/19/20





 18:59 06:59 18:59


 


Intake Total 725  


 


Output Total 1800 1300 500


 


Balance -1075 -1300 -500


 


Weight 74.843 kg  


 


Intake:   


 


  Intake, IV Titration 725  





  Amount   


 


    Lacosamide  mg In 50  





    Sodium Chloride 0.9% 50   





    ml @ 100 mls/hr IVPB BID   





    [facility] Rx#:[id number]   


 


    Levofloxacin 750Mg-D5w 150  





    Pmx 750 mg In Dextrose/   





    Water 1 150ml.bag @ 100   





    mls/hr IVPB Q24H [facility] Rx#:   





    [id number]   


 


    Sodium Chloride 0.9% 1, 425  





    000 ml @ 100 mls/hr IV .   





    Q10H [facility] Rx#:[id number]   


 


    metroNIDAZOLE-NS  100  





    mg In Saline 1 100ml.bag   





    @ 100 mls/hr IVPB Q8HR   





    [facility] Rx#:[id number]   


 


Output:   


 


  Urine 1800 1300 500


 


    Uretheral ([place])   500


 


Other:   


 


  Voiding Method Indwelling Catheter Indwelling Catheter 














- Exam


- Constitutional


General appearance: average body habitus, disheveled, no acute distress





- EENT


Eyes: EOMI, PERRLA


Ears: bilateral: normal





- Neck


Neck: normal ROM


Carotids: bilateral: upstroke normal





- Respiratory


Respiratory: bilateral: CTA, diminished, negative: dullness, rales, rhonchi





- Cardiovascular


Rhythm: regular


Heart sounds: normal: S1, S2





- Gastrointestinal


General gastrointestinal: decreased bowel sounds





- Musculoskeletal


Musculoskeletal: generalized weakness, strength equal bilaterally











- Labs


CBC & Chem 7: 


                                 03/18/20 06:40





                                 03/19/20 06:46


Labs: 


                  Abnormal Lab Results - Last 24 Hours (Table)











  03/18/20 03/19/20 Range/Units





  20:18 06:46 


 


Potassium  3.2 L  3.4 L  (3.5-5.1)  mmol/L


 


Chloride   109 H  ()  mmol/L


 


BUN   5 L  (9-20)  mg/dL


 


Creatinine   0.61 L  (0.66-1.25)  mg/dL








                      Microbiology - Last 24 Hours (Table)











 03/15/20 22:00 Urine Culture - Final





 Urine,Catheterized    Enterococcus faecalis VRE


 


 03/15/20 12:24 Blood Culture - Preliminary





 Blood    No Growth after 72 hours














Assessment and Plan


Assessment: 





Acute hypoxic respiratory failure





Bilateral aspiration pneumonia





Bowel obstruction likely due to retained fecal material





Developmentally delayed





Status post chronic Peg tube and tracheostomy





Severe degree of seizure disorder





Past history of respiratory failure requiring vent support


Plan: 





Maintain trach keep saturation over 92% supplemental oxygen as needed





Pulmonary toilet





Respiratory to suction as needed





Seizure precautions





Nothing by mouth for now





Hold tube feed





Broad-spectrum antibiotics for bilateral aspiration pneumonia





Further recommendations pending plan of care as per clinical response of the 

patient








Time with Patient: Greater than 30

## 2020-03-19 NOTE — CT
EXAMINATION TYPE: CT abdomen pelvis wo con

 

DATE OF EXAM: 3/19/2020

 

COMPARISON: CT abdomen pelvis dated 3/18/2020

 

HISTORY: bowel obstruction

 

CT DLP: 593.7 mGycm

Automated exposure control for dose reduction was used.

 

TECHNIQUE:  Helical acquisition of images was performed from the lung bases through the pelvis.

 

FINDINGS: 

 

LUNG BASES: Similar trace right pleural effusion and bibasilar airspace disease.

 

LIVER/GB: Unremarkable unenhanced morphology. No cholelithiasis seen.

 

PANCREAS: No significant abnormality is seen.

 

SPLEEN: No splenomegaly seen.

 

ADRENALS: No discrete nodule.

 

KIDNEYS: Severe left-sided hydronephrosis and surgical clips seen near the left ureteropelvic junctio
n. Moderate right hydronephrosis. Nonobstructing 3 mm right upper pole renal calculus is seen. Urinar
y bladder again displays circumferential wall thickening with air likely introduced from the [place] ca
theter.

 

FREE AIR:  No free air is visualized

 

ADENOPATHY:  Nondiagnostic given the lack of intravenous contrast and paucity of bowel gas.

 

OSSEOUS STRUCTURES:  No significant abnormality is seen.

 

BOWEL:  Rectal contrast oral contrast extends to the entirety of the large bowel. Air-fluid levels ar
e seen. Bowel measures up to 6.0 cm, upper limits of normal. In the redundant sigmoid colon there is 
a focal area of narrowing seen to the right of midline on image 78 of the axiale plane and image 43 o
f the coronal plane. This could relate to colonic spasm or incomplete stricture. Less likely mucosal 
neoplasm in a patient of this age. Positive intra-abdominal fat and lack of contrast limit evaluation
 of the small bowel. There is a percutaneous enteric gastric tube present. Small to moderate hiatal h
ernia.

 

IMPRESSION: 

 

1. NO EVIDENCE OF OBSTRUCTION AS RECTAL CONTRAST EXTENDS THROUGH THE ENTIRETY OF THE NONDILATED COLON
. HOWEVER THERE IS NARROWING OF THE REDUNDANT SIGMOID COLON RIGHT PARA MIDLINE MARKED ON THE IMAGES T
HAT COULD RELATE TO STRICTURE, SPASM, OR LESS LIKELY NEOPLASM IN A PATIENT OF THIS AGE.

2. REDEMONSTRATION OF SEVERE LEFT AND MODERATE RIGHT HYDRONEPHROSIS.

3. AGAIN THE URINARY BLADDER DISPLAYS CIRCUMFERENTIAL THICKENING. CORRELATE WITH URINALYSIS.

## 2020-03-19 NOTE — CDI
Documentation Clarification Form



Date: 03/19/2020 08:53:33 AM

From: Rocio Vaca RN CCDS

Phone: [phone]

MRN: [id number]

Admit Date: 03/15/2020 03:51:00 PM

Patient Name: Raj Silva

Visit Number: [id number]

Discharge Date:  



ATTENTION: The Clinical Documentation Specialists (CDI) and [facility] Coding Staff 
appreciate your assistance in clarifying documentation. Please respond to the 
clarification below the line at the bottom and electronically sign. The CDI & 
[facility] Coding staff will review the response and follow-up if needed. Please note: 
Queries are made part of the Legal Health Record. If you have any questions, 
please contact the author of this message via ITS.



Dr. Mccollum E Sheet



The diagnosis possible sepsis was documented in the H&P and in Progress notes 
3/16, 3/17 by Dr. Prater but is not noted in subsequent documentation.



History/Risk Factors: 30 -year-old male presets to the ED with vomiting black 
colored coffee material and black stools coming out of the Peg Tube for the last
two days. 

Clinical Indicators: 

Per ID Progress Note 3/18 Patient admitted to hospital with significant 
vomiting secondary to ileus with concern for possible aspiration pneumonitis. 
Patient with a positive urinalysis. Ct has been suggestive of cystitis. 
Urine with Enterococcus.  

VSS 3/15 154/94 68 97.8 18 97% ra 

Labs 3/15 Wbc 16.4, Neutrophils 15.0, K 3.2, Alk Phos 129, Gastric occult blood 
positive, Stool occult blood positive 3/17 UA - Culture Group D Enterococcus

3/15 CXR Bilateral lower lobe infiltrate

Treatment:  3/15 0.9ns1L bolus x1, 0.9ns 100cc/hr 3/15 Clindamycin Ivpb x1, 
Levaquin Ivp Q24Hr Jg, 3/16 Metronidazole Ivpb Q8Hr jg, 

3/18 Vancomycin Ivpb Q 8H jg, 



Please clarify if the Sepsis was



   Present/active this admission

   Treated and resolved this admission

   Ruled out

   Other, please specify _______________

   Clinically unable to determine









(Last Query Form Revision: September 2019)



A: during my evaluation i do not think pt has sepsis 

MTDD

## 2020-03-19 NOTE — P.PN
Subjective





This is a pleasant 30 years old male with past medical history of developmental 

delay, autism, seizure disorder, status post vagal neurostimulator, left 

ureteral stent, presents this signs symptoms of bowel obstruction secondary to 

fecal impaction, surgeries been evaluated the patient GoLYTELY and has been 

tried with no much success and enema with only moderate stool, and surgery team 

recommended CAT scan of the abdomen and pelvis today.


Showing fecal impaction with possible small bowel dilatation suggestive of 

colonic ileus, possible cystitis


Patient is nothing by mouth and hold tube feeds, hemodynamically stable, blood 

pressure is 94/55, which is similar to last few days on the presentation.  Low 

potassium is been replaced, WBC is normal 5.5K, urine cultures, group D 

enterococcus





03/19/2020


Patient is lying comfortable in bed, no distress, mother at bedside, patient 

didn't have bowel movement since yesterday, no abdominal tenderness or 

significant pain, the mother does not think he needs abdominal pain medication 

for now.  He got several laxative for his fecal impaction contributing to his 

bowel obstruction/ileus.  His hemoglobin is stable at 11.2.  No other episodes 

of GI bleed.  Patient remains on Levaquin and Flagyl for his infection, possible

pneumonia.


Patient mother states that he has history of bilateral hydronephrosis and he has

recent procedure done by Dr. Deras recently including transurethral incision of

the prostate to help him urinating, repeat imaging showed persistent 

hydronephrosis.  [place] catheter was placed when he came to the hospital.  We'll 

ask for urology reevaluation.  Low potassium replaced.


Surgical team recommended CAT scan of the abdomen and L this with rectal 

contrast which is pending





Objective





- Vital Signs


Vital signs: 


                                   Vital Signs











Temp  97.9 F   03/19/20 07:00


 


Pulse  70   03/19/20 08:13


 


Resp  16   03/19/20 07:00


 


BP  101/61   03/19/20 07:00


 


Pulse Ox  95   03/19/20 07:00








                                 Intake & Output











 03/18/20 03/19/20 03/19/20





 18:59 06:59 18:59


 


Intake Total 725  


 


Output Total 1800 1300 500


 


Balance -1075 -1300 -500


 


Weight 74.843 kg  


 


Intake:   


 


  Intake, IV Titration 725  





  Amount   


 


    Lacosamide  mg In 50  





    Sodium Chloride 0.9% 50   





    ml @ 100 mls/hr IVPB BID   





    [facility] Rx#:[id number]   


 


    Levofloxacin 750Mg-D5w 150  





    Pmx 750 mg In Dextrose/   





    Water 1 150ml.bag @ 100   





    mls/hr IVPB Q24H [facility] Rx#:   





    [id number]   


 


    Sodium Chloride 0.9% 1, 425  





    000 ml @ 100 mls/hr IV .   





    Q10H [facility] Rx#:[id number]   


 


    metroNIDAZOLE-NS  100  





    mg In Saline 1 100ml.bag   





    @ 100 mls/hr IVPB Q8HR   





    [facility] Rx#:[id number]   


 


Output:   


 


  Urine 1800 1300 500


 


    Uretheral ([place])   500


 


Other:   


 


  Voiding Method Indwelling Catheter Indwelling Catheter 














- Exam





-GENERAL: Average for age, no respiratory distress


HEENT: Pupils are round and equally reacting to light. EOMI. No scleral icterus.

No conjunctival pallor. Normocephalic, atraumatic. No pharyngeal erythema. No 

thyromegaly. 


CARDIOVASCULAR: S1 and S2 present. No murmurs, rubs, or gallops. 


PULMONARY: Chest is clear to auscultation, no wheezing or crackles. 


-ABDOMEN: Soft, nontender, nondistended, normoactive bowel sounds. No palpable 

organomegaly.  PEG tube is in a Place


MUSCULOSKELETAL: No joint swelling or deformity. 


EXTREMITIES: No cyanosis, clubbing, or pedal edema. 


NEUROLOGICAL: Gross neurological examination did not reveal any focal deficits. 


SKIN: No rashes. no petechiae.





- Labs


CBC & Chem 7: 


                                 03/18/20 06:40





                                 03/19/20 06:46


Labs: 


                  Abnormal Lab Results - Last 24 Hours (Table)











  03/18/20 03/19/20 Range/Units





  20:18 06:46 


 


Potassium  3.2 L  3.4 L  (3.5-5.1)  mmol/L


 


Chloride   109 H  ()  mmol/L


 


BUN   5 L  (9-20)  mg/dL


 


Creatinine   0.61 L  (0.66-1.25)  mg/dL








                      Microbiology - Last 24 Hours (Table)











 03/15/20 22:00 Urine Culture - Final





 Urine,Catheterized    Enterococcus faecalis VRE


 


 03/15/20 12:24 Blood Culture - Preliminary





 Blood    No Growth after 72 hours














Assessment and Plan


Assessment: 





Bowel obstruction secondary to fecal impaction


Possible aspiration pneumonia


Possible upper GI bleed


Bilateral hydronephrosis, he follows up with Dr. Malik


Suspicion for acute urinary tract infection and acute cystitis


Developmental delay status post Todd and tracheostomy


History of seizure





Plan: 








This is a pleasant 30 years old male who presents with bowel obstruction, 

possible aspiration pneumonia and UTI and GI bleed.  Patient is followed by 

several consult including surgery, gastroenterology, and infectious disease.  

Follow-up CAT scan of the pole ordered by surgery team.  Patient currently on 

antibiotics as per ID recommendation.  Continue with gentle hydration.  Follow-

up culture results.  Consult urology for his hydronephrosis


Labs and medication were reviewed..  Continue same treatment.  Continue with 

symptomatic treatment.  Resume home medication.  Monitor lytes and vitals.  DVT 

and GI prophylaxis.  Further recommendations of the clinical course of the 

patient


DVT prophylaxis: No heparin in view of possible GI bleed


GI Prophylaxis: Pepcid


PT/OT: Pending


Prognosis is guarded

## 2020-03-19 NOTE — PN
PROGRESS NOTE



DATE OF SERVICE:

03/19/2020



REASON FOR FOLLOWUP:

1. Aspiration pneumonitis.

2. VRE urinary tract infection.



INTERVAL HISTORY:

The patient is currently afebrile, has been breathing comfortably. the patient

has discomfort from constipation, but no vomiting has been reported or any 
respiratory

distress.  The patient did have a [place] catheter placed in the ER and sample 
obtained

from the same is now showing a VRE.



PHYSICAL EXAMINATION:

Blood pressure 101/61 with a pulse of 72, temperature 97.9. He is 95% on room 
air.

General description is a young male lying in bed in no distress.

RESPIRATORY SYSTEM: Unlabored breathing with decreased breath sounds at the 
base. No

wheeze.

HEART: S1, S2.  Regular rate and rhythm.

ABDOMEN: Soft. No tenderness.



LABS:

Creatinine 0.61. Urine with VRE  sensitive to penicillin.



DIAGNOSTIC IMPRESSION AND PLAN:

1. Patient with a vancomycin-resistant Enterococcus  urinary tract infection in 
this

    patient with PENICILLIN ALLERGY. Will add daptomycin.

2. Patient with aspiration pneumonitis, currently covered with Levaquin and 
Flagyl; to

    continue. Monitor his clinical course closely.





MMODL / IJN: [id number] / Job#: [id number]

KHALIF

## 2020-03-20 LAB
ANION GAP SERPL CALC-SCNC: 8 MMOL/L
BUN SERPL-SCNC: 5 MG/DL (ref 9–20)
CALCIUM SPEC-MCNC: 8.5 MG/DL (ref 8.4–10.2)
CHLORIDE SERPL-SCNC: 108 MMOL/L (ref 98–107)
CO2 SERPL-SCNC: 22 MMOL/L (ref 22–30)
GLUCOSE SERPL-MCNC: 89 MG/DL (ref 74–99)
KETONES UR QL STRIP.AUTO: (no result)
PH UR: 6 [PH] (ref 5–8)
POTASSIUM SERPL-SCNC: 3.1 MMOL/L (ref 3.5–5.1)
PROT UR QL: (no result)
RBC UR QL: 6 /HPF (ref 0–5)
SODIUM SERPL-SCNC: 138 MMOL/L (ref 137–145)
SP GR UR: 1.02 (ref 1–1.03)
SQUAMOUS UR QL AUTO: <1 /HPF (ref 0–4)
UROBILINOGEN UR QL STRIP: <2 MG/DL (ref ?–2)
WBC # UR AUTO: 111 /HPF (ref 0–5)

## 2020-03-20 RX ADMIN — LEVETIRACETAM SCH MG: 100 SOLUTION ORAL at 08:52

## 2020-03-20 RX ADMIN — CEFAZOLIN SCH MLS/HR: 330 INJECTION, POWDER, FOR SOLUTION INTRAMUSCULAR; INTRAVENOUS at 01:30

## 2020-03-20 RX ADMIN — LACOSAMIDE SCH MLS/HR: 10 INJECTION INTRAVENOUS at 08:50

## 2020-03-20 RX ADMIN — CEFAZOLIN SCH MLS/HR: 330 INJECTION, POWDER, FOR SOLUTION INTRAMUSCULAR; INTRAVENOUS at 03:38

## 2020-03-20 RX ADMIN — POTASSIUM BICARBONATE SCH MEQ: 782 TABLET, EFFERVESCENT ORAL at 08:53

## 2020-03-20 RX ADMIN — METOCLOPRAMIDE SCH MG: 5 INJECTION, SOLUTION INTRAMUSCULAR; INTRAVENOUS at 05:42

## 2020-03-20 RX ADMIN — LEVOFLOXACIN SCH MLS/HR: 750 INJECTION, SOLUTION INTRAVENOUS at 17:24

## 2020-03-20 RX ADMIN — IPRATROPIUM BROMIDE AND ALBUTEROL SULFATE SCH ML: .5; 3 SOLUTION RESPIRATORY (INHALATION) at 21:04

## 2020-03-20 RX ADMIN — TAMSULOSIN HYDROCHLORIDE SCH MG: 0.4 CAPSULE ORAL at 08:53

## 2020-03-20 RX ADMIN — METRONIDAZOLE SCH MLS/HR: 500 INJECTION, SOLUTION INTRAVENOUS at 17:25

## 2020-03-20 RX ADMIN — LEVETIRACETAM SCH MG: 100 SOLUTION ORAL at 00:17

## 2020-03-20 RX ADMIN — PANTOPRAZOLE SODIUM SCH MG: 40 INJECTION, POWDER, FOR SOLUTION INTRAVENOUS at 08:53

## 2020-03-20 RX ADMIN — LEVETIRACETAM SCH MG: 100 SOLUTION ORAL at 17:25

## 2020-03-20 RX ADMIN — METRONIDAZOLE SCH MLS/HR: 500 INJECTION, SOLUTION INTRAVENOUS at 08:52

## 2020-03-20 RX ADMIN — FOLIC ACID SCH MG: 1 TABLET ORAL at 08:53

## 2020-03-20 RX ADMIN — ASPIRIN SCH MG: 325 TABLET ORAL at 13:34

## 2020-03-20 RX ADMIN — HYDROMORPHONE HYDROCHLORIDE PRN MG: 1 INJECTION, SOLUTION INTRAMUSCULAR; INTRAVENOUS; SUBCUTANEOUS at 01:07

## 2020-03-20 RX ADMIN — IPRATROPIUM BROMIDE AND ALBUTEROL SULFATE SCH ML: .5; 3 SOLUTION RESPIRATORY (INHALATION) at 09:00

## 2020-03-20 RX ADMIN — POTASSIUM BICARBONATE SCH MEQ: 782 TABLET, EFFERVESCENT ORAL at 11:13

## 2020-03-20 RX ADMIN — METOCLOPRAMIDE SCH MG: 5 INJECTION, SOLUTION INTRAMUSCULAR; INTRAVENOUS at 13:45

## 2020-03-20 RX ADMIN — PANTOPRAZOLE SODIUM SCH MG: 40 INJECTION, POWDER, FOR SOLUTION INTRAVENOUS at 22:02

## 2020-03-20 RX ADMIN — METOCLOPRAMIDE SCH MG: 5 INJECTION, SOLUTION INTRAMUSCULAR; INTRAVENOUS at 17:25

## 2020-03-20 RX ADMIN — IPRATROPIUM BROMIDE AND ALBUTEROL SULFATE SCH ML: .5; 3 SOLUTION RESPIRATORY (INHALATION) at 12:13

## 2020-03-20 RX ADMIN — LACOSAMIDE SCH MLS/HR: 10 INJECTION INTRAVENOUS at 22:08

## 2020-03-20 RX ADMIN — HEPARIN SODIUM SCH UNIT: 5000 INJECTION, SOLUTION INTRAVENOUS; SUBCUTANEOUS at 08:53

## 2020-03-20 RX ADMIN — HEPARIN SODIUM SCH UNIT: 5000 INJECTION, SOLUTION INTRAVENOUS; SUBCUTANEOUS at 22:02

## 2020-03-20 RX ADMIN — SCOPALAMINE SCH PATCH: 1 PATCH, EXTENDED RELEASE TRANSDERMAL at 08:53

## 2020-03-20 NOTE — P.PN
Subjective


Progress Note Date: 03/20/20


Principal diagnosis: 


Acute hypoxic respiratory failure





Bilateral aspiration pneumonia





Bowel obstruction likely due to retained fecal material





Developmentally delayed





Status post chronic Peg tube and tracheostomy





Severe degree of seizure disorder





Past history of respiratory failure requiring vent support





03/20/2020, patient remains on trach collar without and supple dependent and 

oxygen at room air, oxygen saturation has been stable, afebrile, 93% room air, 

patient getting therapy for aspiration pneumonia





03/19/2020, patient seen eval examined during the rounds labs reviewed 

medications reviewed, as per request of the family trach has been evaluated, 

care plan discussed with the respiratory therapist as well, tracheostomy stoma 

appears to be stable inner cannula clean, no evidence of infection, no need to 

change tracheostomy





03/18/2020, patient seen and evaluated examined during the rounds overall 

remains stable pulmonary standpoint remains on antibiotics for aspiration 

pneumonia congestion is present, computed tomography scan of the abdomen 

revealed a large hiatal hernia, PEG tube has been stable,





03/17/2020, patient seen and evaluated examined overall no significant change 

and no cough or congestion is present no more episodes of vomiting has been no

stanley patient is getting therapy for pneumonia ID service has been following 

respiratory status remains stable





This is a 30-year-old status post trach and PEG due to developmental delay, 

patient has a history of chronic seizure disorder, patient's PEG tube has been 

recently changed about 2 months ago developed problems associated with severe 

constipation and vomiting and bluish blood-tinged secretions coming out through 

the PEG tube came into the hospital for further evaluation suspicion of pneum

onia, chest x-ray revealed bilateral lower lobe infiltrate consistent with 

aspiration pneumonia patient is currently getting broad-spectrum antibiotics 

finally he has a bowel movement is abdomen is more softer now, patient also has 

a computed tomography scan of the abdominal pelvis which revealed dilated loops 

of bowel and colon








Objective





- Vital Signs


Vital signs: 


                                   Vital Signs











Temp  97.2 F L  03/20/20 07:00


 


Pulse  70   03/20/20 09:19


 


Resp  18   03/20/20 07:00


 


BP  133/88   03/20/20 07:00


 


Pulse Ox  93 L  03/20/20 07:00








                                 Intake & Output











 03/19/20 03/20/20 03/20/20





 18:59 06:59 18:59


 


Intake Total  0 


 


Output Total 1000 1100 


 


Balance -1000 -1100 


 


Weight 74.843 kg  


 


Intake:   


 


  Oral  0 


 


Output:   


 


  Urine 1000 1100 


 


    Uretheral ([place]) 1000 1100 


 


Other:   


 


  Voiding Method Indwelling Catheter Indwelling Catheter 


 


  # Voids  1 


 


  # Bowel Movements 3  














- Exam


- Constitutional


General appearance: average body habitus, disheveled, no acute distress





- EENT


Eyes: EOMI, PERRLA


Ears: bilateral: normal





- Neck


Neck: normal ROM


Carotids: bilateral: upstroke normal





- Respiratory


Respiratory: bilateral: CTA, diminished, negative: dullness, rales, rhonchi





- Cardiovascular


Rhythm: regular


Heart sounds: normal: S1, S2





- Gastrointestinal


General gastrointestinal: decreased bowel sounds





- Musculoskeletal


Musculoskeletal: generalized weakness, strength equal bilaterally











- Labs


CBC & Chem 7: 


                                 03/18/20 06:40





                                 03/20/20 03:39


Labs: 


                  Abnormal Lab Results - Last 24 Hours (Table)











  03/20/20 Range/Units





  03:39 


 


Potassium  3.1 L  (3.5-5.1)  mmol/L


 


Chloride  108 H  ()  mmol/L


 


BUN  5 L  (9-20)  mg/dL


 


Creatinine  0.58 L  (0.66-1.25)  mg/dL








                      Microbiology - Last 24 Hours (Table)











 03/15/20 22:00 Urine Culture - Final





 Urine,Catheterized    Enterococcus faecalis VRE


 


 03/15/20 12:24 Blood Culture - Preliminary





 Blood    No Growth after 96 hours














Assessment and Plan


Assessment: 





Acute hypoxic respiratory failure





Bilateral aspiration pneumonia





Bowel obstruction likely due to retained fecal material





Developmentally delayed





Status post chronic Peg tube and tracheostomy





Severe degree of seizure disorder





Past history of respiratory failure requiring vent support


Plan: 





Maintain trach keep saturation over 92% supplemental oxygen as needed





Pulmonary toilet





Respiratory to suction as needed





Seizure precautions





Nothing by mouth for now





Hold tube feed





Broad-spectrum antibiotics for bilateral aspiration pneumonia





Further recommendations pending plan of care as per clinical response of the 

patient








Time with Patient: Greater than 30

## 2020-03-20 NOTE — P.PN
Progress Note - Text


Progress Note Date: 03/20/20





Patient's CAT scan performed last night shows no evidence of rectal obstruction.

 Patient did have a 6 albumin with a knuckle blasts minimal.





On exam his vital signs appear stable.  Abdomen soft there is still distention.





Colonic ileus.  Patient will be given GoLYTELY today.

## 2020-03-20 NOTE — P.PN
Subjective


Progress Note Date: 03/20/20


Principal diagnosis: 





This is a pleasant 30 years old male with past medical history of developmental 

delay, autism, seizure disorder, status post vagal neurostimulator, left ureter

al stent, presents this signs symptoms of bowel obstruction secondary to fecal 

impaction, surgeries been evaluated the patient GoLYTELY and has been tried with

no much success and enema with only moderate stool, and surgery team recommended

CAT scan of the abdomen and pelvis today.


Showing fecal impaction with possible small bowel dilatation suggestive of 

colonic ileus, possible cystitis


Patient is nothing by mouth and hold tube feeds, hemodynamically stable, blood 

pressure is 94/55, which is similar to last few days on the presentation.  Low 

potassium is been replaced, WBC is normal 5.5K, urine cultures, group D 

enterococcus





03/19/2020


Patient is lying comfortable in bed, no distress, mother at bedside, patient 

didn't have bowel movement since yesterday, no abdominal tenderness or signif

icant pain, the mother does not think he needs abdominal pain medication for 

now.  He got several laxative for his fecal impaction contributing to his bowel 

obstruction/ileus.  His hemoglobin is stable at 11.2.  No other episodes of GI 

bleed.  Patient remains on Levaquin and Flagyl for his infection, possible 

pneumonia.


Patient mother states that he has history of bilateral hydronephrosis and he has

recent procedure done by Dr. Deras recently including transurethral incision of

the prostate to help him urinating, repeat imaging showed persistent 

hydronephrosis.  [place] catheter was placed when he came to the hospital.  We'll 

ask for urology reevaluation.  Low potassium replaced.


Surgical team recommended CAT scan of the abdomen and L this with rectal 

contrast which is pending





03/20/2020


Patient is seen and evaluated in follow-up today sleeping and appears to be in 

no acute distress.  Patient does have a trach collar and is currently on room 

air.  Per nursing staff patient had 4 large bowel movements yesterday but CT of 

the abdomen continues to show a narrowing of the redundant sigmoid colon midline

that could possibly relate to stricture, spasm, or possible neoplasm although 

unlikely, severe left and moderate right hydronephrosis and urinary bladder 

circumferential thickening.  Patient is being followed by urology and sees them 

in the outpatient setting as well.  Patient continues to have an indwelling 

Bar[place]ona catheter and will continue at this time.  Patient remains on IV ant

ibiotics in the form of daptomycin, Levaquin, and Flagyl and will continue at 

this time.  Infectious disease is following.  Patient is also being evaluated by

surgery and will continue with GoLYTELY today.  Will continue to monitor 

closely.





Objective





- Vital Signs


Vital signs: 


                                   Vital Signs











Temp  97.2 F L  03/20/20 07:00


 


Pulse  64   03/20/20 12:27


 


Resp  18   03/20/20 07:00


 


BP  133/88   03/20/20 07:00


 


Pulse Ox  93 L  03/20/20 07:00








                                 Intake & Output











 03/19/20 03/20/20 03/20/20





 18:59 06:59 18:59


 


Intake Total  0 


 


Output Total 1000 1100 


 


Balance -1000 -1100 


 


Weight 74.843 kg  


 


Intake:   


 


  Oral  0 


 


Output:   


 


  Urine 1000 1100 


 


    Uretheral ([place]) 1000 1100 


 


Other:   


 


  Voiding Method Indwelling Catheter Indwelling Catheter 


 


  # Voids  1 


 


  # Bowel Movements 3  














- Exam





-GENERAL: Average for age, no respiratory distress, noted to be resting but 

arousable


HEENT: Pupils are round and equally reacting to light. EOMI. No scleral icterus.

No conjunctival pallor. Normocephalic, atraumatic. No pharyngeal erythema. No 

thyromegaly. 


CARDIOVASCULAR: S1 and S2 present. No murmurs, rubs, or gallops. 


PULMONARY: Chest is clear to auscultation, no wheezing or crackles. 


-ABDOMEN: Soft, nontender, nondistended, normoactive bowel sounds. No palpable 

organomegaly.  PEG tube is in a Place.  Tube feeding on hold at this time per 

surgery


MUSCULOSKELETAL: No joint swelling or deformity. 


EXTREMITIES: No cyanosis, clubbing, or pedal edema. 


NEUROLOGICAL: Gross neurological examination did not reveal any focal deficits. 


SKIN: No rashes. no petechiae.





- Labs


CBC & Chem 7: 


                                 03/18/20 06:40





                                 03/20/20 03:39


Labs: 


                  Abnormal Lab Results - Last 24 Hours (Table)











  03/20/20 Range/Units





  03:39 


 


Potassium  3.1 L  (3.5-5.1)  mmol/L


 


Chloride  108 H  ()  mmol/L


 


BUN  5 L  (9-20)  mg/dL


 


Creatinine  0.58 L  (0.66-1.25)  mg/dL








                      Microbiology - Last 24 Hours (Table)











 03/15/20 22:00 Urine Culture - Final





 Urine,Catheterized    Enterococcus faecalis VRE


 


 03/15/20 12:24 Blood Culture - Preliminary





 Blood    No Growth after 96 hours














Assessment and Plan


Assessment: 





Bowel obstruction secondary to fecal impaction


Possible aspiration pneumonia


Hypokalemia


Possible upper GI bleed


Bilateral hydronephrosis, he follows up with Dr. Pinon


Suspicion for acute urinary tract infection and acute cystitis


Developmental delay status post Todd and tracheostomy


History of seizure





Plan: 








This is a pleasant 30 years old male who presents with bowel obstruction, 

possible aspiration pneumonia and UTI and GI bleed.  Patient is followed by 

several consult including surgery, gastroenterology, and infectious disease.  CT

of the abdomen pelvis performed as mentioned previously.  Patient currently on 

antibiotics as per ID recommendation.  Continue with gentle hydration.  Follow-

up culture results.  Blood cultures remain negative and urine cultures finalized

showing enterococcus faecalis VRE.  urology following.  Patient will continue 

with GoLYTELY and will be monitored closely.  Tube Feedings on hold at this 

time.


Labs and medication were reviewed.. Potassium 3.1 today and will be replaced.  

Will repeat a.m. labs.  Continue same treatment.  Continue with symptomatic 

treatment.  Resume home medication.  Monitor lytes and vitals.  DVT and GI 

prophylaxis.  Further recommendations of the clinical course of the patient


DVT prophylaxis: No heparin in view of possible GI bleed


GI Prophylaxis: Pepcid


PT/OT: Pending


Prognosis is guarded

## 2020-03-20 NOTE — PN
PROGRESS NOTE



DATE OF SERVICE:

03/20/2020



REASON FOR FOLLOWUP:

1. VRE urinary tract infection.

2. Aspiration pneumonia.



INTERVAL HISTORY:

The patient is currently afebrile, has been breathing comfortably.  He did have

multiple bowel movements.  No further vomiting reported.  The patient is currently

unable to provide any history.  He did have a Bar[place]ona catheter was placed in the ER.



PHYSICAL EXAMINATION:

Blood pressure 98/54 with a pulse of 63, temperature 97.5.  He is 98% on room air.

General description is a middle-aged male lying in bed in no distress.  Respiratory

system: Unlabored breathing. Clear to auscultation anteriorly.  Heart S1, S2.  Regular

rate and rhythm.  Abdomen soft, no drainage.



LABS:

Creatinine 0.58.



DIAGNOSTIC IMPRESSION AND PLAN:

1. Patient with VRE urinary tract infection.  Repeat urine culture has been noted.  If

    overall negative, the patient may not need antibiotic on discharge especially for

    the VRE urinary tract infection.

2. Aspiration pneumonitis covered with Levaquin and Flagyl. Plan to switch to short

    course of oral on discharge.





MMODL / IJN: [id number] / Job#: [id number]

## 2020-03-21 LAB
ANION GAP SERPL CALC-SCNC: 10 MMOL/L
BASOPHILS # BLD AUTO: 0 K/UL (ref 0–0.2)
BASOPHILS NFR BLD AUTO: 0 %
BUN SERPL-SCNC: 4 MG/DL (ref 9–20)
CALCIUM SPEC-MCNC: 8.8 MG/DL (ref 8.4–10.2)
CHLORIDE SERPL-SCNC: 107 MMOL/L (ref 98–107)
CO2 SERPL-SCNC: 22 MMOL/L (ref 22–30)
EOSINOPHIL # BLD AUTO: 0.1 K/UL (ref 0–0.7)
EOSINOPHIL NFR BLD AUTO: 3 %
ERYTHROCYTE [DISTWIDTH] IN BLOOD BY AUTOMATED COUNT: 3.85 M/UL (ref 4.3–5.9)
ERYTHROCYTE [DISTWIDTH] IN BLOOD: 15.3 % (ref 11.5–15.5)
GLUCOSE SERPL-MCNC: 74 MG/DL (ref 74–99)
HCT VFR BLD AUTO: 36.7 % (ref 39–53)
HGB BLD-MCNC: 12.8 GM/DL (ref 13–17.5)
LYMPHOCYTES # SPEC AUTO: 0.8 K/UL (ref 1–4.8)
LYMPHOCYTES NFR SPEC AUTO: 17 %
MCH RBC QN AUTO: 33.2 PG (ref 25–35)
MCHC RBC AUTO-ENTMCNC: 34.8 G/DL (ref 31–37)
MCV RBC AUTO: 95.3 FL (ref 80–100)
MONOCYTES # BLD AUTO: 0.2 K/UL (ref 0–1)
MONOCYTES NFR BLD AUTO: 5 %
NEUTROPHILS # BLD AUTO: 3.5 K/UL (ref 1.3–7.7)
NEUTROPHILS NFR BLD AUTO: 74 %
PLATELET # BLD AUTO: 227 K/UL (ref 150–450)
POTASSIUM SERPL-SCNC: 3.3 MMOL/L (ref 3.5–5.1)
SODIUM SERPL-SCNC: 139 MMOL/L (ref 137–145)
WBC # BLD AUTO: 4.7 K/UL (ref 3.8–10.6)

## 2020-03-21 RX ADMIN — HEPARIN SODIUM SCH UNIT: 5000 INJECTION, SOLUTION INTRAVENOUS; SUBCUTANEOUS at 20:15

## 2020-03-21 RX ADMIN — HEPARIN SODIUM SCH UNIT: 5000 INJECTION, SOLUTION INTRAVENOUS; SUBCUTANEOUS at 10:18

## 2020-03-21 RX ADMIN — ASPIRIN SCH MG: 325 TABLET ORAL at 00:14

## 2020-03-21 RX ADMIN — IPRATROPIUM BROMIDE AND ALBUTEROL SULFATE SCH ML: .5; 3 SOLUTION RESPIRATORY (INHALATION) at 20:41

## 2020-03-21 RX ADMIN — LACOSAMIDE SCH MLS/HR: 10 INJECTION INTRAVENOUS at 10:09

## 2020-03-21 RX ADMIN — METRONIDAZOLE SCH MLS/HR: 500 INJECTION, SOLUTION INTRAVENOUS at 18:09

## 2020-03-21 RX ADMIN — LEVETIRACETAM SCH MG: 100 SOLUTION ORAL at 10:19

## 2020-03-21 RX ADMIN — METOCLOPRAMIDE SCH MG: 5 INJECTION, SOLUTION INTRAMUSCULAR; INTRAVENOUS at 00:13

## 2020-03-21 RX ADMIN — METRONIDAZOLE SCH MLS/HR: 500 INJECTION, SOLUTION INTRAVENOUS at 10:48

## 2020-03-21 RX ADMIN — HYDROMORPHONE HYDROCHLORIDE PRN MG: 1 INJECTION, SOLUTION INTRAMUSCULAR; INTRAVENOUS; SUBCUTANEOUS at 15:00

## 2020-03-21 RX ADMIN — HYDROMORPHONE HYDROCHLORIDE PRN MG: 1 INJECTION, SOLUTION INTRAMUSCULAR; INTRAVENOUS; SUBCUTANEOUS at 20:16

## 2020-03-21 RX ADMIN — IPRATROPIUM BROMIDE AND ALBUTEROL SULFATE SCH ML: .5; 3 SOLUTION RESPIRATORY (INHALATION) at 12:20

## 2020-03-21 RX ADMIN — FOLIC ACID SCH MG: 1 TABLET ORAL at 10:18

## 2020-03-21 RX ADMIN — METOCLOPRAMIDE SCH MG: 5 INJECTION, SOLUTION INTRAMUSCULAR; INTRAVENOUS at 13:38

## 2020-03-21 RX ADMIN — HYDROMORPHONE HYDROCHLORIDE PRN MG: 1 INJECTION, SOLUTION INTRAMUSCULAR; INTRAVENOUS; SUBCUTANEOUS at 08:16

## 2020-03-21 RX ADMIN — LEVETIRACETAM SCH MG: 100 SOLUTION ORAL at 17:50

## 2020-03-21 RX ADMIN — POTASSIUM BICARBONATE SCH MEQ: 782 TABLET, EFFERVESCENT ORAL at 17:50

## 2020-03-21 RX ADMIN — PANTOPRAZOLE SODIUM SCH MG: 40 INJECTION, POWDER, FOR SOLUTION INTRAVENOUS at 10:18

## 2020-03-21 RX ADMIN — METRONIDAZOLE SCH MLS/HR: 500 INJECTION, SOLUTION INTRAVENOUS at 00:13

## 2020-03-21 RX ADMIN — LEVOFLOXACIN SCH MLS/HR: 750 INJECTION, SOLUTION INTRAVENOUS at 15:49

## 2020-03-21 RX ADMIN — ASPIRIN SCH MG: 325 TABLET ORAL at 13:37

## 2020-03-21 RX ADMIN — TAMSULOSIN HYDROCHLORIDE SCH MG: 0.4 CAPSULE ORAL at 10:18

## 2020-03-21 RX ADMIN — POTASSIUM BICARBONATE SCH MEQ: 782 TABLET, EFFERVESCENT ORAL at 15:49

## 2020-03-21 RX ADMIN — CEFAZOLIN SCH MLS/HR: 330 INJECTION, POWDER, FOR SOLUTION INTRAMUSCULAR; INTRAVENOUS at 06:22

## 2020-03-21 RX ADMIN — METOCLOPRAMIDE SCH MG: 5 INJECTION, SOLUTION INTRAMUSCULAR; INTRAVENOUS at 06:22

## 2020-03-21 RX ADMIN — IPRATROPIUM BROMIDE AND ALBUTEROL SULFATE SCH ML: .5; 3 SOLUTION RESPIRATORY (INHALATION) at 09:00

## 2020-03-21 RX ADMIN — LEVETIRACETAM SCH MG: 100 SOLUTION ORAL at 00:14

## 2020-03-21 RX ADMIN — METOCLOPRAMIDE SCH MG: 5 INJECTION, SOLUTION INTRAMUSCULAR; INTRAVENOUS at 20:15

## 2020-03-21 RX ADMIN — LACOSAMIDE SCH MLS/HR: 10 INJECTION INTRAVENOUS at 21:11

## 2020-03-21 RX ADMIN — PANTOPRAZOLE SODIUM SCH MG: 40 INJECTION, POWDER, FOR SOLUTION INTRAVENOUS at 20:16

## 2020-03-21 NOTE — P.PN
Progress Note - Text


Progress Note Date: 03/21/20











The patient had several bowel movements yesterday.





On exam his vital signs are stable.  His abdomen is soft.





Status post resolving colonic ileus.  Patient will continue with his diet.

## 2020-03-21 NOTE — PN
PROGRESS NOTE



DATE OF SERVICE:

03/21/20.



REASON FOR FOLLOWUP:

Aspiration pneumonia and VRE urinary tract infection.



INTERVAL HISTORY:

The patient is currently afebrile.  The patient seemed to be resting comfortably.  He

is awake, alert, not able to communicate or provide any history.  No vomiting or

diarrhea has been reported.



PHYSICAL EXAMINATION:

Blood pressure 106/52 with a pulse of 77.  Temperature 97.3.  He is 98% on room air.

General description is a middle-aged male lying in bed in no distress.  Respiratory

system: Unlabored breathing. Clear to auscultation anteriorly.  Heart S1, S2.  Regular

rate and rhythm.

ABDOMEN:  Soft. No tenderness.



LABS:

Repeat urine is currently slightly better compared to initial UA.



DIAGNOSTIC IMPRESSION AND PLAN:

1. Patient with VRE urinary tract infection.  Urinalysis seemed to have improved.  He

    will continue with daptomycin which can be discontinued on Monday morning and

    finish a short course of oral Macrobid.

2. The patient with aspiration pneumonitis on Levaquin and Flagyl.  To continue and

    monitor clinical course closely.





MMODL / IJN: [id number] / Job#: [id number]

## 2020-03-21 NOTE — P.PN
Subjective





This is a pleasant 30 years old male with past medical history of developmental 

delay, autism, seizure disorder, status post vagal neurostimulator, left 

ureteral stent, presents this signs symptoms of bowel obstruction secondary to 

fecal impaction, surgeries been evaluated the patient GoLYTELY and has been 

tried with no much success and enema with only moderate stool, and surgery team 

recommended CAT scan of the abdomen and pelvis today.


Showing fecal impaction with possible small bowel dilatation suggestive of 

colonic ileus, possible cystitis


Patient is nothing by mouth and hold tube feeds, hemodynamically stable, blood 

pressure is 94/55, which is similar to last few days on the presentation.  Low 

potassium is been replaced, WBC is normal 5.5K, urine cultures, group D 

enterococcus





03/19/2020


Patient is lying comfortable in bed, no distress, mother at bedside, patient 

didn't have bowel movement since yesterday, no abdominal tenderness or 

significant pain, the mother does not think he needs abdominal pain medication 

for now.  He got several laxative for his fecal impaction contributing to his 

bowel obstruction/ileus.  His hemoglobin is stable at 11.2.  No other episodes 

of GI bleed.  Patient remains on Levaquin and Flagyl for his infection, possible

pneumonia.


Patient mother states that he has history of bilateral hydronephrosis and he has

recent procedure done by Dr. Deras recently including transurethral incision of

the prostate to help him urinating, repeat imaging showed persistent 

hydronephrosis.  Bar[place] catheter was placed when he came to the hospital.  We'll 

ask for urology reevaluation.  Low potassium replaced.


Surgical team recommended CAT scan of the abdomen and L this with rectal 

contrast which is pending





03/20/2020


Patient is seen and evaluated in follow-up today sleeping and appears to be in 

no acute distress.  Patient does have a trach collar and is currently on room 

air.  Per nursing staff patient had 4 large bowel movements yesterday but CT of 

the abdomen continues to show a narrowing of the redundant sigmoid colon midline

that could possibly relate to stricture, spasm, or possible neoplasm although 

unlikely, severe left and moderate right hydronephrosis and urinary bladder 

circumferential thickening.  Patient is being followed by urology and sees them 

in the outpatient setting as well.  Patient continues to have an indwelling 

Bar[place]ona catheter and will continue at this time.  Patient remains on IV 

antibiotics in the form of daptomycin, Levaquin, and Flagyl and will continue at

this time.  Infectious disease is following.  Patient is also being evaluated by

surgery and will continue with GoLYTELY today.  Will continue to monitor 

closely.





03/21/2020


Patient is doing better as he starts having some bowel movement with GoLYTELY.  

Repeat CAT scan showed no obstruction and surgery consult on the case closely.  

On Bar[place] catheter.  PEG tube is on hold.  Tracheostomy is in a Place.  No se

izure no other active issues.  Discussed with staff





Objective





- Vital Signs


Vital signs: 


                                   Vital Signs











Temp  98.3 F   03/21/20 07:00


 


Pulse  64   03/21/20 12:32


 


Resp  17   03/21/20 07:00


 


BP  107/69   03/21/20 07:00


 


Pulse Ox  92 L  03/21/20 07:00








                                 Intake & Output











 03/20/20 03/21/20 03/21/20





 18:59 06:59 18:59


 


Output Total 1700 1300 1430


 


Balance -1700 -1300 -1430


 


Weight 74.843 kg  


 


Output:   


 


  Urine 1700 1300 1430


 


    Uretheral ([place])  800 800


 


Other:   


 


  Voiding Method Indwelling Catheter Indwelling Catheter Indwelling Catheter














- Exam





-GENERAL: Average for age, no respiratory distress


HEENT: Pupils are round and equally reacting to light. EOMI. No scleral icterus.

No conjunctival pallor. Normocephalic, atraumatic. No pharyngeal erythema. No 

thyromegaly. 


CARDIOVASCULAR: S1 and S2 present. No murmurs, rubs, or gallops. 


PULMONARY: Chest is clear to auscultation, no wheezing or crackles. 


-ABDOMEN: Soft, nontender, nondistended, normoactive bowel sounds. No palpable 

organomegaly.  PEG tube is in a Place


MUSCULOSKELETAL: No joint swelling or deformity. 


EXTREMITIES: No cyanosis, clubbing, or pedal edema. 


NEUROLOGICAL: Gross neurological examination did not reveal any focal deficits. 


SKIN: No rashes. no petechiae.





- Labs


CBC & Chem 7: 


                                 03/21/20 06:19





                                 03/21/20 06:19


Labs: 


                  Abnormal Lab Results - Last 24 Hours (Table)











  03/20/20 03/21/20 03/21/20 Range/Units





  23:10 06:19 06:19 


 


RBC    3.85 L  (4.30-5.90)  m/uL


 


Hgb    12.8 L  (13.0-17.5)  gm/dL


 


Hct    36.7 L  (39.0-53.0)  %


 


Lymphocytes #    0.8 L  (1.0-4.8)  k/uL


 


Potassium   3.3 L   (3.5-5.1)  mmol/L


 


BUN   4 L   (9-20)  mg/dL


 


Creatinine   0.63 L   (0.66-1.25)  mg/dL


 


Urine Protein  1+ H    (Negative)  


 


Urine Ketones  3+ H    (Negative)  


 


Urine Blood  Trace H    (Negative)  


 


Ur Leukocyte Esterase  Large H    (Negative)  


 


Urine RBC  6 H    (0-5)  /hpf


 


Urine WBC  111 H    (0-5)  /hpf


 


Urine Bacteria  Rare H    (None)  /hpf


 


Urine Mucus  Occasional H    (None)  /hpf








                      Microbiology - Last 24 Hours (Table)











 03/20/20 23:10 Urine Culture - Preliminary





 Urine,Voided 


 


 03/15/20 12:24 Blood Culture - Preliminary





 Blood    No Growth after 120 hours














Assessment and Plan


Assessment: 





Bowel obstruction secondary to fecal impaction


Possible aspiration pneumonia


Possible upper GI bleed


Bilateral hydronephrosis, he follows up with Dr. Malik


Suspicion for acute urinary tract infection and acute cystitis


Developmental delay status post Todd and tracheostomy


History of seizure





Plan: 








This is a pleasant 30 years old male who presents with bowel obstruction, 

possible aspiration pneumonia and UTI and GI bleed.  Patient is followed by 

several consult including surgery, gastroenterology, and infectious disease.  

Follow-up CAT scan of the pole ordered by surgery team.  Patient currently on 

antibiotics as per ID recommendation.  Continue with gentle hydration.  Follow-

up culture results.  Consult urology for his hydronephrosis


Labs and medication were reviewed..  Continue same treatment.  Continue with 

symptomatic treatment.  Resume home medication.  Monitor lytes and vitals.  DVT 

and GI prophylaxis.  Further recommendations of the clinical course of the 

patient


DVT prophylaxis: No heparin in view of possible GI bleed


GI Prophylaxis: Pepcid


PT/OT: Pending


Prognosis is guarded

## 2020-03-21 NOTE — P.PN
Subjective


Progress Note Date: 03/21/20


Principal diagnosis: 


Acute hypoxic respiratory failure





Bilateral aspiration pneumonia





Bowel obstruction likely due to retained fecal material





Developmentally delayed





Status post chronic Peg tube and tracheostomy





Severe degree of seizure disorder





Past history of respiratory failure requiring vent support





03/21/2020, patient seen eval examined during the rounds labs reviewed 

medications reviewed, urine culture is positive for Enterococcus faecalis VRE, 

respiratory standpoint doing well mild coarse breath sounds present at overall 

on room air saturation is 95-96% tolerating antibiotics well, noted is made of 

the abnormal computed tomography scan suggesting of narrowing and sigmoid colon 

level likely spasm or stricture versus neoplasm, along with bilateral 

hydronephrosis and thickening of the bladder urology and general surgery has 

been following





03/20/2020, patient remains on trach collar without and supple dependent and 

oxygen at room air, oxygen saturation has been stable, afebrile, 93% room air, 

patient getting therapy for aspiration pneumonia





03/19/2020, patient seen eval examined during the rounds labs reviewed 

medications reviewed, as per request of the family trach has been evaluated, 

care plan discussed with the respiratory therapist as well, tracheostomy stoma 

appears to be stable inner cannula clean, no evidence of infection, no need to 

change tracheostomy





03/18/2020, patient seen and evaluated examined during the rounds overall 

remains stable pulmonary standpoint remains on antibiotics for aspiration 

pneumonia congestion is present, computed tomography scan of the abdomen 

revealed a large hiatal hernia, PEG tube has been stable,





03/17/2020, patient seen and evaluated examined overall no significant change 

and no cough or congestion is present no more episodes of vomiting has been 

noted patient is getting therapy for pneumonia ID service has been following 

respiratory status remains stable





This is a 30-year-old status post trach and PEG due to developmental delay, 

patient has a history of chronic seizure disorder, patient's PEG tube has been 

recently changed about 2 months ago developed problems associated with severe 

constipation and vomiting and bluish blood-tinged secretions coming out through 

the PEG tube came into the hospital for further evaluation suspicion of 

pneumonia, chest x-ray revealed bilateral lower lobe infiltrate consistent with 

aspiration pneumonia patient is currently getting broad-spectrum antibiotics 

finally he has a bowel movement is abdomen is more softer now, patient also has 

a computed tomography scan of the abdominal pelvis which revealed dilated loops 

of bowel and colon








Objective





- Vital Signs


Vital signs: 


                                   Vital Signs











Temp  98.3 F   03/21/20 07:00


 


Pulse  70   03/21/20 09:20


 


Resp  17   03/21/20 07:00


 


BP  107/69   03/21/20 07:00


 


Pulse Ox  92 L  03/21/20 07:00








                                 Intake & Output











 03/20/20 03/21/20 03/21/20





 18:59 06:59 18:59


 


Output Total 1700 1300 


 


Balance -1700 -1300 


 


Weight 74.843 kg  


 


Output:   


 


  Urine 1700 1300 


 


    Uretheral ([place])  800 


 


Other:   


 


  Voiding Method Indwelling Catheter Indwelling Catheter 














- Exam


- Constitutional


General appearance: average body habitus, disheveled, no acute distress





- EENT


Eyes: EOMI, PERRLA


Ears: bilateral: normal





- Neck


Neck: normal ROM


Carotids: bilateral: upstroke normal





- Respiratory


Respiratory: bilateral: CTA, diminished, negative: dullness, rales, rhonchi





- Cardiovascular


Rhythm: regular


Heart sounds: normal: S1, S2





- Gastrointestinal


General gastrointestinal: decreased bowel sounds





- Musculoskeletal


Musculoskeletal: generalized weakness, strength equal bilaterally











- Labs


CBC & Chem 7: 


                                 03/21/20 06:19





                                 03/21/20 06:19


Labs: 


                  Abnormal Lab Results - Last 24 Hours (Table)











  03/20/20 03/21/20 03/21/20 Range/Units





  23:10 06:19 06:19 


 


RBC    3.85 L  (4.30-5.90)  m/uL


 


Hgb    12.8 L  (13.0-17.5)  gm/dL


 


Hct    36.7 L  (39.0-53.0)  %


 


Lymphocytes #    0.8 L  (1.0-4.8)  k/uL


 


Potassium   3.3 L   (3.5-5.1)  mmol/L


 


BUN   4 L   (9-20)  mg/dL


 


Creatinine   0.63 L   (0.66-1.25)  mg/dL


 


Urine Protein  1+ H    (Negative)  


 


Urine Ketones  3+ H    (Negative)  


 


Urine Blood  Trace H    (Negative)  


 


Ur Leukocyte Esterase  Large H    (Negative)  


 


Urine RBC  6 H    (0-5)  /hpf


 


Urine WBC  111 H    (0-5)  /hpf


 


Urine Bacteria  Rare H    (None)  /hpf


 


Urine Mucus  Occasional H    (None)  /hpf








                      Microbiology - Last 24 Hours (Table)











 03/15/20 12:24 Blood Culture - Preliminary





 Blood    No Growth after 120 hours


 


 03/15/20 22:00 Urine Culture - Final





 Urine,Catheterized    Enterococcus faecalis VRE














Assessment and Plan


Assessment: 





Acute hypoxic respiratory failure, status post tract





Bilateral aspiration pneumonia





Bowel obstruction at sigmoid colon level stricture/spasm/neoplasm





Bilateral hydronephrosis with bladder wall thickening





Developmentally delayed





Status post chronic Peg tube and tracheostomy





Severe degree of seizure disorder





Past history of respiratory failure requiring vent support


Plan: 





Maintain trach keep saturation over 92% supplemental oxygen as needed





Pulmonary toilet





Respiratory to suction as needed





Seizure precautions





Nothing by mouth for now





Hold tube feed





Broad-spectrum antibiotics for bilateral aspiration pneumonia





Further recommendations pending plan of care as per clinical response of the 

patient





Further evaluation by urology and general surgery for bilateral hydronephrosis 

and bowel obstruction in progress








Time with Patient: Greater than 30

## 2020-03-22 LAB
ANION GAP SERPL CALC-SCNC: 7 MMOL/L
BASOPHILS # BLD AUTO: 0 K/UL (ref 0–0.2)
BASOPHILS NFR BLD AUTO: 0 %
BUN SERPL-SCNC: 3 MG/DL (ref 9–20)
CALCIUM SPEC-MCNC: 8.8 MG/DL (ref 8.4–10.2)
CHLORIDE SERPL-SCNC: 106 MMOL/L (ref 98–107)
CO2 SERPL-SCNC: 25 MMOL/L (ref 22–30)
EOSINOPHIL # BLD AUTO: 0.2 K/UL (ref 0–0.7)
EOSINOPHIL NFR BLD AUTO: 4 %
ERYTHROCYTE [DISTWIDTH] IN BLOOD BY AUTOMATED COUNT: 3.95 M/UL (ref 4.3–5.9)
ERYTHROCYTE [DISTWIDTH] IN BLOOD: 15.5 % (ref 11.5–15.5)
GLUCOSE SERPL-MCNC: 86 MG/DL (ref 74–99)
HCT VFR BLD AUTO: 38.5 % (ref 39–53)
HGB BLD-MCNC: 13.1 GM/DL (ref 13–17.5)
LYMPHOCYTES # SPEC AUTO: 0.9 K/UL (ref 1–4.8)
LYMPHOCYTES NFR SPEC AUTO: 24 %
MCH RBC QN AUTO: 33.1 PG (ref 25–35)
MCHC RBC AUTO-ENTMCNC: 34 G/DL (ref 31–37)
MCV RBC AUTO: 97.3 FL (ref 80–100)
MONOCYTES # BLD AUTO: 0.3 K/UL (ref 0–1)
MONOCYTES NFR BLD AUTO: 7 %
NEUTROPHILS # BLD AUTO: 2.4 K/UL (ref 1.3–7.7)
NEUTROPHILS NFR BLD AUTO: 62 %
PLATELET # BLD AUTO: 206 K/UL (ref 150–450)
POTASSIUM SERPL-SCNC: 3.3 MMOL/L (ref 3.5–5.1)
SODIUM SERPL-SCNC: 138 MMOL/L (ref 137–145)
WBC # BLD AUTO: 3.8 K/UL (ref 3.8–10.6)

## 2020-03-22 RX ADMIN — LACOSAMIDE SCH MLS/HR: 10 INJECTION INTRAVENOUS at 08:20

## 2020-03-22 RX ADMIN — PANTOPRAZOLE SODIUM SCH MG: 40 INJECTION, POWDER, FOR SOLUTION INTRAVENOUS at 21:20

## 2020-03-22 RX ADMIN — METOCLOPRAMIDE SCH MG: 5 INJECTION, SOLUTION INTRAMUSCULAR; INTRAVENOUS at 05:33

## 2020-03-22 RX ADMIN — HEPARIN SODIUM SCH UNIT: 5000 INJECTION, SOLUTION INTRAVENOUS; SUBCUTANEOUS at 08:21

## 2020-03-22 RX ADMIN — IPRATROPIUM BROMIDE AND ALBUTEROL SULFATE SCH ML: .5; 3 SOLUTION RESPIRATORY (INHALATION) at 13:39

## 2020-03-22 RX ADMIN — METRONIDAZOLE SCH MLS/HR: 500 INJECTION, SOLUTION INTRAVENOUS at 00:23

## 2020-03-22 RX ADMIN — METOCLOPRAMIDE SCH MG: 5 INJECTION, SOLUTION INTRAMUSCULAR; INTRAVENOUS at 11:28

## 2020-03-22 RX ADMIN — HEPARIN SODIUM SCH UNIT: 5000 INJECTION, SOLUTION INTRAVENOUS; SUBCUTANEOUS at 21:20

## 2020-03-22 RX ADMIN — POTASSIUM BICARBONATE SCH MEQ: 782 TABLET, EFFERVESCENT ORAL at 08:29

## 2020-03-22 RX ADMIN — CEFAZOLIN SCH MLS/HR: 330 INJECTION, POWDER, FOR SOLUTION INTRAMUSCULAR; INTRAVENOUS at 05:34

## 2020-03-22 RX ADMIN — IPRATROPIUM BROMIDE AND ALBUTEROL SULFATE SCH ML: .5; 3 SOLUTION RESPIRATORY (INHALATION) at 08:47

## 2020-03-22 RX ADMIN — POTASSIUM BICARBONATE SCH MEQ: 782 TABLET, EFFERVESCENT ORAL at 10:48

## 2020-03-22 RX ADMIN — METOCLOPRAMIDE SCH MG: 5 INJECTION, SOLUTION INTRAMUSCULAR; INTRAVENOUS at 00:22

## 2020-03-22 RX ADMIN — CEFAZOLIN SCH MLS/HR: 330 INJECTION, POWDER, FOR SOLUTION INTRAMUSCULAR; INTRAVENOUS at 08:24

## 2020-03-22 RX ADMIN — PANTOPRAZOLE SODIUM SCH MG: 40 INJECTION, POWDER, FOR SOLUTION INTRAVENOUS at 08:20

## 2020-03-22 RX ADMIN — LACTULOSE SCH GM: 20 SOLUTION ORAL at 21:23

## 2020-03-22 RX ADMIN — FOLIC ACID SCH MG: 1 TABLET ORAL at 08:21

## 2020-03-22 RX ADMIN — HYDROMORPHONE HYDROCHLORIDE PRN MG: 1 INJECTION, SOLUTION INTRAMUSCULAR; INTRAVENOUS; SUBCUTANEOUS at 14:52

## 2020-03-22 RX ADMIN — METOCLOPRAMIDE SCH MG: 5 INJECTION, SOLUTION INTRAMUSCULAR; INTRAVENOUS at 17:04

## 2020-03-22 RX ADMIN — METRONIDAZOLE SCH MLS/HR: 500 INJECTION, SOLUTION INTRAVENOUS at 07:02

## 2020-03-22 RX ADMIN — ASPIRIN SCH MG: 325 TABLET ORAL at 00:23

## 2020-03-22 RX ADMIN — IPRATROPIUM BROMIDE AND ALBUTEROL SULFATE SCH ML: .5; 3 SOLUTION RESPIRATORY (INHALATION) at 19:38

## 2020-03-22 RX ADMIN — LEVETIRACETAM SCH MG: 100 SOLUTION ORAL at 08:21

## 2020-03-22 RX ADMIN — LEVOFLOXACIN SCH MLS/HR: 750 INJECTION, SOLUTION INTRAVENOUS at 16:15

## 2020-03-22 RX ADMIN — LACTULOSE SCH GM: 20 SOLUTION ORAL at 16:14

## 2020-03-22 RX ADMIN — TAMSULOSIN HYDROCHLORIDE SCH MG: 0.4 CAPSULE ORAL at 08:21

## 2020-03-22 RX ADMIN — LEVETIRACETAM SCH MG: 100 SOLUTION ORAL at 00:22

## 2020-03-22 RX ADMIN — LACTULOSE SCH GM: 20 SOLUTION ORAL at 10:48

## 2020-03-22 RX ADMIN — METRONIDAZOLE SCH MLS/HR: 500 INJECTION, SOLUTION INTRAVENOUS at 14:53

## 2020-03-22 RX ADMIN — LEVETIRACETAM SCH MG: 100 SOLUTION ORAL at 16:14

## 2020-03-22 RX ADMIN — LACTOBACILLUS ACIDOPHILUS / LACTOBACILLUS BULGARICUS SCH EACH: 100 MILLION CFU STRENGTH GRANULES at 08:21

## 2020-03-22 RX ADMIN — LACOSAMIDE SCH MLS/HR: 10 INJECTION INTRAVENOUS at 21:20

## 2020-03-22 RX ADMIN — ASPIRIN SCH MG: 325 TABLET ORAL at 11:29

## 2020-03-22 RX ADMIN — CEFAZOLIN SCH MLS/HR: 330 INJECTION, POWDER, FOR SOLUTION INTRAMUSCULAR; INTRAVENOUS at 21:37

## 2020-03-22 RX ADMIN — HYDROMORPHONE HYDROCHLORIDE PRN MG: 1 INJECTION, SOLUTION INTRAMUSCULAR; INTRAVENOUS; SUBCUTANEOUS at 08:27

## 2020-03-22 NOTE — PN
PROGRESS NOTE



DATE OF SERVICE:

03/22/2020.



REASON FOR FOLLOWUP:

1. VRE urinary tract infection.

2. Aspiration pneumonia.



INTERVAL HISTORY:

The patient is currently afebrile.  Patient has been breathing comfortably.  No further

nausea or vomiting has been reported or any diarrhea.  Patient still unable to provide

any history.



PHYSICAL EXAMINATION:

Blood pressure 85/55 with a pulse of 75.  Temperature is 97.5.  He is 99% on room air.

General description is a middle-aged male lying in bed in no distress.  Respiratory

system: Unlabored breathing.  Decreased breath sounds in the bases.  No wheeze.  Heart

S1, S2.  Regular rate and rhythm.  Abdomen soft, no tenderness.



LABS:

Repeat urine culture so far negative.



DIAGNOSTIC IMPRESSION AND PLAN:

1. Patient admitted to hospital with vomiting with concern for aspiration pneumonitis,

    in this patient who received about 7 days of Levaquin and Flagyl that can be

    discontinued on discharge.

2. Patient with VRE urinary tract infection adequately treated.  Sputum culture has

    been negative.  Discontinue the vancomycin after tomorrow's dose.





MMODL / IJN: [id number] / Job#: [id number]

## 2020-03-22 NOTE — P.PN
Progress Note - Text


Progress Note Date: 03/22/20





The patient is resting comfortably his bed.  He has had bowel movements on 

Friday however he has none yesterday.





On exam is lesser stable.  His abdomen soft.





Patient will resume tube feeds.  We will add lactulose 30 mL by mouth 3 times a 

day.  He'll be observed.

## 2020-03-22 NOTE — P.PN
Subjective





This is a pleasant 30 years old male with past medical history of developmental 

delay, autism, seizure disorder, status post vagal neurostimulator, left 

ureteral stent, presents this signs symptoms of bowel obstruction secondary to 

fecal impaction, surgeries been evaluated the patient GoLYTELY and has been 

tried with no much success and enema with only moderate stool, and surgery team 

recommended CAT scan of the abdomen and pelvis today.


Showing fecal impaction with possible small bowel dilatation suggestive of 

colonic ileus, possible cystitis


Patient is nothing by mouth and hold tube feeds, hemodynamically stable, blood 

pressure is 94/55, which is similar to last few days on the presentation.  Low 

potassium is been replaced, WBC is normal 5.5K, urine cultures, group D 

enterococcus





03/19/2020


Patient is lying comfortable in bed, no distress, mother at bedside, patient 

didn't have bowel movement since yesterday, no abdominal tenderness or 

significant pain, the mother does not think he needs abdominal pain medication 

for now.  He got several laxative for his fecal impaction contributing to his 

bowel obstruction/ileus.  His hemoglobin is stable at 11.2.  No other episodes 

of GI bleed.  Patient remains on Levaquin and Flagyl for his infection, possible

pneumonia.


Patient mother states that he has history of bilateral hydronephrosis and he has

recent procedure done by Dr. Deras recently including transurethral incision of

the prostate to help him urinating, repeat imaging showed persistent 

hydronephrosis.  [place] catheter was placed when he came to the hospital.  We'll 

ask for urology reevaluation.  Low potassium replaced.


Surgical team recommended CAT scan of the abdomen and L this with rectal 

contrast which is pending





03/20/2020


Patient is seen and evaluated in follow-up today sleeping and appears to be in 

no acute distress.  Patient does have a trach collar and is currently on room 

air.  Per nursing staff patient had 4 large bowel movements yesterday but CT of 

the abdomen continues to show a narrowing of the redundant sigmoid colon midline

that could possibly relate to stricture, spasm, or possible neoplasm although 

unlikely, severe left and moderate right hydronephrosis and urinary bladder 

circumferential thickening.  Patient is being followed by urology and sees them 

in the outpatient setting as well.  Patient continues to have an indwelling 

[place] catheter and will continue at this time.  Patient remains on IV 

antibiotics in the form of daptomycin, Levaquin, and Flagyl and will continue at

this time.  Infectious disease is following.  Patient is also being evaluated by

surgery and will continue with GoLYTELY today.  Will continue to monitor 

closely.





03/21/2020


Patient is doing better as he starts having some bowel movement with GoLYTELY.  

Repeat CAT scan showed no obstruction and surgery consult on the case closely.  

On [place] catheter.  PEG tube is on hold.  Tracheostomy is in a Place.  No se

izure no other active issues.  Discussed with staff





03/22/2020


Patient is comfortable in bed, he has small bowel movement today, he has 

significant bowel movements yesterday while on GoLYTELY.  Surgical service R 

following the case closely and there started to his PEG tube feeding and add 

lactulose.  Vitals stable, labs no change, low potassium replaced


Decreased normal saline to 40 mL per hour








Objective





- Vital Signs


Vital signs: 


                                   Vital Signs











Temp  97.1 F L  03/22/20 07:00


 


Pulse  76   03/22/20 08:48


 


Resp  16   03/22/20 07:00


 


BP  93/58   03/22/20 07:00


 


Pulse Ox  99   03/22/20 07:00








                                 Intake & Output











 03/21/20 03/22/20 03/22/20





 18:59 06:59 18:59


 


Intake Total 500 100 425


 


Output Total 2730 1800 


 


Balance -2230 -1700 425


 


Intake:   


 


  Intake, IV Titration  100 425





  Amount   


 


    Lacosamide  mg In   100





    Sodium Chloride 0.9% 50   





    ml @ 100 mls/hr IVPB BID   





    [facility] Rx#:[id number]   


 


    Sodium Chloride 0.9% 1,   225





    000 ml @ 75 mls/hr IV .   





    [id number] [facility] Rx#:[id number]   


 


    metroNIDAZOLE-NS   100 100





    mg In Saline 1 100ml.bag   





    @ 100 mls/hr IVPB Q8HR   





    [facility] Rx#:[id number]   


 


  Other 500  


 


Output:   


 


  Urine 2730 1800 


 


    Uretheral ([place]) 1600 900 


 


Other:   


 


  Voiding Method Indwelling Catheter Indwelling Catheter Indwelling Catheter














- Exam





-GENERAL: Average for age, no respiratory distress


HEENT: Pupils are round and equally reacting to light. EOMI. No scleral icterus.

No conjunctival pallor. Normocephalic, atraumatic. No pharyngeal erythema. No 

thyromegaly. 


CARDIOVASCULAR: S1 and S2 present. No murmurs, rubs, or gallops. 


PULMONARY: Chest is clear to auscultation, no wheezing or crackles. 


-ABDOMEN: Soft, nontender, nondistended, normoactive bowel sounds. No palpable o

rganomegaly.  PEG tube is in a Place


MUSCULOSKELETAL: No joint swelling or deformity. 


EXTREMITIES: No cyanosis, clubbing, or pedal edema. 


NEUROLOGICAL: Gross neurological examination did not reveal any focal deficits. 


SKIN: No rashes. no petechiae.





- Labs


CBC & Chem 7: 


                                 03/22/20 06:31





                                 03/22/20 06:31


Labs: 


                  Abnormal Lab Results - Last 24 Hours (Table)











  03/22/20 03/22/20 Range/Units





  06:31 06:31 


 


RBC  3.95 L   (4.30-5.90)  m/uL


 


Hct  38.5 L   (39.0-53.0)  %


 


Lymphocytes #  0.9 L   (1.0-4.8)  k/uL


 


Potassium   3.3 L  (3.5-5.1)  mmol/L


 


BUN   3 L  (9-20)  mg/dL








                      Microbiology - Last 24 Hours (Table)











 03/20/20 23:10 Urine Culture - Final





 Urine,Voided 


 


 03/15/20 12:24 Blood Culture - Final





 Blood    No Growth after 144 hours














Assessment and Plan


Assessment: 





Bowel obstruction secondary to fecal impaction


Possible aspiration pneumonia


Possible upper GI bleed


Bilateral hydronephrosis, he follows up with Dr. Malik


Suspicion for acute urinary tract infection and acute cystitis


Developmental delay status post Todd and tracheostomy


History of seizure





Plan: 








This is a pleasant 30 years old male who presents with bowel obstruction, 

possible aspiration pneumonia and UTI and GI bleed.  Patient is followed by 

several consult including surgery, gastroenterology, and infectious disease.  

resume tube feeding by surgery team.  Patient currently on antibiotics as per ID

recommendation.  Continue with gentle hydration.  Follow-up culture results.  

Consult urology for his hydronephrosis


Labs and medication were reviewed..  Continue same treatment.  Continue with 

symptomatic treatment.  Resume home medication.  Monitor lytes and vitals.  DVT 

and GI prophylaxis.  Further recommendations of the clinical course of the 

patient


DVT prophylaxis: No heparin in view of possible GI bleed


GI Prophylaxis: Pepcid


PT/OT: Pending


Prognosis is guarded

## 2020-03-22 NOTE — P.PN
Subjective


Progress Note Date: 03/22/20


Principal diagnosis: 


Acute hypoxic respiratory failure





Bilateral aspiration pneumonia





Bowel obstruction likely due to retained fecal material





Developmentally delayed





Status post chronic Peg tube and tracheostomy





Severe degree of seizure disorder





Past history of respiratory failure requiring vent support





03/22/2020, patient seen eval examined during the rounds remains on trach and 

PEG respiratory status stable breathing no obvious cough or shortness of breath 

noted





03/21/2020, patient seen eval examined during the rounds labs reviewed 

medications reviewed, urine culture is positive for Enterococcus faecalis VRE, 

respiratory standpoint doing well mild coarse breath sounds present at overall 

on room air saturation is 95-96% tolerating antibiotics well, noted is made of 

the abnormal computed tomography scan suggesting of narrowing and sigmoid colon 

level likely spasm or stricture versus neoplasm, along with bilateral 

hydronephrosis and thickening of the bladder urology and general surgery has 

been following





03/20/2020, patient remains on trach collar without and supple dependent and 

oxygen at room air, oxygen saturation has been stable, afebrile, 93% room air, 

patient getting therapy for aspiration pneumonia





03/19/2020, patient seen eval examined during the rounds labs reviewed 

medications reviewed, as per request of the family trach has been evaluated, 

care plan discussed with the respiratory therapist as well, tracheostomy stoma 

appears to be stable inner cannula clean, no evidence of infection, no need to 

change tracheostomy





03/18/2020, patient seen and evaluated examined during the rounds overall 

remains stable pulmonary standpoint remains on antibiotics for aspiration 

pneumonia congestion is present, computed tomography scan of the abdomen 

revealed a large hiatal hernia, PEG tube has been stable,





03/17/2020, patient seen and evaluated examined overall no significant change 

and no cough or congestion is present no more episodes of vomiting has been 

noted patient is getting therapy for pneumonia ID service has been following 

respiratory status remains stable





This is a 30-year-old status post trach and PEG due to developmental delay, 

patient has a history of chronic seizure disorder, patient's PEG tube has been 

recently changed about 2 months ago developed problems associated with severe 

constipation and vomiting and bluish blood-tinged secretions coming out through 

the PEG tube came into the hospital for further evaluation suspicion of 

pneumonia, chest x-ray revealed bilateral lower lobe infiltrate consistent with 

aspiration pneumonia patient is currently getting broad-spectrum antibiotics 

finally he has a bowel movement is abdomen is more softer now, patient also has 

a computed tomography scan of the abdominal pelvis which revealed dilated loops 

of bowel and colon








Objective





- Vital Signs


Vital signs: 


                                   Vital Signs











Temp  97.1 F L  03/22/20 07:00


 


Pulse  76   03/22/20 08:48


 


Resp  16   03/22/20 07:00


 


BP  93/58   03/22/20 07:00


 


Pulse Ox  99   03/22/20 07:00








                                 Intake & Output











 03/21/20 03/22/20 03/22/20





 18:59 06:59 18:59


 


Intake Total 500 100 425


 


Output Total 2730 1800 


 


Balance -2230 -1700 425


 


Intake:   


 


  Intake, IV Titration  100 425





  Amount   


 


    Lacosamide  mg In   100





    Sodium Chloride 0.9% 50   





    ml @ 100 mls/hr IVPB BID   





    [facility] Rx#:[id number]   


 


    Sodium Chloride 0.9% 1,   225





    000 ml @ 75 mls/hr IV .   





    [id number] [facility] Rx#:[id number]   


 


    metroNIDAZOLE-NS   100 100





    mg In Saline 1 100ml.bag   





    @ 100 mls/hr IVPB Q8HR   





    [facility] Rx#:[id number]   


 


  Other 500  


 


Output:   


 


  Urine 2730 1800 


 


    Uretheral ([place]) 1600 900 


 


Other:   


 


  Voiding Method Indwelling Catheter Indwelling Catheter Indwelling Catheter














- Exam


- Constitutional


General appearance: average body habitus, disheveled, no acute distress





- EENT


Eyes: EOMI, PERRLA


Ears: bilateral: normal





- Neck


Neck: normal ROM


Carotids: bilateral: upstroke normal





- Respiratory


Respiratory: bilateral: CTA, diminished, negative: dullness, rales, rhonchi





- Cardiovascular


Rhythm: regular


Heart sounds: normal: S1, S2





- Gastrointestinal


General gastrointestinal: decreased bowel sounds





- Musculoskeletal


Musculoskeletal: generalized weakness, strength equal bilaterally











- Labs


CBC & Chem 7: 


                                 03/22/20 06:31





                                 03/22/20 06:31


Labs: 


                  Abnormal Lab Results - Last 24 Hours (Table)











  03/22/20 03/22/20 Range/Units





  06:31 06:31 


 


RBC  3.95 L   (4.30-5.90)  m/uL


 


Hct  38.5 L   (39.0-53.0)  %


 


Lymphocytes #  0.9 L   (1.0-4.8)  k/uL


 


Potassium   3.3 L  (3.5-5.1)  mmol/L


 


BUN   3 L  (9-20)  mg/dL








                      Microbiology - Last 24 Hours (Table)











 03/15/20 12:24 Blood Culture - Final





 Blood    No Growth after 144 hours


 


 03/20/20 23:10 Urine Culture - Preliminary





 Urine,Voided 














Assessment and Plan


Assessment: 





Acute hypoxic respiratory failure, status post tract





Bilateral aspiration pneumonia





Bowel obstruction at sigmoid colon level stricture/spasm/neoplasm





Bilateral hydronephrosis with bladder wall thickening





Developmentally delayed





Status post chronic Peg tube and tracheostomy





Severe degree of seizure disorder





Past history of respiratory failure requiring vent support


Plan: 





Maintain trach keep saturation over 92% supplemental oxygen as needed, so far 

does not feed any oxygen





Pulmonary toilet





Respiratory to suction as needed





Seizure precautions





Nothing by mouth for now





Hold tube feed





Broad-spectrum antibiotics for bilateral aspiration pneumonia





Further recommendations pending plan of care as per clinical response of the 

patient





Further evaluation by urology and general surgery for bilateral hydronephrosis 

and bowel obstruction in progress





Once they are cleared from surgical and urological standpoint can be discharged 

home from pulmonary critical care standpoint


Time with Patient: Greater than 30

## 2020-03-23 LAB
ANION GAP SERPL CALC-SCNC: 8 MMOL/L
BUN SERPL-SCNC: 3 MG/DL (ref 9–20)
CALCIUM SPEC-MCNC: 8.7 MG/DL (ref 8.4–10.2)
CHLORIDE SERPL-SCNC: 110 MMOL/L (ref 98–107)
CO2 SERPL-SCNC: 20 MMOL/L (ref 22–30)
GLUCOSE SERPL-MCNC: 77 MG/DL (ref 74–99)
MAGNESIUM SPEC-SCNC: 1.7 MG/DL (ref 1.6–2.3)
POTASSIUM SERPL-SCNC: 3.5 MMOL/L (ref 3.5–5.1)
SODIUM SERPL-SCNC: 138 MMOL/L (ref 137–145)

## 2020-03-23 RX ADMIN — HEPARIN SODIUM SCH UNIT: 5000 INJECTION, SOLUTION INTRAVENOUS; SUBCUTANEOUS at 10:55

## 2020-03-23 RX ADMIN — LEVETIRACETAM SCH MG: 100 SOLUTION ORAL at 10:56

## 2020-03-23 RX ADMIN — ASPIRIN SCH MG: 325 TABLET ORAL at 00:03

## 2020-03-23 RX ADMIN — LACTULOSE SCH GM: 20 SOLUTION ORAL at 16:13

## 2020-03-23 RX ADMIN — METRONIDAZOLE SCH MLS/HR: 500 INJECTION, SOLUTION INTRAVENOUS at 00:02

## 2020-03-23 RX ADMIN — IPRATROPIUM BROMIDE AND ALBUTEROL SULFATE SCH ML: .5; 3 SOLUTION RESPIRATORY (INHALATION) at 21:15

## 2020-03-23 RX ADMIN — METOCLOPRAMIDE SCH MG: 5 INJECTION, SOLUTION INTRAMUSCULAR; INTRAVENOUS at 05:19

## 2020-03-23 RX ADMIN — LACOSAMIDE SCH MLS/HR: 10 INJECTION INTRAVENOUS at 10:55

## 2020-03-23 RX ADMIN — ASPIRIN SCH MG: 325 TABLET ORAL at 12:13

## 2020-03-23 RX ADMIN — LEVETIRACETAM SCH MG: 100 SOLUTION ORAL at 16:14

## 2020-03-23 RX ADMIN — IPRATROPIUM BROMIDE AND ALBUTEROL SULFATE SCH ML: .5; 3 SOLUTION RESPIRATORY (INHALATION) at 11:52

## 2020-03-23 RX ADMIN — SCOPALAMINE SCH PATCH: 1 PATCH, EXTENDED RELEASE TRANSDERMAL at 10:56

## 2020-03-23 RX ADMIN — METOCLOPRAMIDE SCH MG: 10 TABLET ORAL at 18:42

## 2020-03-23 RX ADMIN — PANTOPRAZOLE SODIUM SCH: 40 GRANULE, DELAYED RELEASE ORAL at 16:08

## 2020-03-23 RX ADMIN — LACTULOSE SCH GM: 20 SOLUTION ORAL at 23:16

## 2020-03-23 RX ADMIN — LACOSAMIDE SCH MG: 50 TABLET, FILM COATED ORAL at 23:17

## 2020-03-23 RX ADMIN — ASPIRIN SCH MG: 325 TABLET ORAL at 23:18

## 2020-03-23 RX ADMIN — CEFAZOLIN SCH: 330 INJECTION, POWDER, FOR SOLUTION INTRAMUSCULAR; INTRAVENOUS at 22:30

## 2020-03-23 RX ADMIN — LACOSAMIDE SCH MG: 50 TABLET, FILM COATED ORAL at 18:41

## 2020-03-23 RX ADMIN — LEVETIRACETAM SCH MG: 100 SOLUTION ORAL at 23:17

## 2020-03-23 RX ADMIN — IPRATROPIUM BROMIDE AND ALBUTEROL SULFATE SCH ML: .5; 3 SOLUTION RESPIRATORY (INHALATION) at 09:12

## 2020-03-23 RX ADMIN — PANTOPRAZOLE SODIUM SCH MG: 40 INJECTION, POWDER, FOR SOLUTION INTRAVENOUS at 10:54

## 2020-03-23 RX ADMIN — METOCLOPRAMIDE SCH MG: 5 INJECTION, SOLUTION INTRAMUSCULAR; INTRAVENOUS at 00:04

## 2020-03-23 RX ADMIN — TAMSULOSIN HYDROCHLORIDE SCH MG: 0.4 CAPSULE ORAL at 10:55

## 2020-03-23 RX ADMIN — HYDROMORPHONE HYDROCHLORIDE PRN MG: 1 INJECTION, SOLUTION INTRAMUSCULAR; INTRAVENOUS; SUBCUTANEOUS at 00:36

## 2020-03-23 RX ADMIN — LEVETIRACETAM SCH MG: 100 SOLUTION ORAL at 00:03

## 2020-03-23 RX ADMIN — LACTULOSE SCH GM: 20 SOLUTION ORAL at 10:54

## 2020-03-23 RX ADMIN — LACOSAMIDE SCH MG: 50 TABLET, FILM COATED ORAL at 12:11

## 2020-03-23 RX ADMIN — HEPARIN SODIUM SCH UNIT: 5000 INJECTION, SOLUTION INTRAVENOUS; SUBCUTANEOUS at 23:16

## 2020-03-23 RX ADMIN — METOCLOPRAMIDE SCH MG: 10 TABLET ORAL at 12:46

## 2020-03-23 RX ADMIN — FOLIC ACID SCH MG: 1 TABLET ORAL at 10:55

## 2020-03-23 RX ADMIN — METOCLOPRAMIDE SCH: 5 INJECTION, SOLUTION INTRAMUSCULAR; INTRAVENOUS at 13:08

## 2020-03-23 NOTE — P.PN
Subjective


Progress Note Date: 03/23/20


Principal diagnosis: 


Acute hypoxic respiratory failure





Bilateral aspiration pneumonia





Bowel obstruction likely due to retained fecal material





Developmentally delayed





Status post chronic Peg tube and tracheostomy





Severe degree of seizure disorder





Past history of respiratory failure requiring vent support











03/23/2020, patient seen eval reexamined during the rounds labs reviewed 

medications reviewed, care plan discussed with primary care, stable from 

pulmonary standpoint for discharge, patient is still have 2 more days of 

daptomycin left 





 03/22/2020, patient seen eval examined during the rounds remains on trach and 

PEG respiratory status stable breathing no obvious cough or shortness of breath 

noted





03/21/2020, patient seen eval examined during the rounds labs reviewed 

medications reviewed, urine culture is positive for Enterococcus faecalis VRE, 

respiratory standpoint doing well mild coarse breath sounds present at overall 

on room air saturation is 95-96% tolerating antibiotics well, noted is made of 

the abnormal computed tomography scan suggesting of narrowing and sigmoid colon 

level likely spasm or stricture versus neoplasm, along with bilateral hydron

ephrosis and thickening of the bladder urology and general surgery has been 

following





03/20/2020, patient remains on trach collar without and supple dependent and 

oxygen at room air, oxygen saturation has been stable, afebrile, 93% room air, 

patient getting therapy for aspiration pneumonia





03/19/2020, patient seen eval examined during the rounds labs reviewed 

medications reviewed, as per request of the family trach has been evaluated, 

care plan discussed with the respiratory therapist as well, tracheostomy stoma 

appears to be stable inner cannula clean, no evidence of infection, no need to c

hange tracheostomy





03/18/2020, patient seen and evaluated examined during the rounds overall remain

s stable pulmonary standpoint remains on antibiotics for aspiration pneumonia 

congestion is present, computed tomography scan of the abdomen revealed a large 

hiatal hernia, PEG tube has been stable,





03/17/2020, patient seen and evaluated examined overall no significant change 

and no cough or congestion is present no more episodes of vomiting has been 

noted patient is getting therapy for pneumonia ID service has been following 

respiratory status remains stable





This is a 30-year-old status post trach and PEG due to developmental delay, 

patient has a history of chronic seizure disorder, patient's PEG tube has been 

recently changed about 2 months ago developed problems associated with severe c

onstipation and vomiting and bluish blood-tinged secretions coming out through 

the PEG tube came into the hospital for further evaluation suspicion of 

pneumonia, chest x-ray revealed bilateral lower lobe infiltrate consistent with 

aspiration pneumonia patient is currently getting broad-spectrum antibiotics 

finally he has a bowel movement is abdomen is more softer now, patient also has 

a computed tomography scan of the abdominal pelvis which revealed dilated loops 

of bowel and colon








Objective





- Vital Signs


Vital signs: 


                                   Vital Signs











Temp  98.0 F   03/23/20 07:00


 


Pulse  74   03/23/20 09:26


 


Resp  17   03/23/20 07:00


 


BP  102/65   03/23/20 07:00


 


Pulse Ox  97   03/23/20 07:00








                                 Intake & Output











 03/22/20 03/23/20 03/23/20





 18:59 06:59 18:59


 


Intake Total 425 360 


 


Output Total  1250 375


 


Balance 425 -890 -375


 


Weight 74 kg  


 


Intake:   


 


  Intake, IV Titration 425  





  Amount   


 


    Lacosamide  mg In 100  





    Sodium Chloride 0.9% 50   





    ml @ 100 mls/hr IVPB BID   





    [facility] Rx#:[id number]   


 


    Sodium Chloride 0.9% 1, 225  





    000 ml @ 40 mls/hr IV .   





    Q24H [facility] Rx#:[id number]   


 


    metroNIDAZOLE-NS  100  





    mg In Saline 1 100ml.bag   





    @ 100 mls/hr IVPB Q8HR   





    [facility] Rx#:[id number]   


 


  Tube Feeding  360 


 


Output:   


 


  Urine  1250 375


 


Other:   


 


  Voiding Method Indwelling Catheter Indwelling Catheter 














- Exam


- Constitutional


General appearance: average body habitus, disheveled, no acute distress





- EENT


Eyes: EOMI, PERRLA


Ears: bilateral: normal





- Neck


Neck: normal ROM


Carotids: bilateral: upstroke normal





- Respiratory


Respiratory: bilateral: CTA, diminished, negative: dullness, rales, rhonchi





- Cardiovascular


Rhythm: regular


Heart sounds: normal: S1, S2





- Gastrointestinal


General gastrointestinal: decreased bowel sounds





- Musculoskeletal


Musculoskeletal: generalized weakness, strength equal bilaterally











- Labs


CBC & Chem 7: 


                                 03/22/20 06:31





                                 03/23/20 06:34


Labs: 


                  Abnormal Lab Results - Last 24 Hours (Table)











  03/23/20 Range/Units





  06:34 


 


Chloride  110 H  ()  mmol/L


 


Carbon Dioxide  20 L  (22-30)  mmol/L


 


BUN  3 L  (9-20)  mg/dL








                      Microbiology - Last 24 Hours (Table)











 03/20/20 23:10 Urine Culture - Final





 Urine,Voided 














Assessment and Plan


Assessment: 


VRE urinary tract infection


Acute hypoxic respiratory failure, status post tract





Bilateral aspiration pneumonia





Bowel obstruction at sigmoid colon level stricture/spasm/neoplasm





Bilateral hydronephrosis with bladder wall thickening





Developmentally delayed





Status post chronic Peg tube and tracheostomy





Severe degree of seizure disorder





Past history of respiratory failure requiring vent support


Plan: 





Maintain trach keep saturation over 92% supplemental oxygen as needed, so far 

does not feed any oxygen





Pulmonary toilet





Respiratory to suction as needed





Seizure precautions





Nothing by mouth for now





Hold tube feed





Broad-spectrum antibiotics for bilateral aspiration pneumonia and VRE UTI





Further recommendations pending plan of care as per clinical response of the 

patient





Further evaluation by urology and general surgery for bilateral hydronephrosis 

and bowel obstruction in progress





Once they are cleared from surgical and urological standpoint can be discharged 

home from pulmonary critical care standpoint


Time with Patient: Greater than 30

## 2020-03-23 NOTE — PN
PROGRESS NOTE



DATE OF SERVICE:

03/23/2020.



REASON FOR FOLLOWUP:

1. Aspiration pneumonitis.

2. VRE UTI.



INTERVAL HISTORY:

The patient is currently afebrile.  The patient has been breathing comfortably. 
No

further vomiting has been reported.  Did have a bowel movement.  Patient is not 
able to

provide any history.



PHYSICAL EXAMINATION:

Blood pressure 97/62 with a pulse of 68, temperature 98. He is 97% on room air.

General description is a middle-aged male lying in bed in no distress.

RESPIRATORY SYSTEM: Unlabored breathing with decreased breath sounds at the 
base. No

wheeze.

HEART: S1, S2.  Regular rate and rhythm.

ABDOMEN: Soft. No tenderness.



LABS:

BUN of 3, creatinine 0.68.



DIAGNOSTIC IMPRESSION AND PLAN:

1. Patient admitted with aspiration pneumonia.  Patient is currently covered 
with

    Levaquin and Flagyl.  May switch to p.o. with no IV access  hold on midline

2. Patient with vancomycin-resistant Enterococcus urinary tract infection, 
adequately

    treated. Daptomycin can be discontinued.





MMODL / IJN: [id number] / Job#: [id number]

MTDD

## 2020-03-23 NOTE — P.PN
Subjective





This is a pleasant 30 years old male with past medical history of developmental 

delay, autism, seizure disorder, status post vagal neurostimulator, left 

ureteral stent, presents this signs symptoms of bowel obstruction secondary to 

fecal impaction, surgeries been evaluated the patient GoLYTELY and has been 

tried with no much success and enema with only moderate stool, and surgery team 

recommended CAT scan of the abdomen and pelvis today.


Showing fecal impaction with possible small bowel dilatation suggestive of 

colonic ileus, possible cystitis


Patient is nothing by mouth and hold tube feeds, hemodynamically stable, blood 

pressure is 94/55, which is similar to last few days on the presentation.  Low 

potassium is been replaced, WBC is normal 5.5K, urine cultures, group D 

enterococcus





03/19/2020


Patient is lying comfortable in bed, no distress, mother at bedside, patient 

didn't have bowel movement since yesterday, no abdominal tenderness or 

significant pain, the mother does not think he needs abdominal pain medication 

for now.  He got several laxative for his fecal impaction contributing to his 

bowel obstruction/ileus.  His hemoglobin is stable at 11.2.  No other episodes 

of GI bleed.  Patient remains on Levaquin and Flagyl for his infection, possible

pneumonia.


Patient mother states that he has history of bilateral hydronephrosis and he has

recent procedure done by Dr. Deras recently including transurethral incision of

the prostate to help him urinating, repeat imaging showed persistent 

hydronephrosis.  [place] catheter was placed when he came to the hospital.  We'll 

ask for urology reevaluation.  Low potassium replaced.


Surgical team recommended CAT scan of the abdomen and L this with rectal 

contrast which is pending





03/20/2020


Patient is seen and evaluated in follow-up today sleeping and appears to be in 

no acute distress.  Patient does have a trach collar and is currently on room 

air.  Per nursing staff patient had 4 large bowel movements yesterday but CT of 

the abdomen continues to show a narrowing of the redundant sigmoid colon midline

that could possibly relate to stricture, spasm, or possible neoplasm although 

unlikely, severe left and moderate right hydronephrosis and urinary bladder 

circumferential thickening.  Patient is being followed by urology and sees them 

in the outpatient setting as well.  Patient continues to have an indwelling 

Bar[place]ona catheter and will continue at this time.  Patient remains on IV 

antibiotics in the form of daptomycin, Levaquin, and Flagyl and will continue at

this time.  Infectious disease is following.  Patient is also being evaluated by

surgery and will continue with GoLYTELY today.  Will continue to monitor 

closely.





03/21/2020


Patient is doing better as he starts having some bowel movement with GoLYTELY.  

Repeat CAT scan showed no obstruction and surgery consult on the case closely.  

On Bar[place]ona catheter.  PEG tube is on hold.  Tracheostomy is in a Place.  No se

izure no other active issues.  Discussed with staff





03/22/2020


Patient is comfortable in bed, he has small bowel movement today, he has 

significant bowel movements yesterday while on GoLYTELY.  Surgical service R 

following the case closely and there started to his PEG tube feeding and add 

lactulose.  Vitals stable, labs no change, low potassium replaced


Decreased normal saline to 40 mL per hour





03/23/2020


Patient looks comfortable, no distress, abdomen looks soft, he did not have 

bowel movement today after starting lactulose and tube feeding, as per staff he 

could not tolerate tube feeds at the usual rate, so they held it and started to 

gradually at lower rate.  IV site is infiltrated and pills can be restarted for 

him via PEG tube as at home, he remains on daptomycin, Levaquin and Flagyl as 

per ID team recommendation.creatinine normal at potassium was low normal 3.5, 

magnesium 1.7.  Continue Dilaudid as it contributes to his lazy bowel.  Deep 

monitoring for now





Objective





- Vital Signs


Vital signs: 


                                   Vital Signs











Temp  98.0 F   03/23/20 07:00


 


Pulse  80   03/23/20 12:04


 


Resp  17   03/23/20 07:00


 


BP  102/65   03/23/20 07:00


 


Pulse Ox  97   03/23/20 07:00








                                 Intake & Output











 03/22/20 03/23/20 03/23/20





 18:59 06:59 18:59


 


Intake Total 425 360 


 


Output Total  1250 375


 


Balance 425 -890 -375


 


Weight 74 kg  


 


Intake:   


 


  Intake, IV Titration 425  





  Amount   


 


    Lacosamide  mg In 100  





    Sodium Chloride 0.9% 50   





    ml @ 100 mls/hr IVPB BID   





    OLEG Rx#:[id number]   


 


    Sodium Chloride 0.9% 1, 225  





    000 ml @ 40 mls/hr IV .   





    Q24H OLE[facility] Rx#:[id number]   


 


    metroNIDAZOLE-NS  100  





    mg In Saline 1 100ml.bag   





    @ 100 mls/hr IVPB Q8HR   





    OLEG Rx#:[id number]   


 


  Tube Feeding  360 


 


Output:   


 


  Urine  1250 375


 


Other:   


 


  Voiding Method Indwelling Catheter Indwelling Catheter 














- Exam





-GENERAL: Average for age, no respiratory distress


HEENT: Pupils are round and equally reacting to light. EOMI. No scleral icterus.

No conjunctival pallor. Normocephalic, atraumatic. No pharyngeal erythema. No 

thyromegaly. 


CARDIOVASCULAR: S1 and S2 present. No murmurs, rubs, or gallops. 


PULMONARY: Chest is clear to auscultation, no wheezing or crackles. 


-ABDOMEN: Soft, nontender, nondistended, normoactive bowel sounds. No palpable 

organomegaly.  PEG tube is in a Place


MUSCULOSKELETAL: No joint swelling or deformity. 


EXTREMITIES: No cyanosis, clubbing, or pedal edema. 


NEUROLOGICAL: Gross neurological examination did not reveal any focal deficits. 


SKIN: No rashes. no petechiae.





- Labs


CBC & Chem 7: 


                                 03/22/20 06:31





                                 03/23/20 06:34


Labs: 


                  Abnormal Lab Results - Last 24 Hours (Table)











  03/23/20 Range/Units





  06:34 


 


Chloride  110 H  ()  mmol/L


 


Carbon Dioxide  20 L  (22-30)  mmol/L


 


BUN  3 L  (9-20)  mg/dL








                      Microbiology - Last 24 Hours (Table)











 03/20/20 23:10 Urine Culture - Final





 Urine,Voided 














Assessment and Plan


Assessment: 





Bowel obstruction secondary to fecal impaction


Possible aspiration pneumonia


Possible upper GI bleed


Bilateral hydronephrosis, he follows up with Dr. Malik


Suspicion for acute urinary tract infection and acute cystitis


Developmental delay status post Todd and tracheostomy


History of seizure





Plan: 








This is a pleasant 30 years old male who presents with bowel obstruction, 

possible aspiration pneumonia and UTI and GI bleed.  Patient is followed by 

several consult including surgery, gastroenterology, and infectious disease.  

resume tube feeding by surgery team.  Patient currently on antibiotics as per ID

recommendation.  Continue with gentle hydration.  Follow-up culture results.  

Consult urology for his hydronephrosis


Labs and medication were reviewed..  Continue same treatment.  Continue with 

symptomatic treatment.  Resume home medication.  Monitor lytes and vitals.  DVT 

and GI prophylaxis.  Further recommendations of the clinical course of the 

patient


DVT prophylaxis: No heparin in view of possible GI bleed


GI Prophylaxis: Pepcid


PT/OT: Pending


Prognosis is guarded

## 2020-03-23 NOTE — P.PN
Subjective


Progress Note Date: 03/23/20





CHIEF COMPLAINT: bowel obstruction





HISTORY OF PRESENT ILLNESS: Patient examined at the bedside. Nursing reports 

tube feeding was disconnected by patient. No bowel movement thus far today. 





PHYSICAL EXAM: 


VITAL SIGNS: Reviewed.


GENERAL: Well-developed in no acute distress. 


HEENT:  Trach noted. No sclera icterus. Extraocular movements grossly intact.  

Moist buccal mucosa. Head is atraumatic, normocephalic. 


ABDOMEN:  Soft.  Nondistended. Nontender. PEG tube noted.


NEUROLOGIC: Nonverbal at baseline





ASSESSMENT: 


1.  Vomiting


2.  Megacolon, colonic ileus


3.  History of trach and PEG





PLAN: 


Resume tube feeds as tolerated


Continue current bowel regimen


Increase lactulose to 30gram TID


Discontinue Dilaudid





Nurse practitioner note has been reviewed by physician. Signing provider agrees 

with the documented findings, assessment, and plan of care. 








Objective





- Vital Signs


Vital signs: 


                                   Vital Signs











Temp  98.0 F   03/23/20 07:00


 


Pulse  74   03/23/20 09:26


 


Resp  17   03/23/20 07:00


 


BP  102/65   03/23/20 07:00


 


Pulse Ox  97   03/23/20 07:00








                                 Intake & Output











 03/22/20 03/23/20 03/23/20





 18:59 06:59 18:59


 


Intake Total 425 360 


 


Output Total  1250 


 


Balance 425 -890 


 


Weight 74 kg  


 


Intake:   


 


  Intake, IV Titration 425  





  Amount   


 


    Lacosamide  mg In 100  





    Sodium Chloride 0.9% 50   





    ml @ 100 mls/hr IVPB BID   





    OLEG Rx#:[id number]   


 


    Sodium Chloride 0.9% 1, 225  





    000 ml @ 40 mls/hr IV .   





    Q24H [facility] Rx#:[id number]   


 


    metroNIDAZOLE-NS  100  





    mg In Saline 1 100ml.bag   





    @ 100 mls/hr IVPB Q8HR   





    OLEG Rx#:[id number]   


 


  Tube Feeding  360 


 


Output:   


 


  Urine  1250 


 


Other:   


 


  Voiding Method Indwelling Catheter Indwelling Catheter 














- Labs


CBC & Chem 7: 


                                 03/22/20 06:31





                                 03/23/20 06:34


Labs: 


                  Abnormal Lab Results - Last 24 Hours (Table)











  03/23/20 Range/Units





  06:34 


 


Chloride  110 H  ()  mmol/L


 


Carbon Dioxide  20 L  (22-30)  mmol/L


 


BUN  3 L  (9-20)  mg/dL








                      Microbiology - Last 24 Hours (Table)











 03/20/20 23:10 Urine Culture - Final





 Urine,Voided

## 2020-03-24 VITALS — SYSTOLIC BLOOD PRESSURE: 103 MMHG | TEMPERATURE: 97.6 F | RESPIRATION RATE: 17 BRPM | DIASTOLIC BLOOD PRESSURE: 69 MMHG

## 2020-03-24 VITALS — HEART RATE: 70 BPM

## 2020-03-24 LAB
ANION GAP SERPL CALC-SCNC: 8 MMOL/L
BUN SERPL-SCNC: 4 MG/DL (ref 9–20)
CALCIUM SPEC-MCNC: 9.2 MG/DL (ref 8.4–10.2)
CHLORIDE SERPL-SCNC: 107 MMOL/L (ref 98–107)
CO2 SERPL-SCNC: 24 MMOL/L (ref 22–30)
GLUCOSE SERPL-MCNC: 82 MG/DL (ref 74–99)
MAGNESIUM SPEC-SCNC: 1.8 MG/DL (ref 1.6–2.3)
POTASSIUM SERPL-SCNC: 3.5 MMOL/L (ref 3.5–5.1)
SODIUM SERPL-SCNC: 139 MMOL/L (ref 137–145)

## 2020-03-24 RX ADMIN — LACOSAMIDE SCH MG: 50 TABLET, FILM COATED ORAL at 07:43

## 2020-03-24 RX ADMIN — ASPIRIN SCH MG: 325 TABLET ORAL at 12:48

## 2020-03-24 RX ADMIN — LEVETIRACETAM SCH MG: 100 SOLUTION ORAL at 07:44

## 2020-03-24 RX ADMIN — IPRATROPIUM BROMIDE AND ALBUTEROL SULFATE SCH ML: .5; 3 SOLUTION RESPIRATORY (INHALATION) at 08:41

## 2020-03-24 RX ADMIN — PANTOPRAZOLE SODIUM SCH MG: 40 GRANULE, DELAYED RELEASE ORAL at 07:45

## 2020-03-24 RX ADMIN — METOCLOPRAMIDE SCH MG: 10 TABLET ORAL at 12:47

## 2020-03-24 RX ADMIN — LACTULOSE SCH GM: 20 SOLUTION ORAL at 07:43

## 2020-03-24 RX ADMIN — METOCLOPRAMIDE SCH MG: 10 TABLET ORAL at 07:45

## 2020-03-24 RX ADMIN — TAMSULOSIN HYDROCHLORIDE SCH MG: 0.4 CAPSULE ORAL at 07:44

## 2020-03-24 RX ADMIN — HEPARIN SODIUM SCH UNIT: 5000 INJECTION, SOLUTION INTRAVENOUS; SUBCUTANEOUS at 07:43

## 2020-03-24 RX ADMIN — FOLIC ACID SCH MG: 1 TABLET ORAL at 07:44

## 2020-03-24 RX ADMIN — IPRATROPIUM BROMIDE AND ALBUTEROL SULFATE SCH ML: .5; 3 SOLUTION RESPIRATORY (INHALATION) at 12:17

## 2020-03-24 NOTE — P.PN
Subjective


Progress Note Date: 03/24/20


Principal diagnosis: 


Acute hypoxic respiratory failure





Bilateral aspiration pneumonia





Bowel obstruction likely due to retained fecal material





Developmentally delayed





Status post chronic Peg tube and tracheostomy





Severe degree of seizure disorder





Past history of respiratory failure requiring vent support





03/24/2020, overall not significantly changed remains afebrile respirations 

stable with stable saturation on the trach collar, ID recommendation noted plan 

is to switch to for PEG tube





03/23/2020, patient seen eval reexamined during the rounds labs reviewed 

medications reviewed, care plan discussed with primary care, stable from 

pulmonary standpoint for discharge, patient is still have 2 more days of 

daptomycin left 





 03/22/2020, patient seen eval examined during the rounds remains on trach and 

PEG respiratory status stable breathing no obvious cough or shortness of breath 

noted





03/21/2020, patient seen eval examined during the rounds labs reviewed medicatio

ns reviewed, urine culture is positive for Enterococcus faecalis VRE, 

respiratory standpoint doing well mild coarse breath sounds present at overall 

on room air saturation is 95-96% tolerating antibiotics well, noted is made of 

the abnormal computed tomography scan suggesting of narrowing and sigmoid colon 

level likely spasm or stricture versus neoplasm, along with bilateral 

hydronephrosis and thickening of the bladder urology and general surgery has 

been following





03/20/2020, patient remains on trach collar without and supple dependent and 

oxygen at room air, oxygen saturation has been stable, afebrile, 93% room air, 

patient getting therapy for aspiration pneumonia





03/19/2020, patient seen eval examined during the rounds labs reviewed 

medications reviewed, as per request of the family trach has been evaluated, 

care plan discussed with the respiratory therapist as well, tracheostomy stoma 

appears to be stable inner cannula clean, no evidence of infection, no need to 

change tracheostomy





03/18/2020, patient seen and evaluated examined during the rounds overall 

remains stable pulmonary standpoint remains on antibiotics for aspiration 

pneumonia congestion is present, computed tomography scan of the abdomen 

revealed a large hiatal hernia, PEG tube has been stable,





03/17/2020, patient seen and evaluated examined overall no significant change 

and no cough or congestion is present no more episodes of vomiting has been 

noted patient is getting therapy for pneumonia ID service has been following 

respiratory status remains stable





This is a 30-year-old status post trach and PEG due to developmental delay, 

patient has a history of chronic seizure disorder, patient's PEG tube has been 

recently changed about 2 months ago developed problems associated with severe 

constipation and vomiting and bluish blood-tinged secretions coming out through 

the PEG tube came into the hospital for further evaluation suspicion of 

pneumonia, chest x-ray revealed bilateral lower lobe infiltrate consistent with 

aspiration pneumonia patient is currently getting broad-spectrum antibiotics 

finally he has a bowel movement is abdomen is more softer now, patient also has 

a computed tomography scan of the abdominal pelvis which revealed dilated loops 

of bowel and colon








Objective





- Vital Signs


Vital signs: 


                                   Vital Signs











Temp  97.6 F   03/24/20 02:36


 


Pulse  72   03/24/20 08:52


 


Resp  17   03/24/20 02:36


 


BP  103/69   03/24/20 02:36


 


Pulse Ox  98   03/24/20 02:36








                                 Intake & Output











 03/23/20 03/24/20 03/24/20





 18:59 06:59 18:59


 


Output Total 675 500 


 


Balance -675 -500 


 


Weight 74 kg  


 


Output:   


 


  Urine 675 500 


 


Other:   


 


  Voiding Method Indwelling Catheter Indwelling Catheter 


 


  # Bowel Movements 1 1 














- Exam


- Constitutional


General appearance: average body habitus, disheveled, no acute distress





- EENT


Eyes: EOMI, PERRLA


Ears: bilateral: normal





- Neck


Neck: normal ROM


Carotids: bilateral: upstroke normal





- Respiratory


Respiratory: bilateral: CTA, diminished, negative: dullness, rales, rhonchi





- Cardiovascular


Rhythm: regular


Heart sounds: normal: S1, S2





- Gastrointestinal


General gastrointestinal: decreased bowel sounds





- Musculoskeletal


Musculoskeletal: generalized weakness, strength equal bilaterally











- Labs


CBC & Chem 7: 


                                 03/22/20 06:31





                                 03/24/20 06:03


Labs: 


                  Abnormal Lab Results - Last 24 Hours (Table)











  03/24/20 Range/Units





  06:03 


 


BUN  4 L  (9-20)  mg/dL














Assessment and Plan


Assessment: 


VRE urinary tract infection


Acute hypoxic respiratory failure, status post tract





Bilateral aspiration pneumonia





Bowel obstruction at sigmoid colon level stricture/spasm/neoplasm





Bilateral hydronephrosis with bladder wall thickening





Developmentally delayed





Status post chronic Peg tube and tracheostomy





Severe degree of seizure disorder





Past history of respiratory failure requiring vent support


Plan: 





Maintain trach keep saturation over 92% supplemental oxygen as needed, so far 

does not feed any oxygen





Pulmonary toilet





Respiratory to suction as needed





Seizure precautions





Nothing by mouth for now





Hold tube feed





Broad-spectrum antibiotics for bilateral aspiration pneumonia and VRE UTI





Further recommendations pending plan of care as per clinical response of the 

patient





Further evaluation by urology and general surgery for bilateral hydronephrosis 

and bowel obstruction in progress





Once they are cleared from surgical and urological standpoint can be discharged 

home from pulmonary critical care standpoint


Time with Patient: Greater than 30

## 2020-03-24 NOTE — P.DS
Providers


Date of admission: 


03/15/20 15:51





Attending physician: 


Mandeep Prater





Consults: 





                                        





03/15/20 15:46


Consult Physician Stat 


   Consulting Provider: Nilam Smith


   Consult Reason/Comments: gi bleed, bowel obstruction, colonic ileus


   Do you want consulting provider notified?: Yes





03/15/20 19:16


Consult Physician Routine 


   Consulting Provider: Jose Tolbert


   Consult Reason/Comments: pneumonia aspiration


   Do you want consulting provider notified?: Yes





03/16/20 12:12


Consult Physician Routine 


   Consulting Provider: Jaspal Mario


   Consult Reason/Comments: bowel obstruction


   Do you want consulting provider notified?: Yes





03/16/20 12:52


Consult Physician Urgent 


   Consulting Provider: Gentry Kingsley


   Consult Reason/Comments: pneumonia


   Do you want consulting provider notified?: Yes





03/18/20 19:48


Consult Physician Routine 


   Consulting Provider: Jayden Pinon


   Consult Reason/Comments: b/l hydronephrosis


   Do you want consulting provider notified?: Yes











Primary care physician: 


Geneva Hinojosa





[facility] Course: 





30 years old male with past medical history of developmental delay, autism, 

seizure disorder, status post vagal neurostimulator, left ureteral stent, 

presents this signs symptoms of bowel obstruction secondary to fecal impaction, 

surgeries been evaluated the patient GoLYTELY and has been tried with no much 

success and enema with only moderate stool, and surgery team recommended CAT 

scan of the abdomen and pelvis today.


Showing fecal impaction with possible small bowel dilatation suggestive of 

colonic ileus, possible cystitis


Patient is nothing by mouth and hold tube feeds, hemodynamically stable, blood 

pressure is 94/55, which is similar to last few days on the presentation.  Low 

potassium is been replaced, WBC is normal 5.5K, urine cultures, group D 

enterococcus





03/19/2020


Patient is lying comfortable in bed, no distress, mother at bedside, patient 

didn't have bowel movement since yesterday, no abdominal tenderness or 

significant pain, the mother does not think he needs abdominal pain medication 

for now.  He got several laxative for his fecal impaction contributing to his 

bowel obstruction/ileus.  His hemoglobin is stable at 11.2.  No other episodes 

of GI bleed.  Patient remains on Levaquin and Flagyl for his infection, possible

pneumonia.


Patient mother states that he has history of bilateral hydronephrosis and he has

recent procedure done by Dr. Deras recently including transurethral incision of

the prostate to help him urinating, repeat imaging showed persistent 

hydronephrosis.  Bar[place]ona catheter was placed when he came to the hospital.  We'll 

ask for urology reevaluation.  Low potassium replaced.


Surgical team recommended CAT scan of the abdomen and L this with rectal 

contrast which is pending





03/20/2020


Patient is seen and evaluated in follow-up today sleeping and appears to be in 

no acute distress.  Patient does have a trach collar and is currently on room 

air.  Per nursing staff patient had 4 large bowel movements yesterday but CT of 

the abdomen continues to show a narrowing of the redundant sigmoid colon midline

that could possibly relate to stricture, spasm, or possible neoplasm although 

unlikely, severe left and moderate right hydronephrosis and urinary bladder 

circumferential thickening.  Patient is being followed by urology and sees them 

in the outpatient setting as well.  Patient continues to have an indwelling 

Barahona catheter and will continue at this time.  Patient remains on IV 

antibiotics in the form of daptomycin, Levaquin, and Flagyl and will continue at

this time.  Infectious disease is following.  Patient is also being evaluated by

surgery and will continue with GoLYTELY today.  Will continue to monitor 

closely.





03/21/2020


Patient is doing better as he starts having some bowel movement with GoLYTELY.  

Repeat CAT scan showed no obstruction and surgery consult on the case closely.  

On Barahona catheter.  PEG tube is on hold.  Tracheostomy is in a Place.  No 

seizure no other active issues.  Discussed with staff





03/22/2020


Patient is comfortable in bed, he has small bowel movement today, he has 

significant bowel movements yesterday while on GoLYTELY.  Surgical service R 

following the case closely and there started to his PEG tube feeding and add 

lactulose.  Vitals stable, labs no change, low potassium replaced


Decreased normal saline to 40 mL per hour





03/23/2020


Patient looks comfortable, no distress, abdomen looks soft, he did not have 

bowel movement today after starting lactulose and tube feeding, as per staff he 

could not tolerate tube feeds at the usual rate, so they held it and started to 

gradually at lower rate.  IV site is infiltrated and pills can be restarted for 

him via PEG tube as at home, he remains on daptomycin, Levaquin and Flagyl as 

per ID team recommendation.creatinine normal at potassium was low normal 3.5, 

magnesium 1.7.  Continue Dilaudid as it contributes to his lazy bowel.  Deep 

monitoring for now





03/24/2020


Patient is being discharged today patient completed IV antibiotic therapy and 

infectious disease not recommending any more antibiotics patient will be 

discharged today.  Patient is also on scopolamine which is contributing to his 

urinary retention as well as constipation will cut down the dose of scopolamine 

and patient will be started on lactulose as needed basis for constipation








- Exam





-GENERAL: Average for age, no respiratory distress, Patient has cerebral palsy


HEENT: Pupils are round and equally reacting to light. EOMI. No scleral icterus.

No conjunctival pallor. Normocephalic, atraumatic. No pharyngeal erythema. No 

thyromegaly. 


CARDIOVASCULAR: S1 and S2 present. No murmurs, rubs, or gallops. 


PULMONARY: Chest is clear to auscultation, no wheezing or crackles. 


-ABDOMEN: Soft, nontender, nondistended, normoactive bowel sounds. No palpable 

organomegaly.  PEG tube is in a Place


MUSCULOSKELETAL: No joint swelling or deformity. 


EXTREMITIES: No cyanosis, clubbing, or pedal edema. 


NEUROLOGICAL: Gross neurological examination did not reveal any focal deficits. 


SKIN: No rashes. no petechiae.





Assessment and Plan


Assessment: 





Bowel obstruction secondary to fecal impaction


Possible aspiration pneumonia


Possible upper GI bleed


Bilateral hydronephrosis, he follows up with Dr. Malik


Suspicion for acute urinary tract infection and acute cystitis


Developmental delay status post Todd and tracheostomy


History of seizure





Patient Condition at Discharge: Stable





Plan - Discharge Summary


Discharge Rx Participant: Yes


New Discharge Prescriptions: 


New


   Lactulose [Cephulac] 30 gm PO TID PRN #30 dose


     PRN Reason: Constipation


   Scopolamine [Scopolamine 1 MG/72 HR patch] 1 patch TRANSDERM Q72H #10 patch





Continue


   Lacosamide [Vimpat] 200 mg PEG/G-TUBE TID@0000,0930,1700


   Clobazam [Onfi] 20 mg PEG/G-TUBE BID@0000,1200


   Vitamin B Complex 1 cap PEG/G-TUBE DAILY@0930


   Polyethylene Glycol 3350 [Miralax] 17 gm PEG/G-TUBE DAILY@0930


   Folic Acid 1 mg PEG/G-TUBE DAILY@0930


   Ascorbic Acid [Vitamin C] 500 mg PEG/G-TUBE BID@0930,1700


   levETIRAcetam [Keppra Oral Solution] 1,800 mg PEG/G-TUBE TID@0000,0930,1700


   clonazePAM [KlonoPIN] 1 mg PEG/G-TUBE TID@0000,0930,1700


   Albuterol Nebulized [Ventolin Nebulized] 5 mg INHALATION Q12H


   L.acidoph,Paracasei, B.lactis [Probiotic] 1 cap PEG/G-TUBE Q72H


   Tamsulosin [Flomax] 0.4 mg PEG/G-TUBE DAILY@0930


   Dexlansoprazole [Dexilant] 60 mg PEG/G-TUBE DAILY@0930


   Cbd Oil 100 mg PEG/G-TUBE BID@0930,1200


   Na Phos,M-B/Na Phos,Di-Ba [Fleet Adult] 133 ml RECTAL ONCE PRN


     PRN Reason: CONSTIPATION FOR 5 DAYS


   Magnesium Citrate 5 oz PO ONCE PRN


     PRN Reason: CONSTIPATION FOR 4 DAYS


   Magnesium Hydroxide [Milk of Magnesia] 4,800 mg PO HS PRN


     PRN Reason: CONSTIPATION FOR 3 DAYS


   Rufinamide [Banzel] 1,600 mg PO BID@0000,1200





Discontinued


   Scopolamine [Scopolamine 1 MG/72 HR patch] 1 patch TRANSDERM Q72H


Discharge Medication List





Clobazam [Onfi] 20 mg PEG/G-TUBE BID@0000,1200 01/30/16 [History]


Lacosamide [Vimpat] 200 mg PEG/G-TUBE TID@0000,0930,1700 01/30/16 [History]


Ascorbic Acid [Vitamin C] 500 mg PEG/G-TUBE BID@0930,1700 06/03/19 [History]


Folic Acid 1 mg PEG/G-TUBE DAILY@0930 06/03/19 [History]


Polyethylene Glycol 3350 [Miralax] 17 gm PEG/G-TUBE DAILY@0930 06/03/19 

[History]


Vitamin B Complex 1 cap PEG/G-TUBE DAILY@0930 06/03/19 [History]


clonazePAM [KlonoPIN] 1 mg PEG/G-TUBE TID@0000,0930,1700 06/03/19 [History]


levETIRAcetam [Keppra Oral Solution] 1,800 mg PEG/G-TUBE TID@0000,0930,1700 06/03/19 [History]


Albuterol Nebulized [Ventolin Nebulized] 5 mg INHALATION Q12H 02/24/20 [History]


Cbd Oil 100 mg PEG/G-TUBE BID@0930,1200 02/24/20 [History]


Dexlansoprazole [Dexilant] 60 mg PEG/G-TUBE DAILY@0930 02/24/20 [History]


L.acidoph,Paracasei, B.lactis [Probiotic] 1 cap PEG/G-TUBE Q72H 02/24/20 [Histo

ry]


Tamsulosin [Flomax] 0.4 mg PEG/G-TUBE DAILY@0930 02/24/20 [History]


Magnesium Citrate 5 oz PO ONCE PRN 03/15/20 [History]


Magnesium Hydroxide [Milk of Magnesia] 4,800 mg PO HS PRN 03/15/20 [History]


Na Phos,M-B/Na Phos,Di-Ba [Fleet Adult] 133 ml RECTAL ONCE PRN 03/15/20 

[History]


Rufinamide [Banzel] 1,600 mg PO BID@0000,1200 03/15/20 [History]


Lactulose [Cephulac] 30 gm PO TID PRN #30 dose 03/24/20 [Rx]


Scopolamine [Scopolamine 1 MG/72 HR patch] 1 patch TRANSDERM Q72H #10 patch 

03/24/20 [Rx]








Follow up Appointment(s)/Referral(s): 


Micaela Bean MD [Primary Care Provider] - 1-2 days


[facility] [NON-STAFF] - As Needed


Jaspal Mario MD [STAFF PHYSICIAN] - 1 Week


Activity/Diet/Wound Care/Special Instructions: 


Silvio enteral: [phone]


Discharge Disposition: HOME WITH HOME HEALTH SERVICES

## 2020-03-24 NOTE — PN
PROGRESS NOTE



DATE OF SERVICE:

03/24/2020.



REASON FOR FOLLOWUP:

1. Aspiration pneumonitis.

2. UTI.



INTERVAL HISTORY:

The patient is afebrile.  The patient has breathing comfortably. He is 
hemodynamically

stable. No further vomiting has been witnessed.  He did have a bowel movement.



PHYSICAL EXAMINATION:

Blood pressure 130/69 with a pulse of 96, temperature is 97.3,

General description is a middle-aged male, lying in bed in no distress.

RESPIRATORY SYSTEM:  Unlabored breathing, clear to auscultation, no wheeze.

HEART:  S1, S2, regular rate and rhythm.

ABDOMEN:  Soft, nontender.



LABS:

Repeat urine culture has been negative.



DIAGNOSTIC IMPRESSION AND PLAN:

1. Patient with VRE urinary tract infection adequately treated.  Repeat has been

    negative. No need for further antibiotics on discharge.

2. Aspiration pneumonitis, adequately treated.  No need for antibiotics on 
discharge,

    patient is breathing comfortably on room air and no findings on clinical 
exam





MMODL / IJN: [id number] / Job#: [id number]

MTDD

## 2020-03-27 ENCOUNTER — HOSPITAL ENCOUNTER (EMERGENCY)
Dept: HOSPITAL 47 - EC | Age: 30
Discharge: HOME | End: 2020-03-27
Payer: MEDICARE

## 2020-03-27 VITALS
DIASTOLIC BLOOD PRESSURE: 76 MMHG | SYSTOLIC BLOOD PRESSURE: 133 MMHG | HEART RATE: 77 BPM | TEMPERATURE: 97.3 F | RESPIRATION RATE: 18 BRPM

## 2020-03-27 DIAGNOSIS — R82.71: ICD-10-CM

## 2020-03-27 DIAGNOSIS — G40.909: ICD-10-CM

## 2020-03-27 DIAGNOSIS — E86.0: Primary | ICD-10-CM

## 2020-03-27 DIAGNOSIS — F84.0: ICD-10-CM

## 2020-03-27 DIAGNOSIS — R82.81: ICD-10-CM

## 2020-03-27 DIAGNOSIS — D72.829: ICD-10-CM

## 2020-03-27 DIAGNOSIS — Z88.8: ICD-10-CM

## 2020-03-27 DIAGNOSIS — R11.0: ICD-10-CM

## 2020-03-27 DIAGNOSIS — Z88.0: ICD-10-CM

## 2020-03-27 DIAGNOSIS — Z79.899: ICD-10-CM

## 2020-03-27 DIAGNOSIS — J45.909: ICD-10-CM

## 2020-03-27 DIAGNOSIS — R19.7: ICD-10-CM

## 2020-03-27 DIAGNOSIS — Z93.1: ICD-10-CM

## 2020-03-27 DIAGNOSIS — Z88.1: ICD-10-CM

## 2020-03-27 DIAGNOSIS — K59.00: ICD-10-CM

## 2020-03-27 DIAGNOSIS — R41.82: ICD-10-CM

## 2020-03-27 DIAGNOSIS — R53.1: ICD-10-CM

## 2020-03-27 LAB
ALBUMIN SERPL-MCNC: 4.3 G/DL (ref 3.5–5)
ALP SERPL-CCNC: 112 U/L (ref 38–126)
ALT SERPL-CCNC: 47 U/L (ref 4–49)
ANION GAP SERPL CALC-SCNC: 11 MMOL/L
APTT BLD: 23.4 SEC (ref 22–30)
AST SERPL-CCNC: 33 U/L (ref 17–59)
BASOPHILS # BLD AUTO: 0 K/UL (ref 0–0.2)
BASOPHILS NFR BLD AUTO: 0 %
BUN SERPL-SCNC: 7 MG/DL (ref 9–20)
CALCIUM SPEC-MCNC: 9 MG/DL (ref 8.4–10.2)
CHLORIDE SERPL-SCNC: 105 MMOL/L (ref 98–107)
CO2 SERPL-SCNC: 24 MMOL/L (ref 22–30)
EOSINOPHIL # BLD AUTO: 0.1 K/UL (ref 0–0.7)
EOSINOPHIL NFR BLD AUTO: 1 %
ERYTHROCYTE [DISTWIDTH] IN BLOOD BY AUTOMATED COUNT: 3.82 M/UL (ref 4.3–5.9)
ERYTHROCYTE [DISTWIDTH] IN BLOOD: 15.8 % (ref 11.5–15.5)
GLUCOSE SERPL-MCNC: 110 MG/DL (ref 74–99)
HCT VFR BLD AUTO: 37.8 % (ref 39–53)
HGB BLD-MCNC: 12.5 GM/DL (ref 13–17.5)
HYALINE CASTS UR QL AUTO: 1 /LPF (ref 0–2)
INR PPP: 0.9 (ref ?–1.2)
KETONES UR QL STRIP.AUTO: (no result)
LYMPHOCYTES # SPEC AUTO: 0.6 K/UL (ref 1–4.8)
LYMPHOCYTES NFR SPEC AUTO: 6 %
MAGNESIUM SPEC-SCNC: 1.9 MG/DL (ref 1.6–2.3)
MCH RBC QN AUTO: 32.7 PG (ref 25–35)
MCHC RBC AUTO-ENTMCNC: 33 G/DL (ref 31–37)
MCV RBC AUTO: 99 FL (ref 80–100)
MONOCYTES # BLD AUTO: 0.6 K/UL (ref 0–1)
MONOCYTES NFR BLD AUTO: 5 %
NEUTROPHILS # BLD AUTO: 9.4 K/UL (ref 1.3–7.7)
NEUTROPHILS NFR BLD AUTO: 87 %
PH UR: 6 [PH] (ref 5–8)
PLATELET # BLD AUTO: 207 K/UL (ref 150–450)
POTASSIUM SERPL-SCNC: 3.5 MMOL/L (ref 3.5–5.1)
PROT SERPL-MCNC: 6.7 G/DL (ref 6.3–8.2)
PT BLD: 9.6 SEC (ref 9–12)
RBC UR QL: 6 /HPF (ref 0–5)
SODIUM SERPL-SCNC: 140 MMOL/L (ref 137–145)
SP GR UR: 1.01 (ref 1–1.03)
UROBILINOGEN UR QL STRIP: <2 MG/DL (ref ?–2)
WBC # BLD AUTO: 10.8 K/UL (ref 3.8–10.6)
WBC # UR AUTO: 70 /HPF (ref 0–5)

## 2020-03-27 PROCEDURE — 85730 THROMBOPLASTIN TIME PARTIAL: CPT

## 2020-03-27 PROCEDURE — 51701 INSERT BLADDER CATHETER: CPT

## 2020-03-27 PROCEDURE — 83605 ASSAY OF LACTIC ACID: CPT

## 2020-03-27 PROCEDURE — 83735 ASSAY OF MAGNESIUM: CPT

## 2020-03-27 PROCEDURE — 87086 URINE CULTURE/COLONY COUNT: CPT

## 2020-03-27 PROCEDURE — 74018 RADEX ABDOMEN 1 VIEW: CPT

## 2020-03-27 PROCEDURE — 80053 COMPREHEN METABOLIC PANEL: CPT

## 2020-03-27 PROCEDURE — 81001 URINALYSIS AUTO W/SCOPE: CPT

## 2020-03-27 PROCEDURE — 82272 OCCULT BLD FECES 1-3 TESTS: CPT

## 2020-03-27 PROCEDURE — 83690 ASSAY OF LIPASE: CPT

## 2020-03-27 PROCEDURE — 85610 PROTHROMBIN TIME: CPT

## 2020-03-27 PROCEDURE — 96361 HYDRATE IV INFUSION ADD-ON: CPT

## 2020-03-27 PROCEDURE — 96360 HYDRATION IV INFUSION INIT: CPT

## 2020-03-27 PROCEDURE — 99285 EMERGENCY DEPT VISIT HI MDM: CPT

## 2020-03-27 PROCEDURE — 85025 COMPLETE CBC W/AUTO DIFF WBC: CPT

## 2020-03-27 PROCEDURE — 36415 COLL VENOUS BLD VENIPUNCTURE: CPT

## 2020-03-27 RX ADMIN — SODIUM PHOSPHATE STA ML: 7; 19 ENEMA RECTAL at 18:58

## 2020-03-27 RX ADMIN — SODIUM PHOSPHATE STA ML: 7; 19 ENEMA RECTAL at 18:42

## 2020-03-27 NOTE — ED
Nausea/Vomiting/Diarrhea HPI





- General


Chief complaint: Nausea/Vomiting/Diarrhea


Stated complaint: Vomiting, blood in stool


Time Seen by Provider: 20 15:38


Source: Caregiver


Mode of arrival: wheelchair


Limitations: altered mental status, physical limitation





- History of Present Illness


Initial comments: 





Patient is a 30-year-old male, with history of developmental delay, autism, 

presenting to the emergency department with his caretaker/guardian/aunt.  

Patient has tracheostomy and PEG tube.  Caretaker states that patient had one 

episode of vomiting at approximately noon today and also had stool with blood in

it today.  Patient was recently discharged from the hospital 4 days ago for 

bowel obstruction.  They have not followed up since discharge.  Patient is not 

complaining of any abdominal pain.  The caretaker does not believe that he is in

any pain as he seems very comfortable at home.  Patient has been requesting to 

sleep a lot and not able to help with transfers very much.  Caretaker has not 

been able to give a lot of the PEG tube feedings secondary to patient not 

wanting to sit upright for just today.  There've been no fevers, shortness of 

breath, cough.  There are no other complaints at this time.  Upon arrival to the

ER, vital signs are stable.





- Related Data


                                Home Medications











 Medication  Instructions  Recorded  Confirmed


 


Clobazam [Onfi] 20 mg PEG/G-TUBE BID@0000,1200 16


 


Lacosamide [Vimpat] 200 mg PEG/G-TUBE 16





 TID@0000,0930,1700  


 


Ascorbic Acid [Vitamin C] 500 mg PEG/G-TUBE BID@0930,1700 19


 


Folic Acid 1 mg PEG/G-TUBE DAILY@19


 


Polyethylene Glycol 3350 [Miralax] 17 gm PEG/G-TUBE DAILY@0919


 


Vitamin B Complex 1 cap PEG/G-TUBE DAILY@19


 


clonazePAM [KlonoPIN] 1 mg PEG/G-TUBE TID@0000,0930,1700 19


 


levETIRAcetam [Keppra Oral 1,800 mg PEG/G-TUBE 19





Solution] TID@0000,0930,1700  


 


Albuterol Nebulized [Ventolin 5 mg INHALATION RT-BID 20





Nebulized]   


 


Cbd Oil 100 mg PEG/G-TUBE BID@0930,1200 20


 


Dexlansoprazole [Dexilant] 60 mg PEG/G-TUBE DAILY@0930 20


 


L.acidoph,Paracasei, B.lactis 1 cap PEG/G-TUBE Q72H 20





[Probiotic]   


 


Tamsulosin [Flomax] 0.4 mg PEG/G-TUBE DAILY@0930 20


 


Magnesium Citrate 5 oz PEG/G-TUBE ONCE PRN 03/15/20 03/27/20


 


Magnesium Hydroxide [Milk of 4,800 mg PO HS PRN 03/15/20 03/27/20





Magnesia]   


 


Na Phos,M-B/Na Phos,Di-Ba [Fleet 133 ml RECTAL ONCE PRN 03/15/20 03/27/20





Adult]   


 


Rufinamide [Banzel] 1,600 mg PO BID@0000,1200 03/15/20 03/27/20


 


Lactulose [Cephulac] 30 gm PEG/G-TUBE TID PRN 20








                                  Previous Rx's











 Medication  Instructions  Recorded


 


Scopolamine [Scopolamine 1 MG/72 1 patch TRANSDERM Q72H #10 patch 20





HR patch]  











                                    Allergies











Allergy/AdvReac Type Severity Reaction Status Date / Time


 


amoxicillin trihydrate Allergy  Unknown Verified 20 15:34





[From Augmentin]     


 


ceftriaxone sodium Allergy  Unknown Verified 20 15:34





[From Rocephin]     


 


cephalexin monohydrate Allergy  Unknown Verified 20 15:34





[From Keflex]     


 


Penicillins Allergy  Unknown Verified 20 15:34


 


phenobarbital Allergy  Unknown Verified 20 15:34


 


phenytoin sodium Allergy  Unknown Verified 20 15:34





[From Dilantin]     


 


phenytoin sodium extended Allergy  Unknown Verified 20 15:34





[From Dilantin]     


 


potassium clavulanate Allergy  Unknown Verified 20 15:34





[From Augmentin]     


 


Cephalosporins AdvReac  Unknown Verified 20 15:34


 


lorazepam [From Ativan] AdvReac  Unknown Verified 20 15:34














Review of Systems


ROS Statement: 


Those systems with pertinent positive or pertinent negative responses have been 

documented in the HPI.





ROS Other: All systems not noted in ROS Statement are negative.





Past Medical History


Past Medical History: Asthma, Respiratory Disorder, Seizure Disorder


Additional Past Medical History / Comment(s): developmental delay, autism, last 

sz last night partial complex, trach, intra abdominal wall abcess


History of Any Multi-Drug Resistant Organisms: VRE


Date of last positivie culture/infection: 3/15/20


MDRO Source:: VRE URINE


Additional Past Surgical History / Comment(s): vagal nerve stimulator 2015, 

left UPJ endopylotomy, embolist surgery, heel cord lengthening, transurethral 

bladder neck and prostate incision 2020


Past Anesthesia/Blood Transfusion Reactions: No Reported Reaction


Past Psychological History: ADD/ADHD


Smoking Status: Never smoker


Past Alcohol Use History: None Reported


Past Drug Use History: None Reported





- Past Family History


  ** Mother


Additional Family Medical History / Comment(s):  of Suicide at 22





General Exam





- General Exam Comments


Initial Comments: 





GENERAL: 


Well-appearing, well-nourished and in no acute distress.  Resting comfortably.





HEAD: 


Atraumatic, normocephalic.





EYES:


Pupils equal round and reactive to light, extraocular movements intact, sclera 

anicteric, conjunctiva are normal.





ENT: 


TMs normal, nares patent, oropharynx clear without exudates.  Moist mucous 

membranes. Trach present.





NECK: 


Normal range of motion, supple without lymphadenopathy or JVD.





LUNGS:


 Breath sounds clear to auscultation bilaterally and equal.  No wheezes rales or

rhonchi.





HEART:


Regular rate and rhythm without murmurs, rubs or gallops.





ABDOMEN: 


Soft, nontender, normoactive bowel sounds.  No guarding, no rebound.  No masses 

appreciated.  PEG tube site appears clean and dry.  No signs of infection.





: Deferred 





EXTREMITIES: 


Normal range of motion, no pitting or edema.  No clubbing or cyanosis.





NEUROLOGICAL: 


Developmental delay, autism.





PSYCH:


Normal mood, normal affect.





SKIN:


 Warm, Dry, normal turgor, no rashes or lesions noted.


Limitations: altered mental status, physical limitation





Course


                                   Vital Signs











  20





  15:31 19:39


 


Temperature 97.4 F L 97.3 F L


 


Pulse Rate 89 77


 


Respiratory 20 18





Rate  


 


Blood Pressure 128/66 133/76


 


O2 Sat by Pulse 100 97





Oximetry  














Medical Decision Making





- Medical Decision Making





Patient is a 30-year-old male she also some, mental delay here with caretaker 

for one episode of vomiting and diarrhea as well as weakness 1 day.  Patient 

was recently admitted for bowel obstruction.  Vitals are stable.  Abdomen feels 

soft, nontender.  Laboratory shows very slight leukocytosis at 10.8.  Hemoglobin

is 12.5, lipase is 317.  Lactic acid is normal.  Urine shows 2+ ketones, 78 WBC,

rare bacteria.  Urine culture is pending.  Stool occult is negative.  KUB shows 

persistent overall nonspecific bowel gas pattern,Moderate proximal and distal 

fecal retention seen.  Patient was given fluids.  Patient caretaker wanted us to

give an enema before discharge.  Patient had significant bowel movement with 

enema.  Patient is stable for discharge.  Caretakers agreement with this plan of

care.  She will continue to increase fluid intake and give additional enema if 

needed.  He'll follow up with PCP.  Return parameters were discussed with the 

caretaker and she verbalized understanding.  Case discussed with Dr. Churchill. 





- Lab Data


Result diagrams: 


                                 20 16:10





                                 20 16:10


                                   Lab Results











  20 Range/Units





  16:10 16:10 16:10 


 


WBC  10.8 H    (3.8-10.6)  k/uL


 


RBC  3.82 L    (4.30-5.90)  m/uL


 


Hgb  12.5 L    (13.0-17.5)  gm/dL


 


Hct  37.8 L    (39.0-53.0)  %


 


MCV  99.0    (80.0-100.0)  fL


 


MCH  32.7    (25.0-35.0)  pg


 


MCHC  33.0    (31.0-37.0)  g/dL


 


RDW  15.8 H    (11.5-15.5)  %


 


Plt Count  207    (150-450)  k/uL


 


Neutrophils %  87    %


 


Lymphocytes %  6    %


 


Monocytes %  5    %


 


Eosinophils %  1    %


 


Basophils %  0    %


 


Neutrophils #  9.4 H    (1.3-7.7)  k/uL


 


Lymphocytes #  0.6 L    (1.0-4.8)  k/uL


 


Monocytes #  0.6    (0-1.0)  k/uL


 


Eosinophils #  0.1    (0-0.7)  k/uL


 


Basophils #  0.0    (0-0.2)  k/uL


 


Poikilocytosis  Slight    


 


Macrocytosis  Slight    


 


PT     (9.0-12.0)  sec


 


INR     (<1.2)  


 


APTT     (22.0-30.0)  sec


 


Sodium    140  (137-145)  mmol/L


 


Potassium    3.5  (3.5-5.1)  mmol/L


 


Chloride    105  ()  mmol/L


 


Carbon Dioxide    24  (22-30)  mmol/L


 


Anion Gap    11  mmol/L


 


BUN    7 L  (9-20)  mg/dL


 


Creatinine    0.79  (0.66-1.25)  mg/dL


 


Est GFR (CKD-EPI)AfAm    >90  (>60 ml/min/1.73 sqM)  


 


Est GFR (CKD-EPI)NonAf    >90  (>60 ml/min/1.73 sqM)  


 


Glucose    110 H  (74-99)  mg/dL


 


Plasma Lactic Acid Lobo     (0.7-2.0)  mmol/L


 


Calcium    9.0  (8.4-10.2)  mg/dL


 


Magnesium    1.9  (1.6-2.3)  mg/dL


 


Total Bilirubin    0.2  (0.2-1.3)  mg/dL


 


AST    33  (17-59)  U/L


 


ALT    47  (4-49)  U/L


 


Alkaline Phosphatase    112  ()  U/L


 


Total Protein    6.7  (6.3-8.2)  g/dL


 


Albumin    4.3  (3.5-5.0)  g/dL


 


Lipase    317 H  ()  U/L


 


Urine Color     


 


Urine Appearance     (Clear)  


 


Urine pH     (5.0-8.0)  


 


Ur Specific Gravity     (1.001-1.035)  


 


Urine Protein     (Negative)  


 


Urine Glucose (UA)     (Negative)  


 


Urine Ketones     (Negative)  


 


Urine Blood     (Negative)  


 


Urine Nitrite     (Negative)  


 


Urine Bilirubin     (Negative)  


 


Urine Urobilinogen     (<2.0)  mg/dL


 


Ur Leukocyte Esterase     (Negative)  


 


Urine RBC     (0-5)  /hpf


 


Urine WBC     (0-5)  /hpf


 


Urine Bacteria     (None)  /hpf


 


Hyaline Casts     (0-2)  /lpf


 


Urine Mucus     (None)  /hpf


 


Stool Occult Blood   Negative   (Negative)  














  20 Range/Units





  16:10 16:10 17:55 


 


WBC     (3.8-10.6)  k/uL


 


RBC     (4.30-5.90)  m/uL


 


Hgb     (13.0-17.5)  gm/dL


 


Hct     (39.0-53.0)  %


 


MCV     (80.0-100.0)  fL


 


MCH     (25.0-35.0)  pg


 


MCHC     (31.0-37.0)  g/dL


 


RDW     (11.5-15.5)  %


 


Plt Count     (150-450)  k/uL


 


Neutrophils %     %


 


Lymphocytes %     %


 


Monocytes %     %


 


Eosinophils %     %


 


Basophils %     %


 


Neutrophils #     (1.3-7.7)  k/uL


 


Lymphocytes #     (1.0-4.8)  k/uL


 


Monocytes #     (0-1.0)  k/uL


 


Eosinophils #     (0-0.7)  k/uL


 


Basophils #     (0-0.2)  k/uL


 


Poikilocytosis     


 


Macrocytosis     


 


PT   9.6   (9.0-12.0)  sec


 


INR   0.9   (<1.2)  


 


APTT   23.4   (22.0-30.0)  sec


 


Sodium     (137-145)  mmol/L


 


Potassium     (3.5-5.1)  mmol/L


 


Chloride     ()  mmol/L


 


Carbon Dioxide     (22-30)  mmol/L


 


Anion Gap     mmol/L


 


BUN     (9-20)  mg/dL


 


Creatinine     (0.66-1.25)  mg/dL


 


Est GFR (CKD-EPI)AfAm     (>60 ml/min/1.73 sqM)  


 


Est GFR (CKD-EPI)NonAf     (>60 ml/min/1.73 sqM)  


 


Glucose     (74-99)  mg/dL


 


Plasma Lactic Acid Lobo  0.9    (0.7-2.0)  mmol/L


 


Calcium     (8.4-10.2)  mg/dL


 


Magnesium     (1.6-2.3)  mg/dL


 


Total Bilirubin     (0.2-1.3)  mg/dL


 


AST     (17-59)  U/L


 


ALT     (4-49)  U/L


 


Alkaline Phosphatase     ()  U/L


 


Total Protein     (6.3-8.2)  g/dL


 


Albumin     (3.5-5.0)  g/dL


 


Lipase     ()  U/L


 


Urine Color    Light Yellow  


 


Urine Appearance    Clear  (Clear)  


 


Urine pH    6.0  (5.0-8.0)  


 


Ur Specific Gravity    1.011  (1.001-1.035)  


 


Urine Protein    Trace H  (Negative)  


 


Urine Glucose (UA)    Negative  (Negative)  


 


Urine Ketones    2+ H  (Negative)  


 


Urine Blood    Small H  (Negative)  


 


Urine Nitrite    Negative  (Negative)  


 


Urine Bilirubin    Negative  (Negative)  


 


Urine Urobilinogen    <2.0  (<2.0)  mg/dL


 


Ur Leukocyte Esterase    Large H  (Negative)  


 


Urine RBC    6 H  (0-5)  /hpf


 


Urine WBC    70 H  (0-5)  /hpf


 


Urine Bacteria    Rare H  (None)  /hpf


 


Hyaline Casts    1  (0-2)  /lpf


 


Urine Mucus    Rare H  (None)  /hpf


 


Stool Occult Blood     (Negative)  














Disposition


Clinical Impression: 


 Constipation, Dehydration





Disposition: HOME SELF-CARE


Condition: Stable


Instructions (If sedation given, give patient instructions):  Constipation (ED)


Additional Instructions: 


Please return to the Emergency Department if symptoms worsen or any other 

concerns.


Continue with at home enemas as needed for constipation as well as MiraLAX.  


Follow up with GI doctor as discussed.


Is patient prescribed a controlled substance at d/c from ED?: No


Referrals: 


Micaela Bean MD [Primary Care Provider] - 1-2 days

## 2020-03-27 NOTE — XR
EXAMINATION TYPE: XR KUB

 

DATE OF EXAM: 3/27/2020 6:14 PM

 

CLINICAL HISTORY:  Vomiting and pain. Bloody diarrhea today.

 

TECHNIQUE:  Two supine KUB images of the abdomen are obtained.

 

COMPARISON: CT abdomen and pelvis 8 days ago.

 

FINDINGS: Lung bases remain clear. PEG tube redemonstrated in nondistended stomach. Persistent gas pr
ominent colonic loops. Some possibility of small bowel gas redemonstrated. Fecal prominence in the ri
ght colon and rectum currently. Left mid abdominal vascular coils redemonstrated. Osseous structures 
are intact.

 

IMPRESSION: Persistent overall nonspecific bowel gas pattern favoring diffuse colonic ileus. Moderate
 proximal and distal colonic fecal retention on current study.

## 2020-05-28 ENCOUNTER — HOSPITAL ENCOUNTER (EMERGENCY)
Dept: HOSPITAL 47 - EC | Age: 30
Discharge: HOME | End: 2020-05-28
Payer: MEDICARE

## 2020-05-28 VITALS
HEART RATE: 72 BPM | TEMPERATURE: 97.7 F | DIASTOLIC BLOOD PRESSURE: 60 MMHG | RESPIRATION RATE: 17 BRPM | SYSTOLIC BLOOD PRESSURE: 94 MMHG

## 2020-05-28 DIAGNOSIS — R33.9: Primary | ICD-10-CM

## 2020-05-28 DIAGNOSIS — Z88.8: ICD-10-CM

## 2020-05-28 DIAGNOSIS — G40.909: ICD-10-CM

## 2020-05-28 DIAGNOSIS — R62.50: ICD-10-CM

## 2020-05-28 DIAGNOSIS — Z87.19: ICD-10-CM

## 2020-05-28 DIAGNOSIS — Z88.0: ICD-10-CM

## 2020-05-28 DIAGNOSIS — Z93.1: ICD-10-CM

## 2020-05-28 DIAGNOSIS — Z79.51: ICD-10-CM

## 2020-05-28 DIAGNOSIS — Z88.1: ICD-10-CM

## 2020-05-28 DIAGNOSIS — F90.9: ICD-10-CM

## 2020-05-28 DIAGNOSIS — Z79.899: ICD-10-CM

## 2020-05-28 DIAGNOSIS — R14.0: ICD-10-CM

## 2020-05-28 DIAGNOSIS — J45.909: ICD-10-CM

## 2020-05-28 LAB
PH UR: 5.5 [PH] (ref 5–8)
SP GR UR: 1.03 (ref 1–1.03)
UROBILINOGEN UR QL STRIP: <2 MG/DL (ref ?–2)

## 2020-05-28 PROCEDURE — 99284 EMERGENCY DEPT VISIT MOD MDM: CPT

## 2020-05-28 PROCEDURE — 51798 US URINE CAPACITY MEASURE: CPT

## 2020-05-28 PROCEDURE — 81003 URINALYSIS AUTO W/O SCOPE: CPT

## 2020-05-28 PROCEDURE — 51702 INSERT TEMP BLADDER CATH: CPT

## 2020-05-28 NOTE — ED
Male Urogenital HPI





- General


Chief complaint: Urogenital


Stated complaint: Urine retention


Source: patient


Mode of arrival: wheelchair


Limitations: language barrier, altered mental status, physical limitation





- History of Present Illness


Initial comments: 





Patient is a 30 -year-old male with extensive past medical history presenting to

emergency Department with chief complaint of urinary retention.  Caregiver 

states patient has not urinated in the past 2 days.  Although, she has been 

decreasing his fluid intake and almost half of his baseline due to his 

repetitive urinary retention issues.  States that an appointment scheduled 

tomorrow to see a urologist.  States the patient has a distended lower abdomen. 

States she has been given an enema in order to promote urine movement.  Denies 

any night sweats fevers or chills.  Caregiver denies any testicular swelling or 

penile discharge.





- Related Data


                                Home Medications











 Medication  Instructions  Recorded  Confirmed


 


Clobazam [Onfi] 20 mg PEG/G-TUBE BID@0000,1200 16


 


Lacosamide [Vimpat] 200 mg PEG/G-TUBE 16





 TID@0000,0930,1700  


 


Ascorbic Acid [Vitamin C] 500 mg PEG/G-TUBE BID@0930,1700 19


 


Folic Acid 1 mg PEG/G-TUBE DAILY@0930 19


 


Polyethylene Glycol 3350 [Miralax] 17 gm PEG/G-TUBE DAILY@0930 19


 


Vitamin B Complex 1 cap PEG/G-TUBE DAILY@0930 19


 


clonazePAM [KlonoPIN] 1 mg PEG/G-TUBE TID@0000,0930,1700 19


 


levETIRAcetam [Keppra Oral 1,800 mg PEG/G-TUBE 19





Solution] TID@0000,0930,1700  


 


Albuterol Nebulized [Ventolin 5 mg INHALATION RT-BID 20





Nebulized]   


 


Cbd Oil 100 mg PEG/G-TUBE BID@0930,1200 20


 


Dexlansoprazole [Dexilant] 60 mg PEG/G-TUBE DAILY@0930 20


 


L.acidoph,Paracasei, B.lactis 1 cap PEG/G-TUBE Q72H 20





[Probiotic]   


 


Tamsulosin [Flomax] 0.4 mg PEG/G-TUBE DAILY@0930 20


 


Magnesium Citrate 5 oz PEG/G-TUBE ONCE PRN 03/15/20 03/27/20


 


Magnesium Hydroxide [Milk of 4,800 mg PO HS PRN 03/15/20 03/27/20





Magnesia]   


 


Na Phos,M-B/Na Phos,Di-Ba [Fleet 133 ml RECTAL ONCE PRN 03/15/20 03/27/20





Adult]   


 


Rufinamide [Banzel] 1,600 mg PO BID@0000,1200 03/15/20 03/27/20


 


Lactulose [Cephulac] 30 gm PEG/G-TUBE TID PRN 20








                                  Previous Rx's











 Medication  Instructions  Recorded


 


Scopolamine [Scopolamine 1 MG/72 1 patch TRANSDERM Q72H #10 patch 20





HR patch]  











                                    Allergies











Allergy/AdvReac Type Severity Reaction Status Date / Time


 


amoxicillin trihydrate Allergy  Unknown Verified 20 17:49





[From Augmentin]     


 


ceftriaxone sodium Allergy  Unknown Verified 20 17:49





[From Rocephin]     


 


cephalexin monohydrate Allergy  Unknown Verified 20 17:49





[From Keflex]     


 


Penicillins Allergy  Unknown Verified 20 17:49


 


phenobarbital Allergy  Unknown Verified 20 17:49


 


phenytoin sodium Allergy  Unknown Verified 20 17:49





[From Dilantin]     


 


phenytoin sodium extended Allergy  Unknown Verified 20 17:49





[From Dilantin]     


 


potassium clavulanate Allergy  Unknown Verified 20 17:49





[From Augmentin]     


 


Cephalosporins AdvReac  Unknown Verified 20 17:49


 


lorazepam [From Ativan] AdvReac  Unknown Verified 20 17:49














Review of Systems


ROS Statement: 


Those systems with pertinent positive or pertinent negative responses have been 

documented in the HPI.





ROS Other: All systems not noted in ROS Statement are negative.





Past Medical History


Past Medical History: Asthma, Respiratory Disorder, Seizure Disorder


Additional Past Medical History / Comment(s): developmental delay, autism, last 

sz last night partial complex, trach, intra abdominal wall abcess, lennox 

gasteat seizures.


History of Any Multi-Drug Resistant Organisms: VRE


Date of last positivie culture/infection: 3/15/20


MDRO Source:: VRE URINE,


Additional Past Surgical History / Comment(s): vagal nerve stimulator 2015, 

left UPJ endopylotomy, embolist surgery, heel cord lengthening, transurethral 

bladder neck and prostate incision 2020


Past Anesthesia/Blood Transfusion Reactions: No Reported Reaction


Past Psychological History: ADD/ADHD


Smoking Status: Never smoker


Past Alcohol Use History: None Reported


Past Drug Use History: None Reported





- Past Family History


  ** Mother


Additional Family Medical History / Comment(s):  of Suicide at 22





General Exam


Limitations: language barrier, altered mental status, physical limitation


General appearance: alert, in no apparent distress


Head exam: Present: atraumatic, normocephalic, normal inspection


Eye exam: Present: normal appearance, PERRL, EOMI


Pupils: Present: normal accommodation


ENT exam: Present: normal exam, mucous membranes moist


Neck exam: Present: normal inspection, full ROM


Respiratory exam: Present: normal lung sounds bilaterally


Cardiovascular Exam: Present: regular rate, normal rhythm, normal heart sounds


GI/Abdominal exam: Present: soft, distended (Lower abdominal distention)


Extremities exam: Present: normal inspection, full ROM


Back exam: Present: normal inspection, full ROM


Neurological exam: Present: alert


Psychiatric exam: Present: normal affect, normal mood


Skin exam: Present: warm, dry, intact, normal color





Course


                                   Vital Signs











  20





  17:44


 


Temperature 97.7 F


 


Pulse Rate 72


 


Respiratory 17





Rate 


 


Blood Pressure 94/60


 


O2 Sat by Pulse 100





Oximetry 














Medical Decision Making





- Medical Decision Making





Patient is a 30-year-old male presenting to emergency Department with a chief 

complaint of urinary retention.  Patient has history of repetitive urinary 

retention issues.  On exam patient has lower abdominal distention and appears to

be discomfort when region is palpated.  Full catheter was placed and patient was

able to drain nearly 500 mL of urine.  UA is unremarkable.  Urine culture 

pending.  Post void bladder scan reveals 100 mL of urine left in the bladder.  

[place] catheter continues to drain.  They're going to see urologist tomorrow.  

Return parameters thoroughly discussed the patient is understanding and 

agreeable.  Case discussed with physician.





- Lab Data


                                   Lab Results











  20 Range/Units





  18:25 


 


Urine Color  Yellow  


 


Urine Appearance  Clear  (Clear)  


 


Urine pH  5.5  (5.0-8.0)  


 


Ur Specific Gravity  1.029  (1.001-1.035)  


 


Urine Protein  Trace H  (Negative)  


 


Urine Glucose (UA)  Negative  (Negative)  


 


Urine Ketones  Trace H  (Negative)  


 


Urine Blood  Negative  (Negative)  


 


Urine Nitrite  Negative  (Negative)  


 


Urine Bilirubin  Negative  (Negative)  


 


Urine Urobilinogen  <2.0  (<2.0)  mg/dL


 


Ur Leukocyte Esterase  Negative  (Negative)  














Disposition


Clinical Impression: 


 Acute urinary retention





Disposition: HOME SELF-CARE


Condition: Stable


Instructions (If sedation given, give patient instructions):  Urinary Retention 

in Men (ED)


Additional Instructions: 


Follow-up with urology.  Return to emergency department if symptoms worsen.


Is patient prescribed a controlled substance at d/c from ED?: No


Referrals: 


Micaela Bean MD [Primary Care Provider] - 1-2 days


Time of Disposition: 19:10

## 2020-07-02 ENCOUNTER — HOSPITAL ENCOUNTER (EMERGENCY)
Dept: HOSPITAL 47 - EC | Age: 30
Discharge: HOME | End: 2020-07-02
Payer: MEDICARE

## 2020-07-02 VITALS — HEART RATE: 114 BPM | SYSTOLIC BLOOD PRESSURE: 80 MMHG | DIASTOLIC BLOOD PRESSURE: 55 MMHG

## 2020-07-02 VITALS — RESPIRATION RATE: 18 BRPM | TEMPERATURE: 98 F

## 2020-07-02 DIAGNOSIS — K94.23: Primary | ICD-10-CM

## 2020-07-02 DIAGNOSIS — Z79.899: ICD-10-CM

## 2020-07-02 DIAGNOSIS — G40.909: ICD-10-CM

## 2020-07-02 DIAGNOSIS — Z88.8: ICD-10-CM

## 2020-07-02 DIAGNOSIS — F90.9: ICD-10-CM

## 2020-07-02 DIAGNOSIS — J45.909: ICD-10-CM

## 2020-07-02 DIAGNOSIS — R62.50: ICD-10-CM

## 2020-07-02 DIAGNOSIS — Z88.0: ICD-10-CM

## 2020-07-02 DIAGNOSIS — K59.09: ICD-10-CM

## 2020-07-02 DIAGNOSIS — Z88.1: ICD-10-CM

## 2020-07-02 DIAGNOSIS — F84.0: ICD-10-CM

## 2020-07-02 DIAGNOSIS — Z87.19: ICD-10-CM

## 2020-07-02 DIAGNOSIS — Z79.51: ICD-10-CM

## 2020-07-02 PROCEDURE — 99284 EMERGENCY DEPT VISIT MOD MDM: CPT

## 2020-07-02 PROCEDURE — 74018 RADEX ABDOMEN 1 VIEW: CPT

## 2020-07-02 NOTE — ED
General Adult HPI





- General


Chief complaint: Seizure


Stated complaint: Peg tube issues


Time Seen by Provider: 20 19:50


Source: patient, family


Mode of arrival: wheelchair


Limitations: altered mental status, physical limitation





- History of Present Illness


Initial comments: 





Patient is a 30-year-old male with extensive medical history, nonverbal with PEG

tube dependent presenting to emergency Department with a chief complaint of PEG 

tube problems.  Caregiver states she attempted to give his antiepileptic 

medication which include Keppra, Vimpat, clonazepam about 4 hours prior to 

arrival when she was able to push the medication through the PEG tube but after 

she removed the syringe, large amounts of air came out.  Caregiver states she 

was not able to draw any residual.  She is concerned the PEG tube was 

malfunctioning.  States the PEG tube was replaced less than 4 months ago.  

Caregiver states the patient developed a seizure around 1900 today which is 

somewhat typical for him.  However, she is concerned the patient might undergo 

status epilepticus as the medication could potentially not have gone through the

PEG tube.  She is requesting KUB to confirm placement of PEG tube.





- Related Data


                                Home Medications











 Medication  Instructions  Recorded  Confirmed


 


Clobazam [Onfi] 20 mg PEG/G-TUBE BID@0000,1200 16


 


Lacosamide [Vimpat] 200 mg PEG/G-TUBE 16





 TID@0000,0930,1700  


 


Ascorbic Acid [Vitamin C] 500 mg PEG/G-TUBE BID@0930,1700 19


 


Folic Acid 1 mg PEG/G-TUBE DAILY@0919


 


Polyethylene Glycol 3350 [Miralax] 17 gm PEG/G-TUBE DAILY@0930 19


 


Vitamin B Complex 1 cap PEG/G-TUBE DAILY@0930 19


 


clonazePAM [KlonoPIN] 1 mg PEG/G-TUBE TID@0000,0930,1700 19


 


levETIRAcetam [Keppra Oral 1,800 mg PEG/G-TUBE 19





Solution] TID@0000,0930,1700  


 


Albuterol Nebulized [Ventolin 5 mg INHALATION RT-BID 20





Nebulized]   


 


Cbd Oil 100 mg PEG/G-TUBE BID@0930,1200 20


 


Dexlansoprazole [Dexilant] 60 mg PEG/G-TUBE DAILY@0920


 


L.acidoph,Paracasei, B.lactis 1 cap PEG/G-TUBE Q72H 20





[Probiotic]   


 


Tamsulosin [Flomax] 0.4 mg PEG/G-TUBE DAILY@20


 


Magnesium Citrate 5 oz PEG/G-TUBE ONCE PRN 03/15/20 03/27/20


 


Magnesium Hydroxide [Milk of 4,800 mg PO HS PRN 03/15/20 03/27/20





Magnesia]   


 


Na Phos,M-B/Na Phos,Di-Ba [Fleet 133 ml RECTAL ONCE PRN 03/15/20 03/27/20





Adult]   


 


Rufinamide [Banzel] 1,600 mg PO BID@0000,1200 03/15/20 03/27/20


 


Lactulose [Cephulac] 30 gm PEG/G-TUBE TID PRN 20








                                  Previous Rx's











 Medication  Instructions  Recorded


 


Scopolamine [Scopolamine 1 MG/72 1 patch TRANSDERM Q72H #10 patch 20





HR patch]  











                                    Allergies











Allergy/AdvReac Type Severity Reaction Status Date / Time


 


amoxicillin trihydrate Allergy  Unknown Verified 20 19:27





[From Augmentin]     


 


ceftriaxone sodium Allergy  Unknown Verified 20 19:27





[From Rocephin]     


 


cephalexin monohydrate Allergy  Unknown Verified 20 19:27





[From Keflex]     


 


Penicillins Allergy  Unknown Verified 20 19:27


 


phenobarbital Allergy  Unknown Verified 20 19:27


 


phenytoin sodium Allergy  Unknown Verified 20 19:27





[From Dilantin]     


 


phenytoin sodium extended Allergy  Unknown Verified 20 19:27





[From Dilantin]     


 


potassium clavulanate Allergy  Unknown Verified 20 19:27





[From Augmentin]     


 


Cephalosporins AdvReac  Unknown Verified 20 19:27


 


lorazepam [From Ativan] AdvReac  Unknown Verified 20 19:27














Review of Systems


ROS Statement: 


Those systems with pertinent positive or pertinent negative responses have been 

documented in the HPI.





ROS Other: All systems not noted in ROS Statement are negative.





Past Medical History


Past Medical History: Asthma, Respiratory Disorder, Seizure Disorder


Additional Past Medical History / Comment(s): developmental delay, autism, last 

sz last night partial complex, trach, intra abdominal wall abcess, lennox 

gasteat seizures. urinary retention, chronic constipation


History of Any Multi-Drug Resistant Organisms: VRE


Date of last positivie culture/infection: 3/15/20


MDRO Source:: VRE URINE,


Additional Past Surgical History / Comment(s): vagal nerve stimulator 2015, 

left UPJ endopylotomy, embolist surgery, heel cord lengthening, transurethral 

bladder neck and prostate incision 2020


Past Anesthesia/Blood Transfusion Reactions: No Reported Reaction


Past Psychological History: ADD/ADHD


Smoking Status: Never smoker


Past Alcohol Use History: None Reported


Past Drug Use History: None Reported





- Past Family History


  ** Mother


Additional Family Medical History / Comment(s):  of Suicide at 22





General Exam


Limitations: altered mental status, physical limitation


General appearance: alert, in no apparent distress


Head exam: Present: atraumatic, normocephalic, normal inspection


Eye exam: Present: normal appearance, PERRL, EOMI


Pupils: Present: normal accommodation


ENT exam: Present: normal exam, normal oropharynx, mucous membranes moist, TM's 

normal bilaterally, normal external ear exam


Neck exam: Present: normal inspection, full ROM


Respiratory exam: Present: normal lung sounds bilaterally.  Absent: respiratory 

distress, wheezes


Cardiovascular Exam: Present: regular rate, normal rhythm, normal heart sounds


GI/Abdominal exam: Present: soft, other (PEG tube in place.  Well anchored.).  

Absent: distended, tenderness, guarding


Extremities exam: Present: normal inspection, full ROM


Back exam: Present: normal inspection, full ROM


Neurological exam: Present: alert, other (Nonverbal)


Psychiatric exam: Present: normal affect, normal mood


Skin exam: Present: warm, dry, intact, normal color





Course


                                   Vital Signs











  20





  19:20


 


Temperature 98.0 F


 


Pulse Rate 98


 


Respiratory 18





Rate 


 


Blood Pressure 108/77


 


O2 Sat by Pulse 96





Oximetry 














Medical Decision Making





- Medical Decision Making





Patient is a 30-year-old male with extensive medical history, nonverbal, PEG 

tube dependent presenting to emergency Department with chief complaint of PEG 

tube issues.  On exam the PEG tube appears to be well anchored with no 

surrounding excoriations at the abdominal site.  PEG tube was able to be flushed

with warm water.  X-ray with contrast was performed and it showed a patent PEG 

tube.  Caregiver states the timing of the seizure had a concern but now she is 

comfortable going home and given him the rest of the medication.  Return 

parameters discussed.  Case discussed with physician.





Disposition


Clinical Impression: 


 PEG tube malfunction





Disposition: HOME SELF-CARE


Condition: Stable


Instructions (If sedation given, give patient instructions):  How to Use and 

Care for Your PEG Tube (ED)


Additional Instructions: 


Follow-up with your primary care.  Return to emergency department if symptoms 

worsen.


Is patient prescribed a controlled substance at d/c from ED?: No


Referrals: 


Micaela Bean MD [Primary Care Provider] - 1-2 days


Time of Disposition: 21:01

## 2020-07-02 NOTE — XR
EXAMINATION TYPE: XR KUB

 

DATE OF EXAM: 7/2/2020

 

COMPARISON: 3/30/2020

 

HISTORY: Checking Peg tube placement

 

 

TECHNIQUE: Single supine KUB image of the abdomen is obtained

 

FINDINGS:  

30 ml of Isovue 370 injected into Peg Tube. Contrast is noted within the stomach. No evidence for carlos
k.

 

 

IMPRESSION:  

 

1.  Appropriate PEG tube placement.

## 2020-10-09 ENCOUNTER — HOSPITAL ENCOUNTER (OUTPATIENT)
Dept: HOSPITAL 47 - LABWHC1 | Age: 30
Discharge: HOME | End: 2020-10-09
Attending: PSYCHIATRY & NEUROLOGY
Payer: MEDICARE

## 2020-10-09 DIAGNOSIS — G40.814: Primary | ICD-10-CM

## 2020-10-09 LAB
ALT SERPL-CCNC: 18 U/L (ref 10–49)
AST SERPL-CCNC: 27 U/L (ref 14–35)
BASOPHILS # BLD AUTO: 0 K/UL (ref 0–0.2)
BASOPHILS NFR BLD AUTO: 0 %
EOSINOPHIL # BLD AUTO: 0.1 K/UL (ref 0–0.7)
EOSINOPHIL NFR BLD AUTO: 1 %
ERYTHROCYTE [DISTWIDTH] IN BLOOD BY AUTOMATED COUNT: 4.04 M/UL (ref 4.3–5.9)
ERYTHROCYTE [DISTWIDTH] IN BLOOD: 15 % (ref 11.5–15.5)
HCT VFR BLD AUTO: 41.7 % (ref 39–53)
HGB BLD-MCNC: 13.7 GM/DL (ref 13–17.5)
LYMPHOCYTES # SPEC AUTO: 1.1 K/UL (ref 1–4.8)
LYMPHOCYTES NFR SPEC AUTO: 22 %
MCH RBC QN AUTO: 33.8 PG (ref 25–35)
MCHC RBC AUTO-ENTMCNC: 32.8 G/DL (ref 31–37)
MCV RBC AUTO: 103 FL (ref 80–100)
MONOCYTES # BLD AUTO: 0.3 K/UL (ref 0–1)
MONOCYTES NFR BLD AUTO: 5 %
NEUTROPHILS # BLD AUTO: 3.6 K/UL (ref 1.3–7.7)
NEUTROPHILS NFR BLD AUTO: 71 %
PLATELET # BLD AUTO: 208 K/UL (ref 150–450)
WBC # BLD AUTO: 5 K/UL (ref 3.8–10.6)

## 2020-10-09 PROCEDURE — 85025 COMPLETE CBC W/AUTO DIFF WBC: CPT

## 2020-10-09 PROCEDURE — 84450 TRANSFERASE (AST) (SGOT): CPT

## 2020-10-09 PROCEDURE — 80235 DRUG ASSAY LACOSAMIDE: CPT

## 2020-10-09 PROCEDURE — 80177 DRUG SCRN QUAN LEVETIRACETAM: CPT

## 2020-10-09 PROCEDURE — 36415 COLL VENOUS BLD VENIPUNCTURE: CPT

## 2020-10-09 PROCEDURE — 84460 ALANINE AMINO (ALT) (SGPT): CPT

## 2020-12-07 ENCOUNTER — HOSPITAL ENCOUNTER (OUTPATIENT)
Dept: HOSPITAL 47 - ORWHC2ENDO | Age: 30
Discharge: HOME | End: 2020-12-07
Payer: MEDICARE

## 2020-12-07 VITALS — TEMPERATURE: 98 F

## 2020-12-07 VITALS — DIASTOLIC BLOOD PRESSURE: 58 MMHG | RESPIRATION RATE: 20 BRPM | SYSTOLIC BLOOD PRESSURE: 97 MMHG | HEART RATE: 61 BPM

## 2020-12-07 VITALS — BODY MASS INDEX: 21.5 KG/M2

## 2020-12-07 DIAGNOSIS — F03.90: ICD-10-CM

## 2020-12-07 DIAGNOSIS — J96.90: ICD-10-CM

## 2020-12-07 DIAGNOSIS — Z87.09: ICD-10-CM

## 2020-12-07 DIAGNOSIS — J44.0: ICD-10-CM

## 2020-12-07 DIAGNOSIS — Z88.0: ICD-10-CM

## 2020-12-07 DIAGNOSIS — F73: ICD-10-CM

## 2020-12-07 DIAGNOSIS — Z79.899: ICD-10-CM

## 2020-12-07 DIAGNOSIS — F90.9: ICD-10-CM

## 2020-12-07 DIAGNOSIS — Z79.01: ICD-10-CM

## 2020-12-07 DIAGNOSIS — Z88.1: ICD-10-CM

## 2020-12-07 DIAGNOSIS — Z88.8: ICD-10-CM

## 2020-12-07 DIAGNOSIS — Z98.890: ICD-10-CM

## 2020-12-07 DIAGNOSIS — J95.03: ICD-10-CM

## 2020-12-07 DIAGNOSIS — J20.9: Primary | ICD-10-CM

## 2020-12-07 DIAGNOSIS — G40.909: ICD-10-CM

## 2020-12-07 DIAGNOSIS — T17.990A: ICD-10-CM

## 2020-12-07 DIAGNOSIS — F84.0: ICD-10-CM

## 2020-12-07 LAB — GLUCOSE BLD-MCNC: 96 MG/DL (ref 75–99)

## 2020-12-07 PROCEDURE — 87252 VIRUS INOCULATION TISSUE: CPT

## 2020-12-07 PROCEDURE — 87070 CULTURE OTHR SPECIMN AEROBIC: CPT

## 2020-12-07 PROCEDURE — 88305 TISSUE EXAM BY PATHOLOGIST: CPT

## 2020-12-07 PROCEDURE — 87116 MYCOBACTERIA CULTURE: CPT

## 2020-12-07 PROCEDURE — 87077 CULTURE AEROBIC IDENTIFY: CPT

## 2020-12-07 PROCEDURE — 87502 INFLUENZA DNA AMP PROBE: CPT

## 2020-12-07 PROCEDURE — 87529 HSV DNA AMP PROBE: CPT

## 2020-12-07 PROCEDURE — 87496 CYTOMEG DNA AMP PROBE: CPT

## 2020-12-07 PROCEDURE — 71045 X-RAY EXAM CHEST 1 VIEW: CPT

## 2020-12-07 PROCEDURE — 87206 SMEAR FLUORESCENT/ACID STAI: CPT

## 2020-12-07 PROCEDURE — 87634 RSV DNA/RNA AMP PROBE: CPT

## 2020-12-07 PROCEDURE — 87498 ENTEROVIRUS PROBE&REVRS TRNS: CPT

## 2020-12-07 PROCEDURE — 87102 FUNGUS ISOLATION CULTURE: CPT

## 2020-12-07 PROCEDURE — 88108 CYTOPATH CONCENTRATE TECH: CPT

## 2020-12-07 PROCEDURE — 31502 CHANGE OF WINDPIPE AIRWAY: CPT

## 2020-12-07 PROCEDURE — 87186 SC STD MICRODIL/AGAR DIL: CPT

## 2020-12-07 PROCEDURE — 87205 SMEAR GRAM STAIN: CPT

## 2020-12-07 PROCEDURE — 31624 DX BRONCHOSCOPE/LAVAGE: CPT

## 2020-12-07 PROCEDURE — 87798 DETECT AGENT NOS DNA AMP: CPT

## 2020-12-07 NOTE — P.PCN
Date of Procedure: 12/07/20


Anesthesia: MAC, regional


Surgeon: Gentry Kingsley


Disposition: same day


Indications for Procedure: 


Recurrent bronchitis, tracheostomy exchange, broken upper end of tracheostomy


Operative Findings: 


As below


Description of Procedure: 


Patient prepared and draped in a usual fashion informed consent obtained from 

the guardian, fiberoptic bronchoscope passed from the old #6 Shiley which is 

non-cuffed fenestrated, extensive amount of mucus plugging were seen bilaterally

along with mucosal erythematous edema DL was performed from the right lower lobe

as well as the left lower lobe tolerated well followed after bronchoscopy the 

old tracheostomy was removed and new tracheostomy #6 Shiley non-cough 

fenestrated was inserted without any difficulty secured with trach tie, after 

securing the tracheostomy inspection done to confirm well above the jose l 

stable position of tracheostomy noted chest x-ray is pending patient daughter 

procedure well no complication noted BAL was sent for Gram stain and culture and

cytology,

## 2020-12-07 NOTE — XR
EXAMINATION TYPE: XR chest 1V portable

 

DATE OF EXAM: 12/7/2020

 

COMPARISON: 3/15/2020

 

HISTORY: Post bronchoscopy

 

TECHNIQUE: Single frontal view of the chest is obtained.

 

FINDINGS:  Exam limited by positioning. Tracheostomy tube stable. Metallic device overlying the left 
chest with leads extending cephalad. No overt failure. Left lower lobe subsegmental infiltrate is imp
roved. Right lung clear. Right basilar infiltrate markedly improved. No overt failure or obvious pneu
mothorax.

 

IMPRESSION:  

1. Interval near complete resolution of bilateral lower lobe infiltrate.

## 2020-12-09 NOTE — CDI
Date: 12.09.2020



CDS/ Name: Corrine Hdz

Phone: If any questions, call Diana Cespedes  at [phone]



Patient Name: Raj Silva

Account Number: [id number]

Admit Date 12.07.20   

Discharge Date: 12.07.20

   

ATTENTION: The [facility] Coding Staff appreciate your assistance in clarifying 
documentation. Please respond to the clarification below the line at the bottom 
and electronically sign. The [facility] Coding staff will review the response and 
follow-up if needed. Please note: Queries are made part of the [employer] 
Record. If you have any questions, please contact the .



Dear Dr. Kingsley



In order to code to the greatest specificity and for the greatest reimbursement 
I need the following information:



In your procedure note you have documented the use of a fiberoptic bronchoscope 
and DL was performed FROM the right lower lobe as well as the left lower lobe.




Please clarify from where the BAL was performed



Thank you for your kind consideration.  

_____ right lower lobe____AND__ left lower lobe_______________

MTDD

## 2020-12-11 ENCOUNTER — HOSPITAL ENCOUNTER (EMERGENCY)
Dept: HOSPITAL 47 - EC | Age: 30
Discharge: HOME | End: 2020-12-11
Payer: MEDICARE

## 2020-12-11 VITALS — SYSTOLIC BLOOD PRESSURE: 95 MMHG | DIASTOLIC BLOOD PRESSURE: 57 MMHG | HEART RATE: 80 BPM

## 2020-12-11 VITALS — TEMPERATURE: 97.6 F | RESPIRATION RATE: 18 BRPM

## 2020-12-11 DIAGNOSIS — F90.9: ICD-10-CM

## 2020-12-11 DIAGNOSIS — G40.409: ICD-10-CM

## 2020-12-11 DIAGNOSIS — Z96.82: ICD-10-CM

## 2020-12-11 DIAGNOSIS — Z91.09: ICD-10-CM

## 2020-12-11 DIAGNOSIS — Z98.890: ICD-10-CM

## 2020-12-11 DIAGNOSIS — Z88.8: ICD-10-CM

## 2020-12-11 DIAGNOSIS — S40.022A: Primary | ICD-10-CM

## 2020-12-11 DIAGNOSIS — Z93.0: ICD-10-CM

## 2020-12-11 DIAGNOSIS — Z88.1: ICD-10-CM

## 2020-12-11 DIAGNOSIS — Z88.0: ICD-10-CM

## 2020-12-11 DIAGNOSIS — F84.0: ICD-10-CM

## 2020-12-11 DIAGNOSIS — Z79.899: ICD-10-CM

## 2020-12-11 DIAGNOSIS — Z79.51: ICD-10-CM
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
## 2020-12-11 DIAGNOSIS — J45.909: ICD-10-CM

## 2020-12-11 DIAGNOSIS — K59.09: ICD-10-CM

## 2020-12-11 DIAGNOSIS — Z93.1: ICD-10-CM

## 2020-12-11 PROCEDURE — 99283 EMERGENCY DEPT VISIT LOW MDM: CPT

## 2020-12-11 NOTE — ED
General Adult HPI





- General


Source: RN notes reviewed, old records reviewed, Caregiver


Mode of arrival: wheelchair


Limitations: altered mental status, physical limitation





<Jose Ferguson - Last Filed: 20 14:32>





<Soledad Treadwell - Last Filed: 20 22:34>





- General


Chief complaint: Extremity Injury, Upper


Stated complaint: lt arm injury


Time Seen by Provider: 20 13:07





- History of Present Illness


Initial comments: 


30-year-old male patient to ED for evaluation of possible left elbow injury.  

Patient is developmentally delayed.  Caregiver believes the patient may have 

fallen last night as she noticed a bruise on his elbow today.  Otherwise no 

signs of external trauma noted.  No fall was witnessed.  Patient did not appear 

to be any pain is acting and using all extremities at baseline.  Ambulatory 

without difficulty.





Systemic: Pt denies fatigue, fever/chills, rash. Pt denies weakness, night s

weats, weight loss. 


Neuro: Pt denies headache, visual disturbances, syncope or pre-syncope.


HEENT: Pt denies ocular discharge or irritation, otalgia, rhinorrhea, 

pharyngitis or notable lymphadenopathy. 


Cardiopulmonary: Pt denies chest pain, SOB, heart palpitations, dyspnea on 

exertion.  


Abdominal/GI: Pt denies abdominal pain, n/v/d. 


: Pt denies dysuria, burning w/ urination, frequency/urgency. Denies new onset

urinary or bowel incontinence.  


MSK: Pt denies myalgia, loss of strength or function in extremities. 


Neuro: Pt denies new onset weakness, paresthesias. 


 (Jose Ferguson)





- Related Data


                                Home Medications











 Medication  Instructions  Recorded  Confirmed


 


Clobazam [Onfi] 20 mg PEG/G-TUBE BID 16


 


Lacosamide [Vimpat] 200 mg PEG/G-TUBE TID 16


 


Ascorbic Acid [Vitamin C] 500 mg PEG/G-TUBE BID 19


 


Folic Acid 1 mg PEG/G-TUBE DAILY 19


 


Vitamin B Complex 1 cap PEG/G-TUBE DAILY 19


 


clonazePAM [KlonoPIN] 1 mg PEG/G-TUBE TID 19


 


levETIRAcetam [Keppra Oral 16 ml PEG/G-TUBE Q8H 19





Solution]   


 


polyethylene glycoL 3350 [Miralax] 17 gm PEG/G-TUBE TID 19


 


Albuterol Nebulized [Ventolin 2.5 mg INHALATION RT-BID 20





Nebulized]   


 


Dexlansoprazole [Dexilant] 60 mg PEG/G-TUBE DAILY 20


 


Tamsulosin [Flomax] 0.4 mg PEG/G-TUBE DAILY 20


 


Na Phos,M-B/Na Phos,Di-Ba [Fleet 133 ml RECTAL ONCE PRN 03/15/20 12/07/20





Adult]   


 


Rufinamide [Banzel] 1,600 mg PO 0000 03/15/20 12/07/20


 


Cbd Capsule 750 mg PEG/G-TUBE DAILY 20


 


Lactulose 45 ml PO TID 20


 


Rufinamide [Banzel] 1,200 mg PO 1200 20


 


Vitamin B6 100 mg PO HS 20








                                  Previous Rx's











 Medication  Instructions  Recorded


 


Scopolamine [Scopolamine 1 MG/72 1 patch TRANSDERM Q72H #10 patch 20





HR patch]  











                                    Allergies











Allergy/AdvReac Type Severity Reaction Status Date / Time


 


amoxicillin trihydrate Allergy  Unknown Verified 20 13:05





[From Augmentin]     


 


ceftriaxone sodium Allergy  Unknown Verified 20 13:05





[From Rocephin]     


 


cephalexin monohydrate Allergy  Unknown Verified 20 13:05





[From Keflex]     


 


Penicillins Allergy  Unknown Verified 20 13:05


 


phenobarbital Allergy  Unknown Verified 20 13:05


 


phenytoin sodium Allergy  Unknown Verified 20 13:05





[From Dilantin]     


 


phenytoin sodium extended Allergy  Unknown Verified 20 13:05





[From Dilantin]     


 


potassium clavulanate Allergy  Unknown Verified 20 13:05





[From Augmentin]     


 


Cephalosporins AdvReac  Unknown Verified 20 13:05


 


lorazepam [From Ativan] AdvReac  Unknown Verified 20 13:05


 


surgical tape AdvReac  hyperpigmentation Uncoded 20 13:05





   of skin  














Review of Systems


ROS Other: All systems not noted in ROS Statement are negative.





<Jose Ferguson - Last Filed: 20 14:32>


ROS Other: All systems not noted in ROS Statement are negative.





<MileSoledad YUMIKO - Last Filed: 20 22:34>


ROS Statement: 


Those systems with pertinent positive or pertinent negative responses have been 

documented in the HPI.








Past Medical History


Past Medical History: Asthma, Renal Disease, Seizure Disorder


Additional Past Medical History / Comment(s): Developmental delay, Autism, daily

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 Hx 

ulcers. Tenses up, clenches fists, shakes, a lot, screeches when excited, has a 

strong upper body. Arms  are permanently bent, he walks on toes. "Flight Risk."


History of Any Multi-Drug Resistant Organisms: VRE


Date of last positivie culture/infection: 3/15/20


MDRO Source:: VRE URINE


Additional Past Surgical History / Comment(s): Vagal nerve stimulator, left UPJ 

endopylotomy, embolist surgery (renal artery), heel cord lengthening, 

transurethral bladder neck and prostate incision 2020. Traceostomy. PEG 

Tube palced. Exploratory Laprotomy.


Past Anesthesia/Blood Transfusion Reactions: No Reported Reaction


Past Psychological History: ADD/ADHD


Smoking Status: Never smoker


Past Alcohol Use History: None Reported


Past Drug Use History: None Reported





- Past Family History


  ** Mother


Additional Family Medical History / Comment(s):  of Suicide at 22.





<Jose Ferguson - Last Filed: 20 14:32>





General Exam


Limitations: altered mental status, physical limitation





<Jose Ferguson - Last Filed: 20 14:32>





- General Exam Comments


Initial Comments: 








Constitutional: NAD, AOX3, Pt has pleasant affect. 


HEENT: NC/AT, trachea midline, neck supple, no lymphadenopathy. External ears 

appear normal, without discharge. Mucous membranes moist. Eyes PERRLA, EOM 

intact. There is no scleral icterus. No pallor noted. 


Cardiopulmonary: RRR, no murmurs, rubs or gallops, no JVD noted. Lungs CTAB in 

anterior and posterior fields. No peripheral edema. 


Abdominal exam: Abdomen soft and non-distended. Abdomen non-tender to palpation 

in all 4 quadrants. Bowel sounds active in LLQ. No hepatosplenomegaly. No 

ecchymosis


Neuro: CN II-XII grossly intact. No nuchal rigidity. No raccon eyes, no roberts 

sign, no hemotympanum. No cervical spinal tenderness. 


MSK: Small bruise noted to the medial aspect of the left elbow. Full active ROM 

in upper lower extremities.  No areas of tenderness.  Full active ROM in upper 

and lower extremities.





 (Jose Ferguson)





Course





                                   Vital Signs











  20





  13:01 14:45


 


Temperature 97.6 F 97.6 F


 


Pulse Rate 74 80


 


Respiratory 18 18





Rate  


 


Blood Pressure 106/61 95/57


 


O2 Sat by Pulse 99 96





Oximetry  














Medical Decision Making





<Jose Ferguson - Last Filed: 20 14:32>





<Soledad Treadwell - Last Filed: 20 22:34>





- Medical Decision Making





30-year-old male patient to ED for evaluation of possible injury to his left 

elbow.  Plain films are negative.  No other signs of external trauma is noted.  

Patient discharged outpatient follow-up and return precautions.  Case discussed 

with Dr. Treadwell. 





 (Jose Ferguson)


I was available for consultation in the emergency department.  The history and 

physical exam were done by the midlevel provider. I was consulted for this 

patients care. I reviewed the case with the midlevel provider and based on 

their presentation of the patient, I agree with the assessment, medical decision

making and plan of care as documented. 


Chart was dictated using Dragon dictation software.  Attempts were made to 

correct any dictation errors however some typographical errors may persist. 


Patient was seen during a national state of emergency due to the Covid-19 

pandemic. 


 (Soledad Treadwell)





Disposition


Is patient prescribed a controlled substance at d/c from ED?: No





<Jose Ferguson - Last Filed: 20 14:32>





<Soledad Treadwell - Last Filed: 20 22:34>


Clinical Impression: 


 Arm bruise





Disposition: HOME SELF-CARE


Condition: Stable


Instructions (If sedation given, give patient instructions):  Fall Prevention 

(ED)


Additional Instructions: 


Follow up with PCP tomorrow. Return to ED with any worsening symptoms. 


Referrals: 


Micaela Bean MD [Primary Care Provider] - 1-2 days

## 2020-12-11 NOTE — XR
Left forearm and left elbow

 

HISTORY: Trauma and pain

 

3 views of the left elbow and 2 views the left forearm

 

Bone mineralization, joint spaces and alignment are maintained. No evident elbow joint effusion. Ther
e is mild arthropathy change present within the elbow,  views are nonstandard.

 

IMPRESSION: No acute fracture or dislocation is evident.

## 2021-01-20 ENCOUNTER — HOSPITAL ENCOUNTER (OUTPATIENT)
Dept: HOSPITAL 47 - LABWHC1 | Age: 31
Discharge: HOME | End: 2021-01-20
Attending: PSYCHIATRY & NEUROLOGY
Payer: MEDICARE

## 2021-01-20 DIAGNOSIS — Z79.899: ICD-10-CM

## 2021-01-20 DIAGNOSIS — G40.814: Primary | ICD-10-CM

## 2021-01-20 LAB
ALT SERPL-CCNC: 25 U/L (ref 10–49)
AST SERPL-CCNC: 66 U/L (ref 14–35)

## 2021-01-20 PROCEDURE — 36415 COLL VENOUS BLD VENIPUNCTURE: CPT

## 2021-01-20 PROCEDURE — 82565 ASSAY OF CREATININE: CPT

## 2021-01-20 PROCEDURE — 84450 TRANSFERASE (AST) (SGOT): CPT

## 2021-01-20 PROCEDURE — 84460 ALANINE AMINO (ALT) (SGPT): CPT

## 2021-02-09 ENCOUNTER — HOSPITAL ENCOUNTER (EMERGENCY)
Dept: HOSPITAL 47 - EC | Age: 31
Discharge: HOME | End: 2021-02-09
Payer: MEDICARE

## 2021-02-09 VITALS — HEART RATE: 74 BPM | TEMPERATURE: 98.7 F | DIASTOLIC BLOOD PRESSURE: 57 MMHG | SYSTOLIC BLOOD PRESSURE: 103 MMHG

## 2021-02-09 VITALS — RESPIRATION RATE: 20 BRPM

## 2021-02-09 DIAGNOSIS — Z88.1: ICD-10-CM

## 2021-02-09 DIAGNOSIS — Z91.048: ICD-10-CM

## 2021-02-09 DIAGNOSIS — J45.909: ICD-10-CM

## 2021-02-09 DIAGNOSIS — Z79.899: ICD-10-CM

## 2021-02-09 DIAGNOSIS — G40.909: Primary | ICD-10-CM

## 2021-02-09 DIAGNOSIS — Z79.51: ICD-10-CM

## 2021-02-09 DIAGNOSIS — Z88.8: ICD-10-CM

## 2021-02-09 DIAGNOSIS — F90.9: ICD-10-CM

## 2021-02-09 DIAGNOSIS — Z88.0: ICD-10-CM

## 2021-02-09 DIAGNOSIS — F84.0: ICD-10-CM

## 2021-02-09 LAB
ALBUMIN SERPL-MCNC: 4.5 G/DL (ref 3.5–5)
ALP SERPL-CCNC: 115 U/L (ref 38–126)
ALT SERPL-CCNC: 17 U/L (ref 4–49)
ANION GAP SERPL CALC-SCNC: 11 MMOL/L
AST SERPL-CCNC: 27 U/L (ref 17–59)
BASOPHILS # BLD AUTO: 0 K/UL (ref 0–0.2)
BASOPHILS NFR BLD AUTO: 0 %
BUN SERPL-SCNC: 21 MG/DL (ref 9–20)
CALCIUM SPEC-MCNC: 9.6 MG/DL (ref 8.4–10.2)
CHLORIDE SERPL-SCNC: 110 MMOL/L (ref 98–107)
CO2 SERPL-SCNC: 21 MMOL/L (ref 22–30)
EOSINOPHIL # BLD AUTO: 0.1 K/UL (ref 0–0.7)
EOSINOPHIL NFR BLD AUTO: 1 %
ERYTHROCYTE [DISTWIDTH] IN BLOOD BY AUTOMATED COUNT: 3.96 M/UL (ref 4.3–5.9)
ERYTHROCYTE [DISTWIDTH] IN BLOOD: 15.5 % (ref 11.5–15.5)
GLUCOSE SERPL-MCNC: 98 MG/DL (ref 74–99)
HCT VFR BLD AUTO: 39.4 % (ref 39–53)
HGB BLD-MCNC: 13.1 GM/DL (ref 13–17.5)
LYMPHOCYTES # SPEC AUTO: 0.8 K/UL (ref 1–4.8)
LYMPHOCYTES NFR SPEC AUTO: 13 %
MAGNESIUM SPEC-SCNC: 2.2 MG/DL (ref 1.6–2.3)
MCH RBC QN AUTO: 33.1 PG (ref 25–35)
MCHC RBC AUTO-ENTMCNC: 33.3 G/DL (ref 31–37)
MCV RBC AUTO: 99.5 FL (ref 80–100)
MONOCYTES # BLD AUTO: 0.3 K/UL (ref 0–1)
MONOCYTES NFR BLD AUTO: 5 %
NEUTROPHILS # BLD AUTO: 5.1 K/UL (ref 1.3–7.7)
NEUTROPHILS NFR BLD AUTO: 80 %
PLATELET # BLD AUTO: 203 K/UL (ref 150–450)
POTASSIUM SERPL-SCNC: 3.7 MMOL/L (ref 3.5–5.1)
PROT SERPL-MCNC: 7.4 G/DL (ref 6.3–8.2)
SODIUM SERPL-SCNC: 142 MMOL/L (ref 137–145)
WBC # BLD AUTO: 6.4 K/UL (ref 3.8–10.6)

## 2021-02-09 PROCEDURE — 93005 ELECTROCARDIOGRAM TRACING: CPT

## 2021-02-09 PROCEDURE — 71045 X-RAY EXAM CHEST 1 VIEW: CPT

## 2021-02-09 PROCEDURE — 80053 COMPREHEN METABOLIC PANEL: CPT

## 2021-02-09 PROCEDURE — 85025 COMPLETE CBC W/AUTO DIFF WBC: CPT

## 2021-02-09 PROCEDURE — 99284 EMERGENCY DEPT VISIT MOD MDM: CPT

## 2021-02-09 PROCEDURE — 74018 RADEX ABDOMEN 1 VIEW: CPT

## 2021-02-09 PROCEDURE — 83735 ASSAY OF MAGNESIUM: CPT

## 2021-02-09 NOTE — XR
EXAM: Abdomen radiograph.

 

HISTORY: Pain/constipation.

 

TECHNIQUE: Supine AP view.

 

COMPARISON: 72,020.

 

FINDINGS:

 

There are nondilated bowel loops with a nonobstructive pattern. There are no pathologic calcification
s. No acute osseous abnormality seen. There is large colonic stool burden. Gastric tube is seen. Post
surgical changes overlying the left upper quadrant seen.

 

IMPRESSION:

 

Nonobstructive bowel gas pattern.

 

Large colonic stool burden.

## 2021-02-09 NOTE — XR
EXAMINATION TYPE: XR chest 1V portable

 

DATE OF EXAM: 2/9/2021

 

COMPARISON: NONE

 

HISTORY: 12/7/2020.

 

TECHNIQUE: Single frontal view of the chest is obtained.

 

FINDINGS:  There is mild bibasilar hazy opacity. No pleural effusion, or pneumothorax seen.  The card
iac silhouette size is within normal limits.  Tracheostomy tube with tip overlying the proximal intra
thoracic trachea. Neurostimulator device with leads along the cervical spine seen. The osseous struct
ures are intact.

 

IMPRESSION:  Mild bibasilar opacities, favor atelectasis.

## 2021-02-09 NOTE — ED
Seizure HPI





- General


Chief Complaint: Seizure


Stated Complaint: Seizure


Time Seen by Provider: 21 17:55


Source: EMS


Limitations: altered mental status, physical limitation





- History of Present Illness


Initial Comments: 


Is a 31-year-old male with a history of epilepsy tracheostomy.  He is on 

multiple medications including Keppra, Klonopin, on feet, other meds in order to

control his seizures.  Despite these medications he still has seizures about 5 

or 6 times a day.  The patient has also been having some constipation.  The 

caregiver at bedside states that he had difficult having bowel movements today. 

She states that she gave him his oral antiepileptic medications however he 

subsequently vomited these up.  After that the patient proceeded to have 

multiple seizures throughout an hour.  She states that the seizures lasted only 

a matter of seconds to minutes however he still had multiple of them over the 

last hour.  In one of his seizures he did pull out his tracheostomy tube seizure

brought to the emergency department.  She states that prior to him coming he did

have a large bowel movement and was able to keep down his afternoon medications.

 The patient has had reduction in his seizures since that time.  There is been 

no fevers or chills.  No cough.  No other acute complaints.








- Related Data


                                Home Medications











 Medication  Instructions  Recorded  Confirmed


 


Clobazam [Onfi] 20 mg PEG/G-TUBE BID 16


 


Lacosamide [Vimpat] 200 mg PEG/G-TUBE TID 16


 


Ascorbic Acid [Vitamin C] 500 mg PEG/G-TUBE BID 19


 


Folic Acid 1 mg PEG/G-TUBE DAILY 19


 


clonazePAM [KlonoPIN] 1 mg PEG/G-TUBE TID 19


 


levETIRAcetam [Keppra Oral 16 ml PEG/G-TUBE Q8H 19





Solution]   


 


polyethylene glycoL 3350 [Miralax] 17 gm PEG/G-TUBE TID 19


 


Albuterol Nebulized [Ventolin 2.5 mg INHALATION RT-BID 20





Nebulized]   


 


Dexlansoprazole [Dexilant] 60 mg PEG/G-TUBE DAILY 20


 


Tamsulosin [Flomax] 0.4 mg PEG/G-TUBE DAILY 20


 


Cbd Capsule 750 mg PEG/G-TUBE DAILY 20


 


Lactulose 45 ml PO TID 20


 


Banzel 400mg 1,200 mg PO DAILY@1200 21


 


Banzel 400mg 1,600 mg PO HS@0000 21


 


Cannabidiol (Cbd) [Epidiolex] 25 mg PO AS DIRECTED 21


 


Pyridoxine HCl (Vitamin B6) 100 mg PO HS@0000 21





[Vitamin B-6]   








                                  Previous Rx's











 Medication  Instructions  Recorded


 


Scopolamine [Scopolamine 1 MG/72 1 patch TRANSDERM Q72H #10 patch 20





HR patch]  











                                    Allergies











Allergy/AdvReac Type Severity Reaction Status Date / Time


 


amoxicillin trihydrate Allergy  Unknown Verified 21 21:47





[From Augmentin]     


 


ceftriaxone sodium Allergy  Unknown Verified 21 21:47





[From Rocephin]     


 


cephalexin monohydrate Allergy  Unknown Verified 21 21:47





[From Keflex]     


 


Penicillins Allergy  Unknown Verified 21 21:47


 


phenobarbital Allergy  Unknown Verified 21 21:47


 


phenytoin sodium Allergy  Unknown Verified 21 21:47





[From Dilantin]     


 


phenytoin sodium extended Allergy  Unknown Verified 21 21:47





[From Dilantin]     


 


potassium clavulanate Allergy  Unknown Verified 21 21:47





[From Augmentin]     


 


Cephalosporins AdvReac  Unknown Verified 21 21:47


 


lorazepam [From Ativan] AdvReac  Unknown Verified 21 21:47


 


surgical tape AdvReac  hyperpigmentation Uncoded 20 13:05





   of skin  














Review of Systems


ROS Statement: 


Those systems with pertinent positive or pertinent negative responses have been 

documented in the HPI.





ROS Other: All systems not noted in ROS Statement are negative.





Past Medical History


Past Medical History: Asthma, Renal Disease, Seizure Disorder


Additional Past Medical History / Comment(s): Developmental delay, Autism, daily

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 Hx 

ulcers. Tenses up, clenches fists, shakes, a lot, screeches when excited, has a 

strong upper body. Arms  are permanently bent, he walks on toes. "Flight Risk."


History of Any Multi-Drug Resistant Organisms: VRE


Date of last positivie culture/infection: 3/15/20


MDRO Source:: VRE URINE


Additional Past Surgical History / Comment(s): Vagal nerve stimulator, left UPJ 

endopylotomy, embolist surgery (renal artery), heel cord lengthening, 

transurethral bladder neck and prostate incision 2020. Traceostomy. PEG 

Tube palced. Exploratory Laprotomy.


Past Anesthesia/Blood Transfusion Reactions: No Reported Reaction


Past Psychological History: ADD/ADHD


Smoking Status: Never smoker


Past Alcohol Use History: None Reported


Past Drug Use History: None Reported





- Past Family History


  ** Mother


Additional Family Medical History / Comment(s):  of Suicide at 22.





General Exam





- General Exam Comments


Initial Comments: 


Constitutional: [Awake alert] [Appears comfortable]


Head: [Normocephalic atraumatic] 


Eyes: [no conjunctival injection] [No scleral icterus] [EOMI]


Neck: [No JVD] [Supple], Tracheostomy present however the tube is been removed 

from the hole


Heart: [Regular rate rhythm] [normal S1-S2] [no murmurs]


Lungs: [Clear to auscultation bilaterally] [No wheezing] [No rales]


Abdomen: [Soft] [nondistended] [nontender]


Extremities: [Non edematous] [DP pulses intact] [Radial pulses intact]


Neuro: Patient is awake and alert and at baseline, does not answer questions 

which is baseline, moving extremities, no seizure activity seen [No focal 

neurologic deficits]


Psych: [Appropriate mood and affect]








Limitations: altered mental status, physical limitation





Course


                                   Vital Signs











  21





  17:38 21:26 22:08


 


Temperature 99.9 F H 98.5 F 98.7 F


 


Pulse Rate 89 76 74


 


Respiratory 20 20 20





Rate   


 


Blood Pressure 120/75 95/53 103/57


 


O2 Sat by Pulse 98 97 98





Oximetry   














- Reevaluation(s)


Reevaluation #1: 


EKG showing normal sinus rhythm with rate 76.  This normalizes second 

centimeters to emergency.  QTC is 420.  Other intervals normal.  No ectopy.


21 22:59








Medical Decision Making





- Medical Decision Making


This a 31-year-old male who presents emergency department for seizure.  The 

patient is a history of constipation which causes him to vomit.  He did vomit up

his seizure medications earlier today.  The mother feels that the reason he was 

seizing is because he did not get his seizure medications.Patient's temp was 

99.9 on arrival however otherwise the rest of his blood work was unremarkable.  

I did ask her mother about fevers at home and she stated he has not had any.  I 

asked about increased cough or secretions from the tracheostomy she states that 

he has not had any of that as well.  He has not indicated this had any 

discomfort with urination.  Patient does not have a white blood cell count.  

Blood work otherwise was unremarkable as well.  Vitals were rechecked and the 

patient was afebrile.  The mother refused urinalysis and straight 

catheterization.  I explained to her that I could not rule out a urinary tract 

infection as the cause for the seizure however she stated that she understood 

this and one to take him home.  She stated that she would monitor for signs of 

fever or any other concerns.  All questions were answered.








- Lab Data


Result diagrams: 


                                 21 19:02





                                 21 19:02


                                   Lab Results











  21 Range/Units





  19:02 19:02 


 


WBC  6.4   (3.8-10.6)  k/uL


 


RBC  3.96 L   (4.30-5.90)  m/uL


 


Hgb  13.1   (13.0-17.5)  gm/dL


 


Hct  39.4   (39.0-53.0)  %


 


MCV  99.5   (80.0-100.0)  fL


 


MCH  33.1   (25.0-35.0)  pg


 


MCHC  33.3   (31.0-37.0)  g/dL


 


RDW  15.5   (11.5-15.5)  %


 


Plt Count  203   (150-450)  k/uL


 


MPV  6.6   


 


Neutrophils %  80   %


 


Lymphocytes %  13   %


 


Monocytes %  5   %


 


Eosinophils %  1   %


 


Basophils %  0   %


 


Neutrophils #  5.1   (1.3-7.7)  k/uL


 


Lymphocytes #  0.8 L   (1.0-4.8)  k/uL


 


Monocytes #  0.3   (0-1.0)  k/uL


 


Eosinophils #  0.1   (0-0.7)  k/uL


 


Basophils #  0.0   (0-0.2)  k/uL


 


Poikilocytosis  Slight   


 


Macrocytosis  Slight   


 


Sodium   142  (137-145)  mmol/L


 


Potassium   3.7  (3.5-5.1)  mmol/L


 


Chloride   110 H  ()  mmol/L


 


Carbon Dioxide   21 L  (22-30)  mmol/L


 


Anion Gap   11  mmol/L


 


BUN   21 H  (9-20)  mg/dL


 


Creatinine   0.84  (0.66-1.25)  mg/dL


 


Est GFR (CKD-EPI)AfAm   >90  (>60 ml/min/1.73 sqM)  


 


Est GFR (CKD-EPI)NonAf   >90  (>60 ml/min/1.73 sqM)  


 


Glucose   98  (74-99)  mg/dL


 


Calcium   9.6  (8.4-10.2)  mg/dL


 


Magnesium   2.2  (1.6-2.3)  mg/dL


 


Total Bilirubin   0.4  (0.2-1.3)  mg/dL


 


AST   27  (17-59)  U/L


 


ALT   17  (4-49)  U/L


 


Alkaline Phosphatase   115  ()  U/L


 


Total Protein   7.4  (6.3-8.2)  g/dL


 


Albumin   4.5  (3.5-5.0)  g/dL














Disposition


Clinical Impression: 


 Focal seizure





Disposition: HOME SELF-CARE


Condition: Stable


Instructions (If sedation given, give patient instructions):  Recurrent Seizures

in Adults (ED)


Is patient prescribed a controlled substance at d/c from ED?: No


Referrals: 


Micaela Bean MD [Primary Care Provider] - 1-2 days

## 2021-03-10 ENCOUNTER — HOSPITAL ENCOUNTER (EMERGENCY)
Dept: HOSPITAL 47 - EC | Age: 31
Discharge: HOME | End: 2021-03-10
Payer: MEDICARE

## 2021-03-10 VITALS — DIASTOLIC BLOOD PRESSURE: 69 MMHG | RESPIRATION RATE: 18 BRPM | HEART RATE: 80 BPM | SYSTOLIC BLOOD PRESSURE: 106 MMHG

## 2021-03-10 VITALS — TEMPERATURE: 98.1 F

## 2021-03-10 DIAGNOSIS — Z79.899: ICD-10-CM

## 2021-03-10 DIAGNOSIS — Z43.0: Primary | ICD-10-CM

## 2021-03-10 DIAGNOSIS — J45.909: ICD-10-CM

## 2021-03-10 DIAGNOSIS — G40.909: ICD-10-CM

## 2021-03-10 DIAGNOSIS — F90.9: ICD-10-CM

## 2021-03-10 DIAGNOSIS — Z88.8: ICD-10-CM

## 2021-03-10 DIAGNOSIS — Z91.048: ICD-10-CM

## 2021-03-10 DIAGNOSIS — F84.0: ICD-10-CM

## 2021-03-10 DIAGNOSIS — Z88.1: ICD-10-CM

## 2021-03-10 DIAGNOSIS — Z88.0: ICD-10-CM

## 2021-03-10 PROCEDURE — 99283 EMERGENCY DEPT VISIT LOW MDM: CPT

## 2021-03-10 NOTE — ED
General Adult HPI





- General


Chief complaint: Shortness of Breath


Stated complaint: pulled trach out


Time Seen by Provider: 03/10/21 18:39


Source: family, RN notes reviewed, Caregiver


Mode of arrival: wheelchair


Limitations: no limitations





- History of Present Illness


Initial comments: 





Patient is a pleasant 31-year-old male presenting to the emergency department 

with caretaker secondary to treat been pulled out.  Patient does have trach 

secondary to status epilepticus and being placed on a vent.  Patient still has 

secretions and therefore trach was placed.  Today patient was found with trach 

out.  Unclear patient pulled it out or fell out.  Patient otherwise doing well. 

Patient has limited verbal ability and unable to state any complaints.





- Related Data


                                Home Medications











 Medication  Instructions  Recorded  Confirmed


 


Clobazam [Onfi] 20 mg PEG/G-TUBE BID@1200,0000 16


 


Lacosamide [Vimpat] 200 mg PEG/G-TUBE 16





 TID@1000,1700,0000  


 


Ascorbic Acid [Vitamin C] 500 mg PEG/G-TUBE BID@1000,1700 19


 


Folic Acid 1 mg PEG/G-TUBE DAILY@1000 19


 


clonazePAM [KlonoPIN] 1 mg PEG/G-TUBE TID@1000,1700,0000 19


 


levETIRAcetam [Keppra Oral 18 ml PEG/G-TUBE TID@1000,1700,0000 19





Solution]   


 


polyethylene glycoL 3350 [Miralax] 17 gm PEG/G-TUBE TID@1000,1700,0000 19


 


Albuterol Nebulized [Ventolin 2.5 mg INHALATION RT-TID PRN 20





Nebulized]   


 


Dexlansoprazole [Dexilant] 60 mg PEG/G-TUBE DAILY@1200 20


 


Tamsulosin [Flomax] 0.4 mg PEG/G-TUBE DAILY@1200 20


 


Cbd Capsule 750 mg PEG/G-TUBE DAILY@1200 20


 


Lactulose 30 gm PO TID@1000,1700,0000 20


 


Banzel 400mg 1,200 mg PO DAILY@1200 21


 


Banzel 400mg 1,600 mg PO HS@0000 21


 


Cannabidiol (Cbd) [Epidiolex] 25 mg PO AS DIRECTED 21


 


Pyridoxine HCl (Vitamin B6) 100 mg PO HS@0000 21





[Vitamin B-6]   








                                  Previous Rx's











 Medication  Instructions  Recorded


 


Scopolamine [Scopolamine 1 MG/72 1 patch TRANSDERM Q72H #10 patch 20





HR patch]  











                                    Allergies











Allergy/AdvReac Type Severity Reaction Status Date / Time


 


amoxicillin trihydrate Allergy  Unknown Verified 21 21:47





[From Augmentin]     


 


ceftriaxone sodium Allergy  Unknown Verified 21 21:47





[From Rocephin]     


 


cephalexin monohydrate Allergy  Unknown Verified 21 21:47





[From Keflex]     


 


Penicillins Allergy  Unknown Verified 21 21:47


 


phenobarbital Allergy  Unknown Verified 21 21:47


 


phenytoin sodium Allergy  Unknown Verified 21 21:47





[From Dilantin]     


 


phenytoin sodium extended Allergy  Unknown Verified 21 21:47





[From Dilantin]     


 


potassium clavulanate Allergy  Unknown Verified 21 21:47





[From Augmentin]     


 


Cephalosporins AdvReac  Unknown Verified 21 21:47


 


lorazepam [From Ativan] AdvReac  Unknown Verified 21 21:47


 


surgical tape AdvReac  hyperpigmentation Uncoded 20 13:05





   of skin  














Review of Systems


ROS Statement: 


Those systems with pertinent positive or pertinent negative responses have been 

documented in the HPI.





Limitations: ROS unobtainable due to patients medical condition





Past Medical History


Past Medical History: Asthma, Renal Disease, Seizure Disorder


Additional Past Medical History / Comment(s): Developmental delay, Autism, daily

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 Hx 

ulcers. Tenses up, clenches fists, shakes, a lot, screeches when excited, has a 

strong upper body. Arms  are permanently bent, he walks on toes. "Flight Risk."


History of Any Multi-Drug Resistant Organisms: VRE


Date of last positivie culture/infection: 3/15/20


MDRO Source:: VRE URINE


Additional Past Surgical History / Comment(s): Vagal nerve stimulator, left UPJ 

endopylotomy, embolist surgery (renal artery), heel cord lengthening, 

transurethral bladder neck and prostate incision 2020. Traceostomy. PEG 

Tube palced. Exploratory Laprotomy.


Past Anesthesia/Blood Transfusion Reactions: No Reported Reaction


Past Psychological History: ADD/ADHD


Smoking Status: Never smoker


Past Alcohol Use History: None Reported


Past Drug Use History: None Reported





- Past Family History


  ** Mother


Additional Family Medical History / Comment(s):  of Suicide at 22.





General Exam


Limitations: no limitations


General appearance: alert, in no apparent distress


Head exam: Present: other (Patient does have a helmet on)


Eye exam: Present: normal appearance


Neck exam: Present: other (Ostomy site open without bleeding or obstruction.)


Respiratory exam: Present: normal lung sounds bilaterally


Cardiovascular Exam: Present: regular rate, normal rhythm


GI/Abdominal exam: Present: soft.  Absent: tenderness


Extremities exam: Present: normal inspection


Neurological exam: Present: alert


Psychiatric exam: Present: normal affect, normal mood


Skin exam: Present: normal color





Course





                                   Vital Signs











  03/10/21





  18:26


 


Temperature 98.1 F


 


Pulse Rate 73


 


Respiratory 16





Rate 


 


Blood Pressure 126/105


 


O2 Sat by Pulse 94 L





Oximetry 














Procedures





- Procedures


Initial comment: 





Trach was replaced with a #6.  Lubrication was applied.  Inner cannula replaced.

 No complications.  Patient is breathing well following procedure.





Disposition


Clinical Impression: 


 Encounter for tracheostomy tube change





Disposition: HOME SELF-CARE


Condition: Stable


Additional Instructions: 


Please follow-up with primary care physician in the next day or 2 for recheck.  

Please also follow-up with your doctor, Dr. Kingsley in the next couple of days.  

Return for difficulty breathing, problems with tracheostomy, or other concerns.


Is patient prescribed a controlled substance at d/c from ED?: No


Referrals: 


Micaela Bean MD [Primary Care Provider] - 1-2 days


Time of Disposition: 19:02

## 2021-06-02 NOTE — P.GSCN
History of Present Illness


Consult date: 20


Reason for Consult: 





Bilateral hydronephrosi


History of present illness: 


The patient is a 30-year-old male with a history of mental retardation who was 

admitted on 3/15/2020 for evaluation of abdominal bloating and vomiting.  He has

a history of chronic constipation and had not had a bowel movement in several 

days.  He also had increasing difficulty with voiding and had not voided since 

the previous day.  CT scan of the abdomen and pelvis with IV contrast showed 

bilateral hydronephrosis and a partially distended bladder.  Marked fecal stasis

was present. A catheter was placed in the emergency room and drained 950 cc of 

urine.  BUN/creatinine at the time of admission were 13/0.74.  The patient's 

fecal impaction has been treated with a combination of laxatives and enemas but 

he still has a large amount of stool in the lower colon based on a noncontrast 

CT scan of the abdomen and pelvis performed on 3/18.  He has remained afebrile. 

Urine culture from the catheter placed in the emergency room grew group D 

enterococcus.  I was asked to see the patient due to the bilateral 

hydronephrosis noted on the CT scan.  The history is from a conversation with 

the patient's aunt who is his caregiver and a review of my office records.





The patient has a history of infrequent voiding which began over 1 year ago.  

This was partly related to chronic constipation and has been treated on several 

occasions with in and out catheterization.  Renal ultrasound in 2019 showed 

bilateral hydronephrosis which was worse on the left side.  A MAG3 renogram with

Lasix washout showed excretion from both kidneys and it was my feeling that the 

hydronephrosis was related to chronic bladder distention from infrequent 

voiding.  The patient was seen at [facility] due to his chronic 

constipation but his problem continued despite their recommendations.  He also 

continued to have increasing difficulty in voiding.  When seen by me on  the patient was voiding every 18-27 hours.  I initially suggested that the 

patient have a trial of intermittent catheterization but this proved to be 

difficult for the patient's aunt to perform.  Cystoscopy on  showed a very 

tight bladder neck which appeared to be the source of the difficulty in 

catheterization.  I performed transurethral incision of the vesical neck and 

prostate on 2020.  According to the patient's aunt he had been voiding much

better following the procedure and was able to void 3 times daily.  His problem 

with worsening constipation has resulted in infrequent voiding which culminated 

with his return to the emergency room.





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 

junction obstruction which was treated with endopyelotomy in [place] in . 

A nonobstructive calculus was noted in the left kidney at that time. 








Review of Systems


ROS unobtainable: due to mental status





- Constitutional


Denies fever





- Gastrointestinal


Reports as per HPI, Reports constipation





- Genitourinary


Reports as per HPI





Past Medical History


Past Medical History: Asthma, Respiratory Disorder, Seizure Disorder


Additional Past Medical History / Comment(s): developmental delay, autism, last 

sz last night partial complex, trach, intra abdominal wall abcess


History of Any Multi-Drug Resistant Organisms: None Reported


Additional Past Surgical History / Comment(s): vagal nerve stimulator 2015, 

left UPJ endopylotomy, embolist surgery, heel cord lengthening, transurethral 

bladder neck and prostate incision 2020


Past Anesthesia/Blood Transfusion Reactions: No Reported Reaction


Past Psychological History: ADD/ADHD


Smoking Status: Never smoker


Past Alcohol Use History: None Reported


Past Drug Use History: None Reported





- Past Family History


  ** Mother


Additional Family Medical History / Comment(s):  of Suicide at 22





Medications and Allergies


                                Home Medications











 Medication  Instructions  Recorded  Confirmed  Type


 


Clobazam [Onfi] 20 mg PEG/G-TUBE BID@0000,1200 01/30/16 03/15/20 History


 


Lacosamide [Vimpat] 200 mg PEG/G-TUBE 01/30/16 03/15/20 History





 TID@0000,0930,1700   


 


Ascorbic Acid [Vitamin C] 500 mg PEG/G-TUBE BID@0930,1700 06/03/19 03/15/20 

History


 


Folic Acid 1 mg PEG/G-TUBE DAILY@0930 06/03/19 03/15/20 History


 


Polyethylene Glycol 3350 [Miralax] 17 gm PEG/G-TUBE DAILY@0930 06/03/19 03/15/20

 History


 


Vitamin B Complex 1 cap PEG/G-TUBE DAILY@0930 06/03/19 03/15/20 History


 


clonazePAM [KlonoPIN] 1 mg PEG/G-TUBE TID@0000,0930,1700 06/03/19 03/15/20 

History


 


levETIRAcetam [Keppra Oral 1,800 mg PEG/G-TUBE 06/03/19 03/15/20 History





Solution] TID@0000,0930,1700   


 


Albuterol Nebulized [Ventolin 5 mg INHALATION Q12H 02/24/20 03/15/20 History





Nebulized]    


 


Cbd Oil 100 mg PEG/G-TUBE BID@0930,1200 02/24/20 03/15/20 History


 


Dexlansoprazole [Dexilant] 60 mg PEG/G-TUBE DAILY@0930 02/24/20 03/15/20 History


 


L.acidoph,Paracasei, B.lactis 1 cap PEG/G-TUBE Q72H 02/24/20 03/15/20 History





[Probiotic]    


 


Scopolamine [Scopolamine 1 MG/72 1 patch TRANSDERM Q72H 02/24/20 03/15/20 

History





HR patch]    


 


Tamsulosin [Flomax] 0.4 mg PEG/G-TUBE DAILY@0930 02/24/20 03/15/20 History


 


Magnesium Citrate 5 oz PO ONCE PRN 03/15/20 03/15/20 History


 


Magnesium Hydroxide [Milk of 4,800 mg PO HS PRN 03/15/20 03/15/20 History





Magnesia]    


 


Na Phos,M-B/Na Phos,Di-Ba [Fleet 133 ml RECTAL ONCE PRN 03/15/20 03/15/20 

History





Adult]    


 


Rufinamide [Banzel] 1,600 mg PO BID@0000,1200 03/15/20 03/15/20 History








                                    Allergies











Allergy/AdvReac Type Severity Reaction Status Date / Time


 


amoxicillin trihydrate Allergy  Unknown Verified 03/15/20 11:56





[From Augmentin]     


 


ceftriaxone sodium Allergy  Unknown Verified 03/15/20 11:56





[From Rocephin]     


 


cephalexin monohydrate Allergy  Unknown Verified 03/15/20 11:56





[From Keflex]     


 


Penicillins Allergy  Unknown Verified 03/15/20 11:56


 


phenobarbital Allergy  Unknown Verified 03/15/20 11:56


 


phenytoin sodium Allergy  Unknown Verified 03/15/20 11:56





[From Dilantin]     


 


phenytoin sodium extended Allergy  Unknown Verified 03/15/20 11:56





[From Dilantin]     


 


potassium clavulanate Allergy  Unknown Verified 03/15/20 11:56





[From Augmentin]     


 


Cephalosporins AdvReac  Unknown Verified 03/15/20 11:56


 


lorazepam [From Ativan] AdvReac  Unknown Verified 03/15/20 11:56














Surgical - Exam


                                   Vital Signs











Temp Pulse Resp BP Pulse Ox


 


 97.8 F   68   18   154/94   97 


 


 03/15/20 11:46  03/15/20 11:46  03/15/20 11:46  03/15/20 11:46  03/15/20 11:46














- General


well developed, well nourished





- ENT


no hearing loss





- Respiratory


normal respiratory effort





- Abdomen


Abdomen: non tender





- Genitourinary


normal penis with no external lesions, testicles non-tender, other ([place] cat

heter in place)





Results





- Labs





                                 20 06:40





                                 20 06:46


                  Abnormal Lab Results - Last 24 Hours (Table)











  20 Range/Units





  20:18 06:46 


 


Potassium  3.2 L  3.4 L  (3.5-5.1)  mmol/L


 


Chloride   109 H  ()  mmol/L


 


BUN   5 L  (9-20)  mg/dL


 


Creatinine   0.61 L  (0.66-1.25)  mg/dL








                      Microbiology - Last 24 Hours (Table)











 03/15/20 12:24 Blood Culture - Preliminary





 Blood    No Growth after 96 hours


 


 03/15/20 22:00 Urine Culture - Final





 Urine,Catheterized    Enterococcus faecalis VRE








                                 Diabetes panel











  20 Range/Units





  20:18 02:03 06:46 


 


Sodium    140  (137-145)  mmol/L


 


Potassium  3.2 L  3.6  3.4 L  (3.5-5.1)  mmol/L


 


Chloride    109 H  ()  mmol/L


 


Carbon Dioxide    22  (22-30)  mmol/L


 


BUN    5 L  (9-20)  mg/dL


 


Creatinine    0.61 L  (0.66-1.25)  mg/dL


 


Glucose    80  (74-99)  mg/dL


 


Calcium    8.7  (8.4-10.2)  mg/dL








                                  Calcium panel











  20 Range/Units





  06:46 


 


Calcium  8.7  (8.4-10.2)  mg/dL








                                 Pituitary panel











  20 Range/Units





  20:18 02:03 06:46 


 


Sodium    140  (137-145)  mmol/L


 


Potassium  3.2 L  3.6  3.4 L  (3.5-5.1)  mmol/L


 


Chloride    109 H  ()  mmol/L


 


Carbon Dioxide    22  (22-30)  mmol/L


 


BUN    5 L  (9-20)  mg/dL


 


Creatinine    0.61 L  (0.66-1.25)  mg/dL


 


Glucose    80  (74-99)  mg/dL


 


Calcium    8.7  (8.4-10.2)  mg/dL








                                  Adrenal panel











  20 Range/Units





  20:18 02:03 06:46 


 


Sodium    140  (137-145)  mmol/L


 


Potassium  3.2 L  3.6  3.4 L  (3.5-5.1)  mmol/L


 


Chloride    109 H  ()  mmol/L


 


Carbon Dioxide    22  (22-30)  mmol/L


 


BUN    5 L  (9-20)  mg/dL


 


Creatinine    0.61 L  (0.66-1.25)  mg/dL


 


Glucose    80  (74-99)  mg/dL


 


Calcium    8.7  (8.4-10.2)  mg/dL














Assessment and Plan


(1) Bilateral hydronephrosis


Narrative/Plan: 


The patient's bilateral hydronephrosis is chronic and appears to be related to 

his infrequent frequent bladder emptying.  This in turn is related to his 

chronic constipation.  The patient underwent transurethral incision of his 

bladder neck and prostate 3 weeks ago and had been voiding more frequently since

then according to his aunt.  Once the patient's fecal impaction has been treated

he can have a voiding trial.  Until that time his catheter should be left in 

place.  Once the catheter has been removed he should be monitored with the 

bladder scan unit to assess his postvoid residuals. His urinary tract Infection 

is resistant to Levaquin and it would be reasonable to discontinue it and use an

alternative antibiotic.


Current Visit: Yes   Status: Acute   Code(s): N13.30 - UNSPECIFIED 

HYDRONEPHROSIS   SNOMED Code(s): 89618159 Procedure:  ROBOT ASSISTED TOTAL LAPAROSCOPIC HYSTERECTOMY, BILATERAL SALPINGO-OOPHORECTOMY, POSSIBLE SENTINEL LYMPH NODE MAPPING AND BIOPSY (N/A Abdomen)    Relevant Problems   GYN   (+) Endometrial cancer ([address] )      NEURO/PSYCH   (+) H/O uterine prolapse      Reaction to cephalexin was mild hives    EKG 5/21/2021: SR 75 IRBBB    CT Chest/abdomen/pelvis 5/27/2021:      Several pulmonary nodules, 5 mm and smaller with unknown long-term stability  Based on current Fleischner Society 2017 Guidelines on incidental pulmonary nodule, patients with a known malignancy are at increased risk of metastasis and should receive   initial three month follow-up chest CT      Multi lobar clustered micronodular opacities may be postinflammatory or postinfectious      CT abdomen and pelvis:     Evaluation slightly limited by lack of vascular and enteric contrast  Mildly enlarged 1 cm short axis pericaval and prominent subcentimeter short axis mesenteric and left para-aortic lymph nodes are present      Otherwise, no evidence of abdominopelvic metastatic disease insofar as can be detected on a noncontrast CT  Lab Results   Component Value Date    WBC 5 76 05/21/2021    HGB 13 9 05/21/2021    HCT 43 6 05/21/2021    MCV 87 05/21/2021     05/21/2021     Lab Results   Component Value Date    CALCIUM 8 9 05/21/2021    K 3 7 05/21/2021    CO2 31 05/21/2021     05/21/2021    BUN 12 05/21/2021    CREATININE 0 67 05/21/2021     Type and Screen:  A        Physical Exam    Airway    Mallampati score: II  TM Distance: >3 FB       Dental   Comment: Caps top left, bottom left, and bottom right, implant in top right,     Cardiovascular  Rhythm: regular, Rate: normal,     Pulmonary  Comment: Speaking in full sentences, normal work of breathing, Pulmonary exam normal     Other Findings        Anesthesia Plan  ASA Score- 3     Anesthesia Type- general with ASA Monitors           Additional Monitors:   Airway Plan:           Plan Factors-Exercise tolerance (METS): >4 METS  Chart reviewed  EKG reviewed  Imaging results reviewed  Existing labs reviewed  Patient summary reviewed  Patient is not a current smoker  Induction- intravenous  Postoperative Plan- Plan for postoperative opioid use  Informed Consent- Anesthetic plan and risks discussed with patient

## 2021-06-09 ENCOUNTER — HOSPITAL ENCOUNTER (EMERGENCY)
Dept: HOSPITAL 47 - EC | Age: 31
Discharge: HOME | End: 2021-06-09
Payer: MEDICARE

## 2021-06-09 VITALS — RESPIRATION RATE: 16 BRPM | HEART RATE: 80 BPM | SYSTOLIC BLOOD PRESSURE: 94 MMHG | DIASTOLIC BLOOD PRESSURE: 50 MMHG

## 2021-06-09 VITALS — TEMPERATURE: 97.8 F

## 2021-06-09 DIAGNOSIS — G40.909: ICD-10-CM

## 2021-06-09 DIAGNOSIS — J45.909: ICD-10-CM

## 2021-06-09 DIAGNOSIS — J39.8: Primary | ICD-10-CM

## 2021-06-09 PROCEDURE — 99282 EMERGENCY DEPT VISIT SF MDM: CPT

## 2021-06-09 NOTE — ED
Recheck HPI





- General


Chief Complaint: Recheck/Abnormal Lab/Rx


Stated Complaint: Trach Issues


Time Seen by Provider: 21 00:38


Source: family, RN notes reviewed, old records reviewed


Mode of arrival: ambulatory


Limitations: language barrier, altered mental status, physical limitation





- History of Present Illness


Initial Comments: 





This is a 31-year-old male DF for evaluation patient Dese for evaluation regards

to placement of trach.  Patient himself is unable to give history patient 

presents with caregiver who provides history


-: hour(s)


Returns Today for: other (Trach displaced)


Symptoms Since Prior Visit: no new symptoms


Context: planned re-check


Associated Symptoms: none





- Related Data


                                Home Medications











 Medication  Instructions  Recorded  Confirmed


 


Clobazam [Onfi] 20 mg PEG/G-TUBE BID@1200,0000 16


 


Lacosamide [Vimpat] 200 mg PEG/G-TUBE 16





 TID@1000,1700,0000  


 


Ascorbic Acid [Vitamin C] 500 mg PEG/G-TUBE BID@1000,1700 19


 


Folic Acid 1 mg PEG/G-TUBE DAILY@1000 19


 


clonazePAM [KlonoPIN] 1 mg PEG/G-TUBE TID@1000,1700,0000 19


 


levETIRAcetam [Keppra Oral 18 ml PEG/G-TUBE TID@1000,1700,0000 19





Solution]   


 


polyethylene glycoL 3350 [Miralax] 17 gm PEG/G-TUBE TID@1000,1700,0000 19


 


Albuterol Nebulized [Ventolin 2.5 mg INHALATION RT-TID PRN 20





Nebulized]   


 


Dexlansoprazole [Dexilant] 60 mg PEG/G-TUBE DAILY@1200 20


 


Tamsulosin [Flomax] 0.4 mg PEG/G-TUBE DAILY@1200 20


 


Cbd Capsule 750 mg PEG/G-TUBE DAILY@1200 20


 


Lactulose 30 gm PO TID@1000,1700,0000 20


 


Banzel 400mg 1,200 mg PO DAILY@1200 21


 


Banzel 400mg 1,600 mg PO HS@0000 21


 


Cannabidiol (Cbd) [Epidiolex] 25 mg PO AS DIRECTED 21


 


Pyridoxine HCl (Vitamin B6) 100 mg PO HS@0000 21





[Vitamin B-6]   








                                  Previous Rx's











 Medication  Instructions  Recorded


 


Scopolamine [Scopolamine 1 MG/72 1 patch TRANSDERM Q72H #10 patch 20





HR patch]  











                                    Allergies











Allergy/AdvReac Type Severity Reaction Status Date / Time


 


amoxicillin trihydrate Allergy  Unknown Verified 21 00:26





[From Augmentin]     


 


ceftriaxone sodium Allergy  Unknown Verified 21 00:26





[From Rocephin]     


 


cephalexin monohydrate Allergy  Unknown Verified 21 00:26





[From Keflex]     


 


Penicillins Allergy  Unknown Verified 21 00:26


 


phenobarbital Allergy  Unknown Verified 21 00:26


 


phenytoin sodium Allergy  Unknown Verified 21 00:26





[From Dilantin]     


 


phenytoin sodium extended Allergy  Unknown Verified 21 00:26





[From Dilantin]     


 


potassium clavulanate Allergy  Unknown Verified 21 00:26





[From Augmentin]     


 


Cephalosporins AdvReac  Unknown Verified 21 00:26


 


lorazepam [From Ativan] AdvReac  Unknown Verified 21 00:26


 


surgical tape AdvReac  hyperpigmentation Uncoded 21 00:26





   of skin  














Review of Systems


ROS Statement: 


Those systems with pertinent positive or pertinent negative responses have been 

documented in the HPI.





ROS Other: All systems not noted in ROS Statement are negative.





Past Medical History


Past Medical History: Asthma, Renal Disease, Seizure Disorder


Additional Past Medical History / Comment(s): Developmental delay, Autism, daily

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 Hx 

ulcers. Tenses up, clenches fists, shakes, a lot, screeches when excited, has a 

strong upper body. Arms  are permanently bent, he walks on toes. "Flight Risk."


History of Any Multi-Drug Resistant Organisms: VRE


Date of last positivie culture/infection: 3/15/20


MDRO Source:: VRE URINE


Additional Past Surgical History / Comment(s): Vagal nerve stimulator, left UPJ 

endopylotomy, embolist surgery (renal artery), heel cord lengthening, 

transurethral bladder neck and prostate incision 2020. Traceostomy. PEG 

Tube palced. Exploratory Laprotomy.


Past Anesthesia/Blood Transfusion Reactions: No Reported Reaction


Past Psychological History: ADD/ADHD


Smoking Status: Never smoker


Past Alcohol Use History: None Reported


Past Drug Use History: None Reported





- Past Family History


  ** Mother


Additional Family Medical History / Comment(s):  of Suicide at 22.





General Exam





- General Exam Comments


Initial Comments: 





Trach became dislodged


Limitations: language barrier, altered mental status, physical limitation


General appearance: alert, in no apparent distress


Head exam: Present: atraumatic, normocephalic, normal inspection


Eye exam: Present: normal appearance, PERRL, EOMI.  Absent: scleral icterus, 

conjunctival injection, periorbital swelling


ENT exam: Present: normal exam, mucous membranes moist


Neck exam: Present: normal inspection.  Absent: tenderness, meningismus, 

lymphadenopathy


Respiratory exam: Present: normal lung sounds bilaterally.  Absent: respiratory 

distress, wheezes, rales, rhonchi, stridor


Cardiovascular Exam: Present: regular rate, normal rhythm, normal heart sounds. 

Absent: systolic murmur, diastolic murmur, rubs, gallop, clicks


GI/Abdominal exam: Present: soft, normal bowel sounds.  Absent: distended, 

tenderness, guarding, rebound, rigid


Extremities exam: Present: normal inspection, full ROM, normal capillary refill.

 Absent: tenderness, pedal edema, joint swelling, calf tenderness


Back exam: Present: normal inspection


Neurological exam: Present: alert, oriented X3, CN II-XII intact


Psychiatric exam: Present: normal affect, normal mood


Skin exam: Present: warm, dry, intact, normal color.  Absent: rash





Course


                                   Vital Signs











  21





  00:27 01:44


 


Temperature 97.8 F 


 


Pulse Rate 106 H 80


 


Respiratory 24 16





Rate  


 


Blood Pressure 90/43 94/50


 


O2 Sat by Pulse 94 L 99





Oximetry  














- Reevaluation(s)


Reevaluation #1: 





Medical record is reviewed





Patient symptoms significantly improved here in the ER





Patient informed results questions answered





Trachea is replaced here in the ER





Medical Decision Making





- Medical Decision Making





31 male DF for evaluation patient has trach place replaced here in the ER p

atient can be discharged home no distress





Disposition


Clinical Impression: 


 Trachea displaced





Disposition: HOME SELF-CARE


Condition: Good


Instructions (If sedation given, give patient instructions):  Tracheostomy Care 

(ED)


Is patient prescribed a controlled substance at d/c from ED?: No


Referrals: 


Micaela Bean MD [Primary Care Provider] - 1-2 days

## 2021-07-27 ENCOUNTER — HOSPITAL ENCOUNTER (EMERGENCY)
Dept: HOSPITAL 47 - EC | Age: 31
LOS: 1 days | Discharge: HOME | End: 2021-07-28
Payer: MEDICARE

## 2021-07-27 DIAGNOSIS — J95.03: Primary | ICD-10-CM

## 2021-07-27 PROCEDURE — 99282 EMERGENCY DEPT VISIT SF MDM: CPT

## 2021-08-19 NOTE — CDI
Documentation Clarification Form





Date: 8/19/21

From: Diana Cespedes

Phone: 

MRN: [id number]

Admit Date: 07/27/2021 11:20:00 PM

Patient Name: Raj Silva

Visit Number: [id number]

Discharge Date: 7/27/21

Payor: MEDICARE HMO









Dr. Filipe Evans





Dear Dr. Wilkerson ED clinical impression since you did not write it on the scanned written 
downtime form.

Please add below the line .  This document is considered a part of the legal 
medical record.

Thank you,

Diana Cespedes

Manager, Coding

___________________________________________________



Tracheostomy Tube Dislodged.

Tracheostomy Tube Replaced

MTDD

## 2021-10-21 ENCOUNTER — HOSPITAL ENCOUNTER (OUTPATIENT)
Dept: HOSPITAL 47 - LABWHC1 | Age: 31
Discharge: HOME | End: 2021-10-21
Attending: PSYCHIATRY & NEUROLOGY
Payer: MEDICARE

## 2021-10-21 DIAGNOSIS — G40.814: Primary | ICD-10-CM

## 2021-10-21 LAB
ALT SERPL-CCNC: 12 U/L (ref 10–49)
ANION GAP SERPL CALC-SCNC: 17.6 MMOL/L (ref 4–12)
AST SERPL-CCNC: 15 U/L (ref 14–35)
BASOPHILS # BLD AUTO: 0.01 X 10*3/UL (ref 0–0.1)
BASOPHILS NFR BLD AUTO: 0.2 %
CHLORIDE SERPL-SCNC: 105 MMOL/L (ref 96–109)
CO2 SERPL-SCNC: 19.3 MMOL/L (ref 21.6–31.8)
EOSINOPHIL # BLD AUTO: 0.04 X 10*3/UL (ref 0.04–0.35)
EOSINOPHIL NFR BLD AUTO: 0.7 %
ERYTHROCYTE [DISTWIDTH] IN BLOOD BY AUTOMATED COUNT: 2.82 X 10*6/UL (ref 4.4–5.6)
ERYTHROCYTE [DISTWIDTH] IN BLOOD: 15.6 % (ref 11.5–14.5)
HCT VFR BLD AUTO: 29.8 % (ref 39.6–50)
HGB BLD-MCNC: 9.6 G/DL (ref 13–17)
LYMPHOCYTES # SPEC AUTO: 1.08 X 10*3/UL (ref 0.9–5)
LYMPHOCYTES NFR SPEC AUTO: 18.8 %
MCH RBC QN AUTO: 34 PG (ref 27–32)
MCHC RBC AUTO-ENTMCNC: 32.2 G/DL (ref 32–37)
MCV RBC AUTO: 105.7 FL (ref 80–97)
MONOCYTES # BLD AUTO: 0.47 X 10*3/UL (ref 0.2–1)
MONOCYTES NFR BLD AUTO: 8.2 %
NEUTROPHILS # BLD AUTO: 4.14 X 10*3/UL (ref 1.8–7.7)
NEUTROPHILS NFR BLD AUTO: 71.8 %
PLATELET # BLD AUTO: 305 X 10*3/UL (ref 140–440)
POTASSIUM SERPL-SCNC: 3.5 MMOL/L (ref 3.5–5.5)
SODIUM SERPL-SCNC: 142 MMOL/L (ref 135–145)
WBC # BLD AUTO: 5.76 X 10*3/UL (ref 4.5–10)

## 2021-10-21 PROCEDURE — 80051 ELECTROLYTE PANEL: CPT

## 2021-10-21 PROCEDURE — 84450 TRANSFERASE (AST) (SGOT): CPT

## 2021-10-21 PROCEDURE — 80235 DRUG ASSAY LACOSAMIDE: CPT

## 2021-10-21 PROCEDURE — 85025 COMPLETE CBC W/AUTO DIFF WBC: CPT

## 2021-10-21 PROCEDURE — 80346 BENZODIAZEPINES1-12: CPT

## 2021-10-21 PROCEDURE — 84460 ALANINE AMINO (ALT) (SGPT): CPT

## 2021-10-21 PROCEDURE — 36415 COLL VENOUS BLD VENIPUNCTURE: CPT

## 2021-10-21 PROCEDURE — 80177 DRUG SCRN QUAN LEVETIRACETAM: CPT

## 2021-11-17 ENCOUNTER — HOSPITAL ENCOUNTER (OUTPATIENT)
Dept: HOSPITAL 47 - LABWHC1 | Age: 31
Discharge: HOME | End: 2021-11-17
Attending: INTERNAL MEDICINE
Payer: MEDICARE

## 2021-11-17 DIAGNOSIS — R63.4: ICD-10-CM

## 2021-11-17 DIAGNOSIS — D63.8: ICD-10-CM

## 2021-11-17 DIAGNOSIS — G40.89: Primary | ICD-10-CM

## 2021-11-17 LAB
BASOPHILS # BLD AUTO: 0.02 X 10*3/UL (ref 0–0.1)
BASOPHILS NFR BLD AUTO: 0.2 %
EOSINOPHIL # BLD AUTO: 0.08 X 10*3/UL (ref 0.04–0.35)
EOSINOPHIL NFR BLD AUTO: 1 %
ERYTHROCYTE [DISTWIDTH] IN BLOOD BY AUTOMATED COUNT: 2.14 X 10*6/UL (ref 4.4–5.6)
ERYTHROCYTE [DISTWIDTH] IN BLOOD: 16.2 % (ref 11.5–14.5)
FERRITIN SERPL-MCNC: 254 NG/ML (ref 22–322)
HCT VFR BLD AUTO: 23.9 % (ref 39.6–50)
HGB BLD-MCNC: 7.4 G/DL (ref 13–17)
IRON SERPL-MCNC: 38 UG/DL (ref 65–175)
LYMPHOCYTES # SPEC AUTO: 1.3 X 10*3/UL (ref 0.9–5)
LYMPHOCYTES NFR SPEC AUTO: 16 %
MCH RBC QN AUTO: 34.6 PG (ref 27–32)
MCHC RBC AUTO-ENTMCNC: 31 G/DL (ref 32–37)
MCV RBC AUTO: 111.7 FL (ref 80–97)
MONOCYTES # BLD AUTO: 0.56 X 10*3/UL (ref 0.2–1)
MONOCYTES NFR BLD AUTO: 6.9 %
NEUTROPHILS # BLD AUTO: 6.14 X 10*3/UL (ref 1.8–7.7)
NEUTROPHILS NFR BLD AUTO: 75.4 %
PLATELET # BLD AUTO: 354 X 10*3/UL (ref 140–440)
T4 FREE SERPL-MCNC: 1.06 NG/DL (ref 0.8–1.8)
TIBC SERPL-MCNC: 150 UG/DL (ref 228–460)
VIT B12 SERPL-MCNC: 653 PG/ML (ref 200–944)
WBC # BLD AUTO: 8.14 X 10*3/UL (ref 4.5–10)

## 2021-11-17 PROCEDURE — 82607 VITAMIN B-12: CPT

## 2021-11-17 PROCEDURE — 82306 VITAMIN D 25 HYDROXY: CPT

## 2021-11-17 PROCEDURE — 85025 COMPLETE CBC W/AUTO DIFF WBC: CPT

## 2021-11-17 PROCEDURE — 84443 ASSAY THYROID STIM HORMONE: CPT

## 2021-11-17 PROCEDURE — 36415 COLL VENOUS BLD VENIPUNCTURE: CPT

## 2021-11-17 PROCEDURE — 84207 ASSAY OF VITAMIN B-6: CPT

## 2021-11-17 PROCEDURE — 82728 ASSAY OF FERRITIN: CPT

## 2021-11-17 PROCEDURE — 84425 ASSAY OF VITAMIN B-1: CPT

## 2021-11-17 PROCEDURE — 84439 ASSAY OF FREE THYROXINE: CPT

## 2021-11-17 PROCEDURE — 83550 IRON BINDING TEST: CPT

## 2021-11-17 PROCEDURE — 83540 ASSAY OF IRON: CPT

## 2021-11-17 PROCEDURE — 82746 ASSAY OF FOLIC ACID SERUM: CPT

## 2021-12-02 ENCOUNTER — HOSPITAL ENCOUNTER (INPATIENT)
Dept: HOSPITAL 47 - EC | Age: 31
LOS: 3 days | Discharge: OTHER INSTITUTION | DRG: 101 | End: 2021-12-05
Attending: HOSPITALIST | Admitting: HOSPITALIST
Payer: MEDICARE

## 2021-12-02 VITALS — BODY MASS INDEX: 17.3 KG/M2

## 2021-12-02 DIAGNOSIS — Z93.1: ICD-10-CM

## 2021-12-02 DIAGNOSIS — D64.9: ICD-10-CM

## 2021-12-02 DIAGNOSIS — Z51.5: ICD-10-CM

## 2021-12-02 DIAGNOSIS — Z87.11: ICD-10-CM

## 2021-12-02 DIAGNOSIS — R33.9: ICD-10-CM

## 2021-12-02 DIAGNOSIS — Z20.822: ICD-10-CM

## 2021-12-02 DIAGNOSIS — Z96.82: ICD-10-CM

## 2021-12-02 DIAGNOSIS — J45.909: ICD-10-CM

## 2021-12-02 DIAGNOSIS — F90.2: ICD-10-CM

## 2021-12-02 DIAGNOSIS — Z93.0: ICD-10-CM

## 2021-12-02 DIAGNOSIS — R62.50: ICD-10-CM

## 2021-12-02 DIAGNOSIS — E87.1: ICD-10-CM

## 2021-12-02 DIAGNOSIS — I95.9: ICD-10-CM

## 2021-12-02 DIAGNOSIS — N28.9: ICD-10-CM

## 2021-12-02 DIAGNOSIS — Z16.24: ICD-10-CM

## 2021-12-02 DIAGNOSIS — M24.59: ICD-10-CM

## 2021-12-02 DIAGNOSIS — R11.10: ICD-10-CM

## 2021-12-02 DIAGNOSIS — R00.0: ICD-10-CM

## 2021-12-02 DIAGNOSIS — F90.9: ICD-10-CM

## 2021-12-02 DIAGNOSIS — K59.09: ICD-10-CM

## 2021-12-02 DIAGNOSIS — G40.411: Primary | ICD-10-CM

## 2021-12-02 DIAGNOSIS — F79: ICD-10-CM

## 2021-12-02 DIAGNOSIS — F84.0: ICD-10-CM

## 2021-12-02 DIAGNOSIS — Z79.899: ICD-10-CM

## 2021-12-02 LAB
ALBUMIN SERPL-MCNC: 3.3 G/DL (ref 3.5–5)
ALP SERPL-CCNC: 109 U/L (ref 38–126)
ALT SERPL-CCNC: 14 U/L (ref 4–49)
ANION GAP SERPL CALC-SCNC: 6 MMOL/L
APTT BLD: 21.8 SEC (ref 22–30)
AST SERPL-CCNC: 26 U/L (ref 17–59)
BASOPHILS # BLD AUTO: 0 K/UL (ref 0–0.2)
BASOPHILS NFR BLD AUTO: 0 %
BUN SERPL-SCNC: 19 MG/DL (ref 9–20)
CALCIUM SPEC-MCNC: 8.7 MG/DL (ref 8.4–10.2)
CHLORIDE SERPL-SCNC: 114 MMOL/L (ref 98–107)
CO2 SERPL-SCNC: 24 MMOL/L (ref 22–30)
EOSINOPHIL # BLD AUTO: 0.1 K/UL (ref 0–0.7)
EOSINOPHIL NFR BLD AUTO: 1 %
ERYTHROCYTE [DISTWIDTH] IN BLOOD BY AUTOMATED COUNT: 2.73 M/UL (ref 4.3–5.9)
ERYTHROCYTE [DISTWIDTH] IN BLOOD: 17.2 % (ref 11.5–15.5)
GLUCOSE SERPL-MCNC: 95 MG/DL (ref 74–99)
HCT VFR BLD AUTO: 28.8 % (ref 39–53)
HGB BLD-MCNC: 9.6 GM/DL (ref 13–17.5)
INR PPP: 0.8 (ref ?–1.2)
LYMPHOCYTES # SPEC AUTO: 1.2 K/UL (ref 1–4.8)
LYMPHOCYTES NFR SPEC AUTO: 17 %
MCH RBC QN AUTO: 35 PG (ref 25–35)
MCHC RBC AUTO-ENTMCNC: 33.3 G/DL (ref 31–37)
MCV RBC AUTO: 105.4 FL (ref 80–100)
MONOCYTES # BLD AUTO: 0.4 K/UL (ref 0–1)
MONOCYTES NFR BLD AUTO: 5 %
NEUTROPHILS # BLD AUTO: 5.4 K/UL (ref 1.3–7.7)
NEUTROPHILS NFR BLD AUTO: 76 %
PLATELET # BLD AUTO: 359 K/UL (ref 150–450)
POTASSIUM SERPL-SCNC: 3.8 MMOL/L (ref 3.5–5.1)
PROT SERPL-MCNC: 6.2 G/DL (ref 6.3–8.2)
PT BLD: 9.4 SEC (ref 9–12)
SODIUM SERPL-SCNC: 144 MMOL/L (ref 137–145)
WBC # BLD AUTO: 7.2 K/UL (ref 3.8–10.6)

## 2021-12-02 PROCEDURE — 86850 RBC ANTIBODY SCREEN: CPT

## 2021-12-02 PROCEDURE — 82607 VITAMIN B-12: CPT

## 2021-12-02 PROCEDURE — 85610 PROTHROMBIN TIME: CPT

## 2021-12-02 PROCEDURE — 82271 OCCULT BLOOD OTHER SOURCES: CPT

## 2021-12-02 PROCEDURE — 85027 COMPLETE CBC AUTOMATED: CPT

## 2021-12-02 PROCEDURE — 71045 X-RAY EXAM CHEST 1 VIEW: CPT

## 2021-12-02 PROCEDURE — 83735 ASSAY OF MAGNESIUM: CPT

## 2021-12-02 PROCEDURE — 82747 ASSAY OF FOLIC ACID RBC: CPT

## 2021-12-02 PROCEDURE — 86901 BLOOD TYPING SEROLOGIC RH(D): CPT

## 2021-12-02 PROCEDURE — 85730 THROMBOPLASTIN TIME PARTIAL: CPT

## 2021-12-02 PROCEDURE — 36415 COLL VENOUS BLD VENIPUNCTURE: CPT

## 2021-12-02 PROCEDURE — 80048 BASIC METABOLIC PNL TOTAL CA: CPT

## 2021-12-02 PROCEDURE — 83605 ASSAY OF LACTIC ACID: CPT

## 2021-12-02 PROCEDURE — 99285 EMERGENCY DEPT VISIT HI MDM: CPT

## 2021-12-02 PROCEDURE — 87635 SARS-COV-2 COVID-19 AMP PRB: CPT

## 2021-12-02 PROCEDURE — 85025 COMPLETE CBC W/AUTO DIFF WBC: CPT

## 2021-12-02 PROCEDURE — 82746 ASSAY OF FOLIC ACID SERUM: CPT

## 2021-12-02 PROCEDURE — 86900 BLOOD TYPING SEROLOGIC ABO: CPT

## 2021-12-02 PROCEDURE — 94760 N-INVAS EAR/PLS OXIMETRY 1: CPT

## 2021-12-02 PROCEDURE — 80053 COMPREHEN METABOLIC PANEL: CPT

## 2021-12-02 PROCEDURE — 87040 BLOOD CULTURE FOR BACTERIA: CPT

## 2021-12-03 LAB
ERYTHROCYTE [DISTWIDTH] IN BLOOD BY AUTOMATED COUNT: 2.68 M/UL (ref 4.3–5.9)
ERYTHROCYTE [DISTWIDTH] IN BLOOD: 17.9 % (ref 11.5–15.5)
HCT VFR BLD AUTO: 29 % (ref 39–53)
HGB BLD-MCNC: 9.6 GM/DL (ref 13–17.5)
MCH RBC QN AUTO: 35.7 PG (ref 25–35)
MCHC RBC AUTO-ENTMCNC: 33.1 G/DL (ref 31–37)
MCV RBC AUTO: 108 FL (ref 80–100)
PLATELET # BLD AUTO: 372 K/UL (ref 150–450)
VIT B12 SERPL-MCNC: 644 PG/ML (ref 200–944)
WBC # BLD AUTO: 8.3 K/UL (ref 3.8–10.6)

## 2021-12-03 RX ADMIN — LEVETIRACETAM SCH MG: 100 SOLUTION ORAL at 16:00

## 2021-12-03 RX ADMIN — ACETAMINOPHEN PRN MG: 650 SUPPOSITORY RECTAL at 16:36

## 2021-12-03 RX ADMIN — CEFAZOLIN SCH MLS/HR: 330 INJECTION, POWDER, FOR SOLUTION INTRAMUSCULAR; INTRAVENOUS at 07:52

## 2021-12-03 RX ADMIN — ASPIRIN SCH: 325 TABLET ORAL at 11:59

## 2021-12-03 RX ADMIN — PANTOPRAZOLE SODIUM SCH MG: 40 INJECTION, POWDER, FOR SOLUTION INTRAVENOUS at 07:53

## 2021-12-03 RX ADMIN — LACTULOSE SCH GM: 20 SOLUTION ORAL at 16:49

## 2021-12-03 RX ADMIN — LACOSAMIDE SCH MG: 50 TABLET, FILM COATED ORAL at 16:48

## 2021-12-03 RX ADMIN — CEFAZOLIN SCH MLS/HR: 330 INJECTION, POWDER, FOR SOLUTION INTRAMUSCULAR; INTRAVENOUS at 23:29

## 2021-12-03 NOTE — P.HPIM
History of Present Illness


31-year-old male with the Doppler and the delay, mental retardation chronic 

contractures and autism,aunt is caregiver patient has long-standing seizure 

history was brought in the by the caregiver because of dark is discoloration in 

the residuals of the PEG tube.  She is concerned about GI bleed.  Patient didn't

have any bowel movement here.  Patient hemoglobin is 9.7 patient had history of 

GI bleeds in the past because of which caregiver is concerned.  Patient doesn't 

have any clinical evidence of GI bleed at this time.  We'll repeat hemoglobin 

today again.  Patient has elevated MCV of 104 because of which I'll obtain B12 

and folate levels which need to be followed as an outpatient.  Patient is 

nonverbal and unable to obtain any kind of history from the patient.  Have 

chronic constipation as well.











REVIEW OF SYSTEMS: 


Unable to obtain due to his clinical condition








PHYSICAL EXAMINATION: 





GENERAL: Thin built with chronic contractures .


HEENT: Pupils are round and equally reacting to light. EOMI. No scleral icterus.

No conjunctival pallor. Normocephalic, atraumatic. No pharyngeal erythema. No 

thyromegaly. 


CARDIOVASCULAR: S1 and S2 present. No murmurs, rubs, or gallops. 


PULMONARY: Chest is clear to auscultation, no wheezing or crackles. 


ABDOMEN: Soft, nontender, nondistended, No palpable organomegaly.  PEG tube in 

place sluggish bowel sounds


MUSCULOSKELETAL: No joint swelling or deformity.


EXTREMITIES: No cyanosis, clubbing, or pedal edema. 


NEUROLOGICAL: Diffuse muscle atrophy and chronic contractures


SKIN: No rashes. 





Assessment and plan


-Ruled out GI bleed.  Patient clinically doesn't have any GI bleed at this time 

we flushed the PEG tube which is clear.  Patient will will be given his 

medications via PEG tube.  We'll recheck the hemoglobin today again patient will

be discharged later today


-Chronic anemia need to evaluate for B12 and folate deficiency labs of which 

were ordered, I do not anticipate these lab results back need to be followed as 

an outpatient.


-Intractable seizures for which patient is on multiple medications she'll be 

continued


-Autism


-Chronic contractures: Supportive care 





Patient will be discharged today








Past Medical History


Past Medical History: Asthma, Renal Disease, Seizure Disorder


Additional Past Medical History / Comment(s): Developmental delay, Autism, daily

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 Hx 

ulcers. Tenses up, clenches fists, shakes, a lot, screeches when excited, has a 

strong upper body. Arms  are permanently bent, he walks on toes. "Flight Risk."


History of Any Multi-Drug Resistant Organisms: VRE


Date of last positivie culture/infection: 3/15/20


MDRO Source:: VRE URINE


Additional Past Surgical History / Comment(s): Vagal nerve stimulator, left UPJ 

endopylotomy, embolist surgery (renal artery), heel cord lengthening, 

transurethral bladder neck and prostate incision 2020. Traceostomy. PEG 

Tube palced. Exploratory Laprotomy.


Past Anesthesia/Blood Transfusion Reactions: No Reported Reaction


Past Psychological History: ADD/ADHD


Smoking Status: Never smoker


Past Alcohol Use History: None Reported


Past Drug Use History: None Reported





- Past Family History


  ** Mother


Additional Family Medical History / Comment(s):  of Suicide at 22.





Medications and Allergies


                                Home Medications











 Medication  Instructions  Recorded  Confirmed  Type


 


Lacosamide [Vimpat] 200 mg PEG/G-TUBE 16 History





 TID@1000,1700,0000   


 


Folic Acid 1 mg PEG/G-TUBE DAILY@1000 19 History


 


clonazePAM [KlonoPIN] 1 mg PEG/G-TUBE TID@1000,1700,0000 19 

History


 


levETIRAcetam [Keppra Oral 14 ml PEG/G-TUBE TID@1000,1700,0000 19

 History





Solution]    


 


polyethylene glycoL 3350 [Miralax] 17 gm PEG/G-TUBE TID@1000,1700,0000 19 History


 


Dexlansoprazole [Dexilant] 60 mg PEG/G-TUBE DAILY@1000 20 History


 


Tamsulosin [Flomax] 0.4 mg PEG/G-TUBE DAILY@1000 20 History


 


Cbd Capsule 750 mg PEG/G-TUBE HS@0000 20 History


 


Lactulose 30 gm PO TID@1000,1700,0000 20 History


 


Banzel 400mg 1,200 mg PEG/G-TUBE DAILY@1200 21 History


 


Banzel 400mg 1,600 mg PEG/G-TUBE HS@0000 21 History


 


Pyridoxine HCl (Vitamin B6) 100 mg PO HS@0000 21 History





[Vitamin B-6]    


 


Diazepam [Valium] 5 mg PO DAILY PRN 21 History


 


Vitamin B-6 Complex 1 tab PEG/G-TUBE DAILY@1000 21 History


 


cloBAZam [Clobazam] 20 mg PO BID@1200,0000 21 History


 


Scopolamine [Scopolamine 1 MG/72 1 patch TRANSDERM Q72H #10 patch 21 Rx





HR patch]    








                                    Allergies











Allergy/AdvReac Type Severity Reaction Status Date / Time


 


amoxicillin trihydrate Allergy  Unknown Verified 21 22:09





[From Augmentin]     


 


ceftriaxone sodium Allergy  Unknown Verified 21 22:09





[From Rocephin]     


 


cephalexin monohydrate Allergy  Unknown Verified 21 22:09





[From Keflex]     


 


Penicillins Allergy  Unknown Verified 21 22:09


 


phenobarbital Allergy  Unknown Verified 21 22:09


 


phenytoin sodium Allergy  Unknown Verified 21 22:09





[From Dilantin]     


 


phenytoin sodium extended Allergy  Unknown Verified 21 22:09





[From Dilantin]     


 


potassium clavulanate Allergy  Unknown Verified 21 22:09





[From Augmentin]     


 


Cephalosporins AdvReac  Unknown Verified 21 22:09


 


lorazepam [From Ativan] AdvReac  Unknown Verified 21 22:09


 


surgical tape AdvReac  hyperpigmentation Uncoded 21 21:04





   of skin  














Physical Exam


Vitals: 


                                   Vital Signs











  Temp Pulse Resp BP Pulse Ox


 


 21 07:53  98.9 F  91  18  93/51  100


 


 21 06:06  97.8 F  95  20  95/49  97


 


 21 02:11   71  15  107/69  98


 


 21 21:08  98.4 F  77  15  92/62  100








                                Intake and Output











 21





 22:59 06:59 14:59


 


Other:   


 


  Weight 54.885 kg  














Results


CBC & Chem 7: 


                                 21 21:29





                                 21 21:29


Labs: 


                  Abnormal Lab Results - Last 24 Hours (Table)











  21 Range/Units





  21:29 21:29 21:29 


 


RBC  2.73 L    (4.30-5.90)  m/uL


 


Hgb  9.6 L    (13.0-17.5)  gm/dL


 


Hct  28.8 L    (39.0-53.0)  %


 


MCV  105.4 H    (80.0-100.0)  fL


 


RDW  17.2 H    (11.5-15.5)  %


 


APTT   21.8 L   (22.0-30.0)  sec


 


Chloride    114 H  ()  mmol/L


 


Total Protein    6.2 L  (6.3-8.2)  g/dL


 


Albumin    3.3 L  (3.5-5.0)  g/dL

## 2021-12-03 NOTE — XR
EXAMINATION TYPE: XR chest 1V portable

 

DATE OF EXAM: 12/3/2021

 

COMPARISON: 2/9/2021

 

HISTORY: Possible infection. Cough

 

TECHNIQUE: Single view

 

FINDINGS: There is tracheostomy tube. Heart is normal. There is left axillary pacemaker noted. There 
is no pleural effusion. There are no hilar masses.

 

IMPRESSION: No active cardiopulmonary disease there is improved inspiration compared to old exam.

## 2021-12-03 NOTE — ED
GI Bleed HPI





- General


Chief complaint: GI Bleed


Stated complaint: GI issues


Time Seen by Provider: 21 21:11


Source: family, EMS


Mode of arrival: EMS





- Related Data


                                Home Medications











 Medication  Instructions  Recorded  Confirmed


 


Lacosamide [Vimpat] 200 mg PEG/G-TUBE 16





 TID@1000,1700,0000  


 


Folic Acid 1 mg PEG/G-TUBE DAILY@1000 19


 


clonazePAM [KlonoPIN] 1 mg PEG/G-TUBE TID@1000,1700,0000 19


 


levETIRAcetam [Keppra Oral 14 ml PEG/G-TUBE TID@1000,1700,0000 19





Solution]   


 


polyethylene glycoL 3350 [Miralax] 17 gm PEG/G-TUBE TID@1000,1700,0000 19


 


Dexlansoprazole [Dexilant] 60 mg PEG/G-TUBE DAILY@1000 20


 


Tamsulosin [Flomax] 0.4 mg PEG/G-TUBE DAILY@1000 20


 


Cbd Capsule 750 mg PEG/G-TUBE HS@0000 20


 


Lactulose 30 gm PO TID@1000,1700,0000 20


 


Banzel 400mg 1,200 mg PEG/G-TUBE DAILY@1200 21


 


Banzel 400mg 1,600 mg PEG/G-TUBE HS@0000 21


 


Pyridoxine HCl (Vitamin B6) 100 mg PO HS@0000 21





[Vitamin B-6]   


 


Diazepam [Valium] 5 mg PO DAILY PRN 21


 


Vitamin B-6 Complex 1 tab PEG/G-TUBE DAILY@1000 21


 


cloBAZam [Clobazam] 20 mg PO BID@1200,0000 21








                                  Previous Rx's











 Medication  Instructions  Recorded


 


Scopolamine [Scopolamine 1 MG/72 1 patch TRANSDERM Q72H #10 patch 20





HR patch]  











                                    Allergies











Allergy/AdvReac Type Severity Reaction Status Date / Time


 


amoxicillin trihydrate Allergy  Unknown Verified 21 22:09





[From Augmentin]     


 


ceftriaxone sodium Allergy  Unknown Verified 21 22:09





[From Rocephin]     


 


cephalexin monohydrate Allergy  Unknown Verified 21 22:09





[From Keflex]     


 


Penicillins Allergy  Unknown Verified 21 22:09


 


phenobarbital Allergy  Unknown Verified 21 22:09


 


phenytoin sodium Allergy  Unknown Verified 21 22:09





[From Dilantin]     


 


phenytoin sodium extended Allergy  Unknown Verified 21 22:09





[From Dilantin]     


 


potassium clavulanate Allergy  Unknown Verified 21 22:09





[From Augmentin]     


 


Cephalosporins AdvReac  Unknown Verified 21 22:09


 


lorazepam [From Ativan] AdvReac  Unknown Verified 21 22:09


 


surgical tape AdvReac  hyperpigmentation Uncoded 21 21:04





   of skin  














Review of Systems


ROS Statement: 


Those systems with pertinent positive or pertinent negative responses have been 

documented in the HPI.





ROS Other: All systems not noted in ROS Statement are negative.





Past Medical History


Past Medical History: Asthma, Renal Disease, Seizure Disorder


Additional Past Medical History / Comment(s): Developmental delay, Autism, daily

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 Hx 

ulcers. Tenses up, clenches fists, shakes, a lot, screeches when excited, has a 

strong upper body. Arms  are permanently bent, he walks on toes. "Flight Risk."


History of Any Multi-Drug Resistant Organisms: VRE


Date of last positivie culture/infection: 3/15/20


MDRO Source:: VRE URINE


Additional Past Surgical History / Comment(s): Vagal nerve stimulator, left UPJ 

endopylotomy, embolist surgery (renal artery), heel cord lengthening, 

transurethral bladder neck and prostate incision 2020. Traceostomy. PEG 

Tube palced. Exploratory Laprotomy.


Past Anesthesia/Blood Transfusion Reactions: No Reported Reaction


Past Psychological History: ADD/ADHD


Smoking Status: Never smoker


Past Alcohol Use History: None Reported


Past Drug Use History: None Reported





- Past Family History


  ** Mother


Additional Family Medical History / Comment(s):  of Suicide at 22.





Course


                                   Vital Signs











  21





  21:08 02:11 06:06


 


Temperature 98.4 F  97.8 F


 


Pulse Rate 77 71 95


 


Respiratory 15 15 20





Rate   


 


Blood Pressure 92/62 107/69 95/49


 


O2 Sat by Pulse 100 98 97





Oximetry   














Medical Decision Making





- Lab Data


Result diagrams: 


                                 21 21:29





                                 21 21:29


                                   Lab Results











  21 Range/Units





  00:06 21:29 21:29 


 


WBC   7.2   (3.8-10.6)  k/uL


 


RBC   2.73 L   (4.30-5.90)  m/uL


 


Hgb   9.6 L   (13.0-17.5)  gm/dL


 


Hct   28.8 L   (39.0-53.0)  %


 


MCV   105.4 H   (80.0-100.0)  fL


 


MCH   35.0   (25.0-35.0)  pg


 


MCHC   33.3   (31.0-37.0)  g/dL


 


RDW   17.2 H   (11.5-15.5)  %


 


Plt Count   359   (150-450)  k/uL


 


MPV   7.3   


 


Neutrophils %   76   %


 


Lymphocytes %   17   %


 


Monocytes %   5   %


 


Eosinophils %   1   %


 


Basophils %   0   %


 


Neutrophils #   5.4   (1.3-7.7)  k/uL


 


Lymphocytes #   1.2   (1.0-4.8)  k/uL


 


Monocytes #   0.4   (0-1.0)  k/uL


 


Eosinophils #   0.1   (0-0.7)  k/uL


 


Basophils #   0.0   (0-0.2)  k/uL


 


Hypochromasia   Slight   


 


Poikilocytosis   Moderate   


 


Anisocytosis   Slight   


 


Macrocytosis   Moderate   


 


PT    9.4  (9.0-12.0)  sec


 


INR    0.8  (<1.2)  


 


APTT    21.8 L  (22.0-30.0)  sec


 


Sodium     (137-145)  mmol/L


 


Potassium     (3.5-5.1)  mmol/L


 


Chloride     ()  mmol/L


 


Carbon Dioxide     (22-30)  mmol/L


 


Anion Gap     mmol/L


 


BUN     (9-20)  mg/dL


 


Creatinine     (0.66-1.25)  mg/dL


 


Est GFR (CKD-EPI)AfAm     (>60 ml/min/1.73 sqM)  


 


Est GFR (CKD-EPI)NonAf     (>60 ml/min/1.73 sqM)  


 


Glucose     (74-99)  mg/dL


 


Calcium     (8.4-10.2)  mg/dL


 


Total Bilirubin     (0.2-1.3)  mg/dL


 


AST     (17-59)  U/L


 


ALT     (4-49)  U/L


 


Alkaline Phosphatase     ()  U/L


 


Total Protein     (6.3-8.2)  g/dL


 


Albumin     (3.5-5.0)  g/dL


 


Gastric Occult Blood  Positive    (Negative)  


 


Blood Type     


 


Blood Type Recheck     


 


Bld Type Recheck Status     


 


Antibody Screen     


 


Spec Expiration Date     














  21 Range/Units





  21:29 00:51 


 


WBC    (3.8-10.6)  k/uL


 


RBC    (4.30-5.90)  m/uL


 


Hgb    (13.0-17.5)  gm/dL


 


Hct    (39.0-53.0)  %


 


MCV    (80.0-100.0)  fL


 


MCH    (25.0-35.0)  pg


 


MCHC    (31.0-37.0)  g/dL


 


RDW    (11.5-15.5)  %


 


Plt Count    (150-450)  k/uL


 


MPV    


 


Neutrophils %    %


 


Lymphocytes %    %


 


Monocytes %    %


 


Eosinophils %    %


 


Basophils %    %


 


Neutrophils #    (1.3-7.7)  k/uL


 


Lymphocytes #    (1.0-4.8)  k/uL


 


Monocytes #    (0-1.0)  k/uL


 


Eosinophils #    (0-0.7)  k/uL


 


Basophils #    (0-0.2)  k/uL


 


Hypochromasia    


 


Poikilocytosis    


 


Anisocytosis    


 


Macrocytosis    


 


PT    (9.0-12.0)  sec


 


INR    (<1.2)  


 


APTT    (22.0-30.0)  sec


 


Sodium  144   (137-145)  mmol/L


 


Potassium  3.8   (3.5-5.1)  mmol/L


 


Chloride  114 H   ()  mmol/L


 


Carbon Dioxide  24   (22-30)  mmol/L


 


Anion Gap  6   mmol/L


 


BUN  19   (9-20)  mg/dL


 


Creatinine  1.05   (0.66-1.25)  mg/dL


 


Est GFR (CKD-EPI)AfAm  >90   (>60 ml/min/1.73 sqM)  


 


Est GFR (CKD-EPI)NonAf  >90   (>60 ml/min/1.73 sqM)  


 


Glucose  95   (74-99)  mg/dL


 


Calcium  8.7   (8.4-10.2)  mg/dL


 


Total Bilirubin  0.3   (0.2-1.3)  mg/dL


 


AST  26   (17-59)  U/L


 


ALT  14   (4-49)  U/L


 


Alkaline Phosphatase  109   ()  U/L


 


Total Protein  6.2 L   (6.3-8.2)  g/dL


 


Albumin  3.3 L   (3.5-5.0)  g/dL


 


Gastric Occult Blood    (Negative)  


 


Blood Type   O Positive  


 


Blood Type Recheck   O Pos  


 


Bld Type Recheck Status   No  


 


Antibody Screen   NEGATIVE  


 


Spec Expiration Date   2021 -   














Disposition


Clinical Impression: 


 GI bleeding, Anemia





Disposition: ADMITTED AS IP TO THIS [facility]


Condition: Fair


Instructions (If sedation given, give patient instructions):  Gastrointestinal 

Bleeding (ED)


Is patient prescribed a controlled substance at d/c from ED?: No


Referrals: 


Micaela Bean MD [Primary Care Provider] - 1-2 days

## 2021-12-04 LAB
ANION GAP SERPL CALC-SCNC: 11.6 MMOL/L (ref 10–18)
BASOPHILS # BLD AUTO: 0.02 X 10*3/UL (ref 0–0.1)
BASOPHILS NFR BLD AUTO: 0.3 %
BUN SERPL-SCNC: 12.9 MG/DL (ref 9–27)
BUN/CREAT SERPL: 12.29 RATIO (ref 12–20)
CALCIUM SPEC-MCNC: 8 MG/DL (ref 8.7–10.3)
CHLORIDE SERPL-SCNC: 121 MMOL/L (ref 96–109)
CO2 SERPL-SCNC: 17.2 MMOL/L (ref 20–27.5)
EOSINOPHIL # BLD AUTO: 0.02 X 10*3/UL (ref 0.04–0.35)
EOSINOPHIL NFR BLD AUTO: 0.3 %
ERYTHROCYTE [DISTWIDTH] IN BLOOD BY AUTOMATED COUNT: 2.12 X 10*6/UL (ref 4.4–5.6)
ERYTHROCYTE [DISTWIDTH] IN BLOOD: 17.5 % (ref 11.5–14.5)
GLUCOSE BLD-MCNC: 75 MG/DL (ref 75–99)
GLUCOSE SERPL-MCNC: 68 MG/DL (ref 70–110)
HCT VFR BLD AUTO: 24.6 % (ref 39.6–50)
HGB BLD-MCNC: 7.1 G/DL (ref 13–17)
INR PPP: 0.9 (ref ?–1.2)
LYMPHOCYTES # SPEC AUTO: 1.18 X 10*3/UL (ref 0.9–5)
LYMPHOCYTES NFR SPEC AUTO: 15.9 %
MACROCYTES BLD QL SMEAR: (no result)
MCH RBC QN AUTO: 33.5 PG (ref 27–32)
MCHC RBC AUTO-ENTMCNC: 28.9 G/DL (ref 32–37)
MCV RBC AUTO: 116 FL (ref 80–97)
MONOCYTES # BLD AUTO: 0.54 X 10*3/UL (ref 0.2–1)
MONOCYTES NFR BLD AUTO: 7.3 %
NEUTROPHILS # BLD AUTO: 5.61 X 10*3/UL (ref 1.8–7.7)
NEUTROPHILS NFR BLD AUTO: 75.8 %
PLATELET # BLD AUTO: 253 X 10*3/UL (ref 140–440)
POTASSIUM SERPL-SCNC: 3.8 MMOL/L (ref 3.5–5.5)
PT BLD: 10.1 SEC (ref 9–12)
SODIUM SERPL-SCNC: 150 MMOL/L (ref 135–145)
WBC # BLD AUTO: 7.4 X 10*3/UL (ref 4.5–10)

## 2021-12-04 RX ADMIN — CEFAZOLIN SCH: 330 INJECTION, POWDER, FOR SOLUTION INTRAMUSCULAR; INTRAVENOUS at 12:06

## 2021-12-04 RX ADMIN — LEVETIRACETAM SCH MG: 100 SOLUTION ORAL at 23:48

## 2021-12-04 RX ADMIN — FOLIC ACID SCH MG: 1 TABLET ORAL at 12:23

## 2021-12-04 RX ADMIN — LEVETIRACETAM SCH MG: 100 SOLUTION ORAL at 16:14

## 2021-12-04 RX ADMIN — PANTOPRAZOLE SODIUM SCH MG: 40 INJECTION, POWDER, FOR SOLUTION INTRAVENOUS at 08:37

## 2021-12-04 RX ADMIN — PYRIDOXINE HCL TAB 50 MG SCH MG: 50 TAB at 23:49

## 2021-12-04 RX ADMIN — ASPIRIN SCH: 325 TABLET ORAL at 23:47

## 2021-12-04 RX ADMIN — ASPIRIN SCH: 325 TABLET ORAL at 12:06

## 2021-12-04 RX ADMIN — LACTULOSE SCH GM: 20 SOLUTION ORAL at 02:50

## 2021-12-04 RX ADMIN — LEVETIRACETAM SCH MG: 100 SOLUTION ORAL at 12:35

## 2021-12-04 RX ADMIN — ASPIRIN SCH: 325 TABLET ORAL at 02:48

## 2021-12-04 RX ADMIN — LACTULOSE SCH GM: 20 SOLUTION ORAL at 12:23

## 2021-12-04 RX ADMIN — TAMSULOSIN HYDROCHLORIDE SCH MG: 0.4 CAPSULE ORAL at 12:23

## 2021-12-04 RX ADMIN — LACOSAMIDE SCH MG: 50 TABLET, FILM COATED ORAL at 12:29

## 2021-12-04 RX ADMIN — LACTULOSE SCH GM: 20 SOLUTION ORAL at 23:42

## 2021-12-04 RX ADMIN — LEVETIRACETAM SCH MG: 100 SOLUTION ORAL at 02:50

## 2021-12-04 RX ADMIN — PYRIDOXINE HCL TAB 50 MG SCH MG: 50 TAB at 02:49

## 2021-12-04 RX ADMIN — LACOSAMIDE SCH MG: 50 TABLET, FILM COATED ORAL at 16:14

## 2021-12-04 RX ADMIN — LACOSAMIDE SCH MG: 50 TABLET, FILM COATED ORAL at 02:50

## 2021-12-04 RX ADMIN — LACTULOSE SCH GM: 20 SOLUTION ORAL at 16:14

## 2021-12-04 NOTE — P.CNNES
History of Present Illness


Consult date: 21


Requesting physician: Juan J Wilson


Reason for Consult: seizure


History of Present Illness: 


This is a 31-year-old gentleman with medical history of autism, seizure, 

developmental delay, constipation, presented emergency department for dark red 

discoloration on the PEG tube.  History was obtained from medical record.  It 

seems that the patient was accompanied by his caregiver who is his hands and was

concerned about the GI bleed.  Neurology consulted for the patient history of 

seizures.  No seizures reported on presentation.  Patient is nonverbal at 

baseline per nurse and seems severely developmental delay.  Per nurse it seems 

family having difficulty taking care of patient.


Regarding the seizures the patient is on Keppra 14 mm by PEG tube he times a 

day.  Vimpat 200 mg once he times a day, clonazepam 20 mg 1 tablet twice a day, 

Clobazam 20mg 1 tab bid, CBD capsule 750mg daily, Banzel 1200mg daily and 1600mg

qhs, Valium 5 mg daily when necessary. Patient is also on folic acid 1 mg daily,

Vitamin B6 1 tab daily.





Per medical records patient has history of Lennox-Gastraut syndrome, with GTC 

seizure.  Patient has history of a PEG and a trach tube and he is nonverbal.





Some of the work-up consisted of:


Initial vital signs his blood pressure of 92/62, heart rate of 77, respiratory 

of 15, temperature of 98.4 Fahrenheit axillary and pulse ox of 100% room air.


Blood cell is 7.2 thousand, platelets of 359,000.  Immobile but is not 0.6 and 

hematocrit is 28.8.


AST 26, ALT 14, sodium is 144, creatinine is 1.05, serum glucose 95, plasma 

lactic acid vein is 21.1.


Gastric occult blood is positive.


Corona virus PCR was not detected.








Review of Systems


View of system is limited by the prone positive and negative as per HPI.








Past Medical History


Past Medical History: Asthma, Renal Disease, Seizure Disorder


Additional Past Medical History / Comment(s): Developmental delay, Autism, daily

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 Hx 

ulcers. Tenses up, clenches fists, shakes, a lot, screeches when excited, has a 

strong upper body. Arms  are permanently bent, he walks on toes. "Flight Risk."


History of Any Multi-Drug Resistant Organisms: VRE


Date of last positivie culture/infection: 3/15/20


MDRO Source:: VRE URINE


Additional Past Surgical History / Comment(s): Vagal nerve stimulator, left UPJ 

endopylotomy, embolist surgery (renal artery), heel cord lengthening, 

transurethral bladder neck and prostate incision 2020. Traceostomy. PEG 

Tube palced. Exploratory Laprotomy.


Past Anesthesia/Blood Transfusion Reactions: No Reported Reaction


Past Psychological History: ADD/ADHD


Smoking Status: Never smoker


Past Alcohol Use History: None Reported


Past Drug Use History: None Reported





- Past Family History


  ** Mother


Additional Family Medical History / Comment(s):  of Suicide at 22.





Medications and Allergies


                                Home Medications











 Medication  Instructions  Recorded  Confirmed  Type


 


Lacosamide [Vimpat] 200 mg PEG/G-TUBE 16 History





 TID@1000,1700,0000   


 


Folic Acid 1 mg PEG/G-TUBE DAILY@1000 19 History


 


clonazePAM [KlonoPIN] 1 mg PEG/G-TUBE TID@1000,1700,0000 19 

History


 


levETIRAcetam [Keppra Oral 14 ml PEG/G-TUBE TID@1000,1700,0000 19

 History





Solution]    


 


polyethylene glycoL 3350 [Miralax] 17 gm PEG/G-TUBE TID@1000,1700,0000 19 History


 


Dexlansoprazole [Dexilant] 60 mg PEG/G-TUBE DAILY@1000 20 History


 


Tamsulosin [Flomax] 0.4 mg PEG/G-TUBE DAILY@1000 20 History


 


Cbd Capsule 750 mg PEG/G-TUBE HS@0000 20 History


 


Lactulose 30 gm PO TID@1000,1700,0000 20 History


 


Banzel 400mg 1,200 mg PEG/G-TUBE DAILY@1200 21 History


 


Banzel 400mg 1,600 mg PEG/G-TUBE HS@0000 21 History


 


Pyridoxine HCl (Vitamin B6) 100 mg PO HS@0000 21 History





[Vitamin B-6]    


 


Diazepam [Valium] 5 mg PO DAILY PRN 21 History


 


Vitamin B-6 Complex 1 tab PEG/G-TUBE DAILY@1000 21 History


 


cloBAZam [Clobazam] 20 mg PO BID@1200,0000 21 History


 


Scopolamine [Scopolamine 1 MG/72 1 patch TRANSDERM Q72H #10 patch 21 Rx





HR patch]    








                                    Allergies











Allergy/AdvReac Type Severity Reaction Status Date / Time


 


amoxicillin trihydrate Allergy  Unknown Verified 21 22:09





[From Augmentin]     


 


ceftriaxone sodium Allergy  Unknown Verified 21 22:09





[From Rocephin]     


 


cephalexin monohydrate Allergy  Unknown Verified 21 22:09





[From Keflex]     


 


Penicillins Allergy  Unknown Verified 21 22:09


 


phenobarbital Allergy  Unknown Verified 21 22:09


 


phenytoin sodium Allergy  Unknown Verified 21 22:09





[From Dilantin]     


 


phenytoin sodium extended Allergy  Unknown Verified 21 22:09





[From Dilantin]     


 


potassium clavulanate Allergy  Unknown Verified 21 22:09





[From Augmentin]     


 


Cephalosporins AdvReac  Unknown Verified 21 22:09


 


lorazepam [From Ativan] AdvReac  Unknown Verified 21 22:09


 


surgical tape AdvReac  hyperpigmentation Uncoded 21 21:04





   of skin  














Physical Examination





- Vital Signs


Vital Signs: 


                                   Vital Signs











  Temp Pulse Pulse Resp BP BP Pulse Ox


 


 21 08:55   78    95/50  


 


 21 08:38  98.7 F  78   20  91/41   100


 


 21 08:02        97


 


 21 03:52        96


 


 21 03:00  98.6 F  86   22  120/70   96


 


 21 23:30  98.0 F  82   22  102/60   96


 


 21 21:30   85   20  102/57   98


 


 21 21:00  97.7 F  97   22  91/51  


 


 21 16:04  98.9 F   117 H  28 H   88/58 


 


 21 14:46  98.7 F  87   16  95/54   97











GENERAL: The patient is lying in bed and does not seem in acute distress.


CHEST: The heart rate is regular rate rhythm.   No murmurs to auscultation.  No 

carotid bruit bilaterally.


LUNG: Clear to auscultation bilaterally no wheezing noted throughout.  Not labor

ed breathing.  Has trach.


ABDOMEN/GI: Bowel sounds present in all 4 quadrants. No tenderness to palpation 

throughout..  Has PEG.





NEUROLOGICAL:


Higher mental function: The patient is awake.  But not is nonverbal and not 

following commands.  


Cranial nerves: The pupils are round, equal and reactive to light.   Extraocular

movement is tracking throughout the room and no appreciable nystagmus.  No fa

cial weakness.  Otherwise rest is limited.  


Motor: The strength is lifting bilateral upper extremities above gravity and 

bending his knees at beds.  Has decrease tone over wrist and hand bilaterally.  


Cerebellum: Could not assess.


Sensation: Could not assess.


Reflexes (right/left): 2+ throughout.


Plantars are mute bilaterally. 








Results





- Laboratory Findings


CBC and BMP: 


                                 21 11:30





                                 21 21:29


Abnormal Lab Findings: 


                                  Abnormal Labs











  21





  21:29 21:29 21:29


 


RBC  2.73 L  


 


Hgb  9.6 L  


 


Hct  28.8 L  


 


MCV  105.4 H  


 


MCH   


 


RDW  17.2 H  


 


Macrocytosis   


 


APTT   21.8 L 


 


Chloride    114 H


 


Plasma Lactic Acid Lobo   


 


Total Protein    6.2 L


 


Albumin    3.3 L














  21





  11:30 17:19


 


RBC  2.68 L 


 


Hgb  9.6 L 


 


Hct  29.0 L 


 


MCV  108.0 H 


 


MCH  35.7 H 


 


RDW  17.9 H 


 


Macrocytosis  Marked A 


 


APTT  


 


Chloride  


 


Plasma Lactic Acid Lobo   21.1 H*


 


Total Protein  


 


Albumin  














Assessment and Plan


Assessment: 





History of seizures and is on multiple antiepileptic drugs.


Chronic anemia.  Rule out acute GI bleed.


Autism


Developmental delay (seems severe)


History of peptic ulcer disease


PEG tube


Tracheostomy





Plan: 





From a neurological perspective the patient needs to follow-up with the his 

neurologist for further evaluation of the his history of seizures.  Continue his

home medication of his multiple antiepileptic drugs and will not modify and of 

his medication and we'll defer to his outpatient neurologist.  No further workup

needed from a neurological perspective as stated will defer as an outpatient.  


GI team is on board.


Defer the rest of the medical management to the primary team





The plan is discussed with the nurse.


Thank you for the consultation.





Chan Brewster MD


Neuro-Hospitalist








Time with Patient: Greater than 30

## 2021-12-04 NOTE — P.PN
Subjective





31-year-old male with the Doppler and the delay, mental retardation chronic 

contractures and autism,aunt is caregiver patient has long-standing seizure 

history was brought in the by the caregiver because of dark is discoloration in 

the residuals of the PEG tube.  She is concerned about GI bleed.  Patient didn't

have any bowel movement here.  Patient hemoglobin is 9.7 patient had history of 

GI bleeds in the past because of which caregiver is concerned.  Patient doesn't 

have any clinical evidence of GI bleed at this time.  We'll repeat hemoglobin 

today again.  Patient has elevated MCV of 104 because of which I'll obtain B12 

and folate levels which need to be followed as an outpatient.  Patient is 

nonverbal and unable to obtain any kind of history from the patient.  Have 

chronic constipation as well.





12/04/2021


Patient had a couple seizures yesterday which were brief antiseizure today and 

this is common for the patient.  Patient is on multiple medications for his 

seizures.  Patient is bit hyper and hyponatremic IV fluids will be discontinued 

patient may need to D5 water.  A she is caregiver cannot take of the patient 

anymore because of which patient related to be placed in a rehab





Review of systems: Unable to obtain due to his clinical condition





All inpatient medications were reviewed and appropriate changes in these 

medications as dictated in the interval history and assessment and plan.














PHYSICAL EXAMINATION: 





GENERAL: Thin built with chronic contractures .


HEENT: Pupils are round and equally reacting to light. EOMI. No scleral icterus.

No conjunctival pallor. Normocephalic, atraumatic. No pharyngeal erythema. No t

hyromegaly. 


CARDIOVASCULAR: S1 and S2 present. No murmurs, rubs, or gallops. 


PULMONARY: Chest is clear to auscultation, no wheezing or crackles. 


ABDOMEN: Soft, nontender, nondistended, No palpable organomegaly.  PEG tube in 

place sluggish bowel sounds


MUSCULOSKELETAL: No joint swelling or deformity.


EXTREMITIES: No cyanosis, clubbing, or pedal edema. 


NEUROLOGICAL: Diffuse muscle atrophy and chronic contractures


SKIN: No rashes. 





Assessment and plan


-Ruled out GI bleed.  Patient clinically doesn't have any GI bleed at this time 

we flushed the PEG tube which is clear.  Patient will will be given his 

medications via PEG tube.  We'll recheck the hemoglobin today again patient will

be discharged later today


-Chronic anemia need to evaluate for B12 and folate deficiency labs of which 

were ordered, I do not anticipate these lab results back need to be followed as 

an outpatient.


-Intractable seizures for which patient is on multiple medications she'll be 

continued


-Autism


-Chronic contractures: Supportive care 











Objective





- Vital Signs


Vital signs: 


                                   Vital Signs











Temp  98.6 F   12/04/21 14:00


 


Pulse  104 H  12/04/21 14:00


 


Resp  20   12/04/21 14:00


 


BP  94/57   12/04/21 14:00


 


Pulse Ox  96   12/04/21 14:00








                                 Intake & Output











 12/03/21 12/04/21 12/04/21





 18:59 06:59 18:59


 


Weight 54.885 kg  














- Labs


CBC & Chem 7: 


                                 12/04/21 07:58





                                 12/04/21 07:58


Labs: 


                  Abnormal Lab Results - Last 24 Hours (Table)











  12/03/21 12/04/21 12/04/21 Range/Units





  17:19 07:58 07:58 


 


RBC   2.12 L   (4.40-5.60)  X 10*6/uL


 


Hgb   7.1 L   (13.0-17.0)  g/dL


 


Hct   24.6 L   (39.6-50.0)  %


 


MCV   116.0 H   (80.0-97.0)  fL


 


MCH   33.5 H   (27.0-32.0)  pg


 


MCHC   28.9 L   (32.0-37.0)  g/dL


 


RDW   17.5 H   (11.5-14.5)  %


 


Eosinophils #   0.02 L   (0.04-0.35)  X 10*3/uL


 


Sodium    150 H  (135-145)  mmol/L


 


Chloride    121 H  ()  mmol/L


 


Carbon Dioxide    17.2 L  (20.0-27.5)  mmol/L


 


Glucose    68 L  ()  mg/dL


 


Plasma Lactic Acid Lobo  21.1 H*    (0.7-2.0)  mmol/L


 


Calcium    8.0 L  (8.7-10.3)  mg/dL

## 2021-12-05 ENCOUNTER — HOSPITAL ENCOUNTER (INPATIENT)
Dept: HOSPITAL 47 - 2SICU | Age: 31
LOS: 2 days | DRG: 951 | End: 2021-12-07
Attending: INTERNAL MEDICINE | Admitting: INTERNAL MEDICINE
Payer: MEDICAID

## 2021-12-05 VITALS — RESPIRATION RATE: 20 BRPM

## 2021-12-05 VITALS — BODY MASS INDEX: 17.3 KG/M2

## 2021-12-05 VITALS — HEART RATE: 114 BPM

## 2021-12-05 VITALS — DIASTOLIC BLOOD PRESSURE: 34 MMHG | TEMPERATURE: 103.4 F | SYSTOLIC BLOOD PRESSURE: 84 MMHG

## 2021-12-05 VITALS — HEART RATE: 130 BPM

## 2021-12-05 DIAGNOSIS — F84.0: ICD-10-CM

## 2021-12-05 DIAGNOSIS — R62.50: ICD-10-CM

## 2021-12-05 DIAGNOSIS — Z51.5: Primary | ICD-10-CM

## 2021-12-05 DIAGNOSIS — F90.9: ICD-10-CM

## 2021-12-05 DIAGNOSIS — Z93.0: ICD-10-CM

## 2021-12-05 DIAGNOSIS — Z79.899: ICD-10-CM

## 2021-12-05 DIAGNOSIS — M62.40: ICD-10-CM

## 2021-12-05 DIAGNOSIS — K59.09: ICD-10-CM

## 2021-12-05 DIAGNOSIS — Z93.1: ICD-10-CM

## 2021-12-05 DIAGNOSIS — G40.411: ICD-10-CM

## 2021-12-05 DIAGNOSIS — J45.909: ICD-10-CM

## 2021-12-05 LAB
ALBUMIN SERPL-MCNC: 2.9 G/DL (ref 3.5–5)
ALP SERPL-CCNC: 107 U/L (ref 38–126)
ALT SERPL-CCNC: 16 U/L (ref 4–49)
ANION GAP SERPL CALC-SCNC: 9 MMOL/L
AST SERPL-CCNC: 27 U/L (ref 17–59)
BUN SERPL-SCNC: 12 MG/DL (ref 9–20)
CALCIUM SPEC-MCNC: 8.8 MG/DL (ref 8.4–10.2)
CHLORIDE SERPL-SCNC: 118 MMOL/L (ref 98–107)
CO2 SERPL-SCNC: 18 MMOL/L (ref 22–30)
ERYTHROCYTE [DISTWIDTH] IN BLOOD BY AUTOMATED COUNT: 2.29 M/UL (ref 4.3–5.9)
ERYTHROCYTE [DISTWIDTH] IN BLOOD: 17.9 % (ref 11.5–15.5)
GLUCOSE BLD-MCNC: 71 MG/DL (ref 75–99)
GLUCOSE BLD-MCNC: 80 MG/DL (ref 75–99)
GLUCOSE BLD-MCNC: 84 MG/DL (ref 75–99)
GLUCOSE SERPL-MCNC: 72 MG/DL (ref 74–99)
HCT VFR BLD AUTO: 24.9 % (ref 39–53)
HGB BLD-MCNC: 8.4 GM/DL (ref 13–17.5)
MAGNESIUM SPEC-SCNC: 1.9 MG/DL (ref 1.6–2.3)
MCH RBC QN AUTO: 36.8 PG (ref 25–35)
MCHC RBC AUTO-ENTMCNC: 33.8 G/DL (ref 31–37)
MCV RBC AUTO: 108.8 FL (ref 80–100)
PLATELET # BLD AUTO: 338 K/UL (ref 150–450)
POTASSIUM SERPL-SCNC: 4.1 MMOL/L (ref 3.5–5.1)
PROT SERPL-MCNC: 5.6 G/DL (ref 6.3–8.2)
SODIUM SERPL-SCNC: 145 MMOL/L (ref 137–145)
WBC # BLD AUTO: 8.2 K/UL (ref 3.8–10.6)

## 2021-12-05 RX ADMIN — LACOSAMIDE SCH MG: 50 TABLET, FILM COATED ORAL at 10:38

## 2021-12-05 RX ADMIN — PANTOPRAZOLE SODIUM SCH MG: 40 INJECTION, POWDER, FOR SOLUTION INTRAVENOUS at 08:33

## 2021-12-05 RX ADMIN — LEVETIRACETAM SCH MG: 100 SOLUTION ORAL at 10:20

## 2021-12-05 RX ADMIN — LACTULOSE SCH: 20 SOLUTION ORAL at 08:39

## 2021-12-05 RX ADMIN — VALPROATE SODIUM SCH MLS/HR: 100 INJECTION, SOLUTION INTRAVENOUS at 22:17

## 2021-12-05 RX ADMIN — ASPIRIN SCH: 325 TABLET ORAL at 12:55

## 2021-12-05 RX ADMIN — LACOSAMIDE SCH: 50 TABLET, FILM COATED ORAL at 08:35

## 2021-12-05 RX ADMIN — LEVETIRACETAM SCH MG: 100 SOLUTION ORAL at 15:04

## 2021-12-05 RX ADMIN — ACETAMINOPHEN PRN MG: 650 SUPPOSITORY RECTAL at 15:32

## 2021-12-05 RX ADMIN — FOLIC ACID SCH MG: 1 TABLET ORAL at 08:38

## 2021-12-05 RX ADMIN — LACOSAMIDE SCH MG: 50 TABLET, FILM COATED ORAL at 15:31

## 2021-12-05 RX ADMIN — TAMSULOSIN HYDROCHLORIDE SCH: 0.4 CAPSULE ORAL at 08:38

## 2021-12-05 RX ADMIN — MORPHINE SULFATE SCH MLS/HR: 50 INJECTION, SOLUTION, CONCENTRATE INTRAVENOUS at 14:59

## 2021-12-05 RX ADMIN — ACETAMINOPHEN PRN MG: 650 SUPPOSITORY RECTAL at 03:10

## 2021-12-05 RX ADMIN — LACOSAMIDE SCH MG: 50 TABLET, FILM COATED ORAL at 21:47

## 2021-12-05 RX ADMIN — LEVETIRACETAM SCH: 100 SOLUTION ORAL at 23:16

## 2021-12-05 NOTE — P.HPIM
History of Present Illness


H&P Date: 21


Chief Complaint: Epilepsy; open for GIP


This is a 31-year-old male with autism and developmental delay, chronic 

contractures that was brought in by his aunt on 2021 for concern of dark 

discoloration and residuals in his PEG tube.  Denied nausea vomiting.  Patient 

with a past medical history significant for long-standing seizures on multiple 

medications, he has a PEG tube and tracheostomy as well.  Patient's aunt is also

his legal guardian.  There was not any clinical evidence of GI bleed at this 

time, his PEG tube was flushed and it was clear.  He does have chronic 

constipation.  Patient is nonverbal and was unable to obtain any History from 

the patient.  Labs on admission include white count of 7.2, hemoglobin 9.6, 

platelet count 359, INR is 0.8, sodium 144, potassium 3.8, chloride 114, BUN 19,

creatinine 1.05, sugars are in the 80s, lactic acid 0.9, AST 26, ALT 14, albumin

6.2, B12 644 and folate less than 20.  Gastric occult was positive and Covid PCR

was not detected.  He was evaluated by GI services who recommended Protonix 40 

mg daily and did not recommend endoscopic evaluation at this time.  Hemoglobin 

was stable this admission, dropped to 7.1 and then resolved up to 8.4.  Patient 

was hydrated. There was a rapid response conducted early morning on 2021, 

the patient was having ongoing seizures that was most likely status epilepticus,

as the patient was having 10 seizures about 3 minutes apart lasting 20-30 

seconds; more frequent.  Patient was started on Depakote, given IV push Ativan 

and because of the frequency of seizures was transferred to ICU.  Versed 

infusion was ordered as well as mechanical ventilation.  Patient's caregiver and

legal guardian refused infusion and intubation and they wished to proceed with 

comfort care and hospice measures.  Patient generally has about 5-7 seizures per

day at baseline.  Neurology has signed off the case as family would like to 

pursue comfort measures at this time. Patient was subsequently opened with 

hospice inpatient and transerred out of the ICU to the medical floor. Patient 

apparently had 4 seizures in a row upon arrival to the unit despite receiving 

Keppra, Vimpat, Klonopin, Valium, Ativan, and Banzel for seizure control. 

Patient was then started on ativan Gtt in addition to morphine Gtt. 


Vitals reviewed: Temp 102.6 axillary, heart rate 130, respirations 38, blood 

pressure 92/40, oxygen saturation 92% on 15L oxygen.





REVIEW OF SYSTEMS: 


Unable to obtain due to his clinical condition





PHYSICAL EXAMINATION: 





GENERAL: Thin built with chronic contractures .


HEENT: Pupils are round and equally reacting to light. EOMI. No scleral icterus.

No conjunctival pallor. Normocephalic, atraumatic. No pharyngeal erythema. No 

thyromegaly. 


CARDIOVASCULAR: S1 and S2 present. No murmurs, rubs, or gallops. 


PULMONARY: There are some coarse lung sounds scattered posteriorly 


ABDOMEN: Soft, nontender, nondistended, No palpable organomegaly.  PEG tube in 

place sluggish bowel sounds


MUSCULOSKELETAL: No joint swelling or deformity.


EXTREMITIES: No cyanosis, clubbing, or pedal edema. 


NEUROLOGICAL: Diffuse muscle atrophy and chronic contractures


SKIN: No rashes. 





Assessment and Plan


Assessment 


-Status Epilepticus


-Refractory seizures for which patient is on multiple medications


-Ruled out GI bleed


-Chronic anemia, B12 and Folate WNL


-Autism, with severe developmental delay; nonverbal 


-Chronic contractures: Supportive care 


-Peg Tube


-Tracheostomy





Plan


Continue with Morphine gtt and IVP morphine for breakthrough


Continue with Ativan gtt with IVP ativan for breakthrough seizures


Continue with vimpat, keppra, valium, klonopin


Continue with IVPB Depaoke


Continue with all other supportive care and comfort measures.





Titrate medications per protocol in response to tachycardia/tachypnea


Goal respiratory rate between 10-15 breaths per minute


Wean oxygen as tolerated 





Past Medical History


Past Medical History: Asthma, Renal Disease, Seizure Disorder


Additional Past Medical History / Comment(s): Developmental delay, Autism, daily

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 Hx 

ulcers. Tenses up, clenches fists, shakes, a lot, screeches when excited, has a 

strong upper body. Arms  are permanently bent, he walks on toes. "Flight Risk."


History of Any Multi-Drug Resistant Organisms: VRE


Date of last positivie culture/infection: 3/15/20


MDRO Source:: VRE URINE


Additional Past Surgical History / Comment(s): Vagal nerve stimulator, left UPJ 

endopylotomy, embolist surgery (renal artery), heel cord lengthening, 

transurethral bladder neck and prostate incision 2020. Traceostomy. PEG 

Tube palced. Exploratory Laprotomy.


Past Anesthesia/Blood Transfusion Reactions: No Reported Reaction


Past Psychological History: ADD/ADHD


Additional Psychological History / Comment(s): Pt resides with his Aunt, Mulu, 

who is also his legal guardian and care giver.  Mulu states she can not take 

patient back home, her mother recently  and she states she had a "falling 

out" with her son who was also a caregiver for patient.  She is requesting that 

patient be placed in [facility].


Smoking Status: Never smoker


Past Alcohol Use History: None Reported


Past Drug Use History: None Reported





- Past Family History


  ** Mother


Additional Family Medical History / Comment(s):  of Suicide at 22.





Medications and Allergies


                                Home Medications











 Medication  Instructions  Recorded  Confirmed  Type


 


Lacosamide [Vimpat] 200 mg PEG/G-TUBE 16 History





 TID@1000,1700,0000   


 


Folic Acid 1 mg PEG/G-TUBE DAILY@1000 19 History


 


clonazePAM [KlonoPIN] 1 mg PEG/G-TUBE TID@1000,1700,0000 19 

History


 


levETIRAcetam [Keppra Oral 14 ml PEG/G-TUBE TID@1000,1700,0000 19

 History





Solution]    


 


polyethylene glycoL 3350 [Miralax] 17 gm PEG/G-TUBE TID@1000,1700,0000 19 History


 


Dexlansoprazole [Dexilant] 60 mg PEG/G-TUBE DAILY@1000 20 History


 


Tamsulosin [Flomax] 0.4 mg PEG/G-TUBE DAILY@1000 20 History


 


Cbd Capsule 750 mg PEG/G-TUBE HS@0000 20 History


 


Lactulose 30 gm PO TID@1000,1700,0000 20 History


 


Banzel 400mg 1,200 mg PEG/G-TUBE DAILY@1200 21 History


 


Banzel 400mg 1,600 mg PEG/G-TUBE HS@0000 21 History


 


Pyridoxine HCl (Vitamin B6) 100 mg PO HS@0000 21 History





[Vitamin B-6]    


 


Diazepam [Valium] 5 mg PO DAILY PRN 21 History


 


Vitamin B-6 Complex 1 tab PEG/G-TUBE DAILY@1000 21 History


 


cloBAZam [Clobazam] 20 mg PO BID@1200,0000 21 History


 


Scopolamine [Scopolamine 1 MG/72 1 patch TRANSDERM Q72H #10 patch 21 Rx





HR patch]    








                                    Allergies











Allergy/AdvReac Type Severity Reaction Status Date / Time


 


amoxicillin trihydrate Allergy  Unknown Verified 21 22:09





[From Augmentin]     


 


ceftriaxone sodium Allergy  Unknown Verified 21 22:09





[From Rocephin]     


 


cephalexin monohydrate Allergy  Unknown Verified 21 22:09





[From Keflex]     


 


Penicillins Allergy  Unknown Verified 21 22:09


 


phenobarbital Allergy  Unknown Verified 21 22:09


 


phenytoin sodium Allergy  Unknown Verified 21 22:09





[From Dilantin]     


 


phenytoin sodium extended Allergy  Unknown Verified 21 22:09





[From Dilantin]     


 


potassium clavulanate Allergy  Unknown Verified 21 22:09





[From Augmentin]     


 


Cephalosporins AdvReac  Unknown Verified 21 22:09


 


lorazepam [From Ativan] AdvReac  Unknown Verified 21 22:09


 


surgical tape AdvReac  hyperpigmentation Uncoded 21 21:04





   of skin  














Physical Exam


Vitals: 


                                   Vital Signs











  Temp Pulse Resp BP Pulse Ox


 


 21 18:02  99.4 F    


 


 21 15:20  102.6 F H  130 H  38 H  92/40  92 L


 


 21 15:19     92/40 








                                Intake and Output











 21





 14:59 22:59 06:59


 


Intake Total  135.989 


 


Output Total  480 


 


Balance  -344.011 


 


Intake:   


 


  IV  75 


 


    0.9% Sodium Chloride  75 


 


  Intake, IV Titration  1.989 





  Amount   


 


    Morphine Sulfate (100 mg/  1.989 





    2 ml) 100 mg In Sodium   





    Chloride 0.9% 100 ml @ 1   





    MG/HR 1.02 mls/hr IV .   





    Q24H Count includes the [facility] Rx#:[id number]   


 


  Tube Feeding  59 


 


Output:   


 


  Urine  480 


 


Other:   


 


  Voiding Method  External Catheter 


 


  Weight 54.8 kg  














Assessment and Plan


Time with Patient: Greater than 30

## 2021-12-05 NOTE — P.EN
A- team:





Indication: Seizure





Arrived on Scene to find: Patient having grand mal seizure.  Reviewed the chart 

and discussed the case with RN.  The patient is a 31-year-old male, with history

of severe developmental delay, nonverbal at baseline with a PMH of refractory 

seizures, on multiple antiepileptics, was admitted for GI bleeding.  The patient

began having grand mal seizures at roughly 1:30 AM today.  While at the scene, 

the patient had roughly 10 seizures, to 3 minutes apart, grand mal in nature, 

lasting for 20-30 seconds at a time. I discussed the case in great detail with 

neurologist on call Dr. Narcisa Giles who recommended loading doses of Depakote and

Vimpat along with Ativan IV push. Furthermore, it was recommended that patient 

be transferred to a tertiary care facility for continuous EEG monitoring due to 

concerns for status epilepticus. The case was also discussed with the patient's 

aunt (Mulu, legal guardian), who noted that he has a significant history of 

refractory seizures and he typically has 5-7 seizures per day at baseline. 





Patient seen and examined at bedside.





Vital signs reviewed


General: Chronically ill appearing male, intermittent grand-mal seizures, 

[appears at stated age]


Derm: [warm], [dry]


Head: [atraumatic], [normocephalic], [symmetric]


Eyes: [anicteric sclera]


Mouth: [no lip lesion], [mucus membranes moist]


Cardiovascular: [S1S2 reg], tachycardic, [no murmur], [positive posterior tibial

 pulse bilateral], 


Lungs: [CTA bilateral], [no rhonchi, no rales] , [no accessory muscle use]


Abdominal: [soft], [nontender to palpation], PEG tube in place


Ext: [no gross muscle atrophy], [no edema], [no contractures]


Neuro: Unable to assess, patient unresponsive to noxious stimuli, intermittent 

grand-mal seizures


Psych: Unable to assess 





Assessment: 





Breakthrough tonic-clonic seizures w/ concerns for status epilepticus


-Status post Depakote 1000 mg IV, Vimpat 100 mg IV


-Transferred to ICU





Notified: 


Family, Neurologist





A Total of 45 minutes of critical care time was spent on the complex care of 

this patient.

## 2021-12-05 NOTE — P.DS
Providers


Date of admission: 


12/04/21 16:58





Attending physician: 


Mandeep Prater





Consults: 





                                        





12/03/21 18:35


Consult Physician Routine 


   Consulting Provider: Angelita Alonzo


   Consult Reason/Comments: seizures


   Do you want consulting provider notified?: Yes





12/05/21 03:01


Consult Physician Routine 


   Consulting Provider: Cindi Chacon


   Consult Reason/Comments: ICU management, airway protection


   Do you want consulting provider notified?: Already Contacted











Primary care physician: 


Geneva Hinojosa





Hospital Course: 


Final Diagnosis


-Status Epilepticus


-Intractable seizures for which patient is on multiple medications


-Ruled out GI bleed.  Patient clinically doesn't have any GI bleed at this time 

we flushed the PEG tube which is clear


-Chronic anemia, B12 and Folate WNL


-Autism, with severe developmental delay; nonverbal 


-Chronic contractures: Supportive care 


-Peg Tube


-Tracheostomy





Discharge Disposition


Patient is discharged from inpatient medical services and will be opened to [facility] 

with hospice. 





Hospital Course


This is a 31-year-old male with autism and developmental delay, chronic 

contractures that was brought in by his aunt who is his caregiver is a dark 

discoloration and residuals in his PEG tube.  Denied nausea vomiting.  Patient 

with a past medical history significant for long-standing seizures on multiple 

medications, he has a PEG tube and tracheostomy as well.  Patient's aunt is also

his legal guardian.  There was not any clinical evidence of GI bleed at this 

time, his PEG tube was flushed and it was clear.  He does have chronic 

constipation.  Patient is nonverbal and was unable to obtain any History from 

the patient.  Labs on admission include white count of 7.2, hemoglobin 9.6, 

platelet count 359, INR is 0.8, sodium 144, potassium 3.8, chloride 114, BUN 19,

creatinine 1.05, sugars are in the 80s, lactic acid 0.9, AST 26, ALT 14, albumin

6.2, B12 644 and folate less than 20.  Gastric occult was positive and Covid PCR

was not detected.  He was evaluated by GI services who recommended Protonix 40 

mg daily and did not recommend endoscopic evaluation at this time.  Hemoglobin 

was stable this admission, dropped to 7.1 and then resolved up to 8.4.  Patient 

was hydrated.  Vital signs include a temp of 99.7 axillary, heart rate ranging 

from 114-56, blood pressures are in the low 80s over 30s up to the high 90s over

40s, he is oxygen saturation about 96% on 10L with an Fi02 of 98% via 

tracheostomy.  There was a rapid response conducted early morning on 12/05/2021,

the patient was having ongoing seizures that was most likely status epilepticus,

as the patient was having 10 seizures about 3 minutes apart lasting 20-30 

seconds; more frequent.  Patient was started on Depakote, given IV push Ativan 

and because of the frequency of seizures was transferred to ICU.  Versed infu

jake was ordered as well as mechanical ventilation.  Patient's caregiver and 

legal guardian refused infusion and intubation and they wished to proceed with 

comfort care and hospice measures.  Patient generally has about 5-7 seizures per

day at baseline.  Neurology has signed off the case as family would like to 

pursue comfort measures at this time.





12/05/2021


Patient evaluated today at bedside in the ICU. He has been downgraded to the 

medical floor as he will not be started on IV sedation infusion, and family had 

wished to pursue comfort measures.  It seemed that the seizures had started to s

low down however the nurse stated that around 10 AM the seizures had seemed to 

start to pick back up again.  Patient's  is also hypotensive at this time 82/34 

and because he will be made comfort care we are not going to aggressively treat 

this blood pressure.  Hemoglobin appears stable today 8.4, there are no signs of

acute bleeding. Lung sounds are coarse. Spoke with hospice, and we will begin to

transition patient to [facility]. Called patients Legal guardian to update and left a 

voicemail. 





Thank you for allowing us to participate in the care of this patient. 


Patient Condition at Discharge: Fair





Plan - Discharge Summary


Discharge Rx Participant: No


New Discharge Prescriptions: 


Continue


   Lacosamide [Vimpat] 200 mg PEG/G-TUBE TID@1000,1700,0000


   polyethylene glycoL 3350 [Miralax] 17 gm PEG/G-TUBE TID@1000,1700,0000


   Folic Acid 1 mg PEG/G-TUBE DAILY@1000


   levETIRAcetam [Keppra Oral Solution] 14 ml PEG/G-TUBE TID@1000,1700,0000


   clonazePAM [KlonoPIN] 1 mg PEG/G-TUBE TID@1000,1700,0000


   Tamsulosin [Flomax] 0.4 mg PEG/G-TUBE DAILY@1000


   Dexlansoprazole [Dexilant] 60 mg PEG/G-TUBE DAILY@1000


   Cbd Capsule 750 mg PEG/G-TUBE HS@0000


   Lactulose 30 gm PO TID@1000,1700,0000


   Banzel 400mg 1,200 mg PEG/G-TUBE DAILY@1200


   Pyridoxine HCl (Vitamin B6) [Vitamin B-6] 100 mg PO HS@0000


   Banzel 400mg 1,600 mg PEG/G-TUBE HS@0000


   cloBAZam [Clobazam] 20 mg PO BID@1200,0000


   Vitamin B-6 Complex 1 tab PEG/G-TUBE DAILY@1000


   Scopolamine [Scopolamine 1 MG/72 HR patch] 1 patch TRANSDERM Q72H #10 patch


   Diazepam [Valium] 5 mg PO DAILY PRN


     PRN Reason: AGGRESSION


Discharge Medication List





Lacosamide [Vimpat] 200 mg PEG/G-TUBE TID@1000,1700,0000 01/30/16 [History]


Folic Acid 1 mg PEG/G-TUBE DAILY@1000 06/03/19 [History]


clonazePAM [KlonoPIN] 1 mg PEG/G-TUBE TID@1000,1700,0000 06/03/19 [History]


levETIRAcetam [Keppra Oral Solution] 14 ml PEG/G-TUBE TID@1000,1700,0000 

06/03/19 [History]


polyethylene glycoL 3350 [Miralax] 17 gm PEG/G-TUBE TID@1000,1700,0000 06/03/19 

[History]


Dexlansoprazole [Dexilant] 60 mg PEG/G-TUBE DAILY@1000 02/24/20 [History]


Tamsulosin [Flomax] 0.4 mg PEG/G-TUBE DAILY@1000 02/24/20 [History]


Cbd Capsule 750 mg PEG/G-TUBE HS@0000 12/04/20 [History]


Lactulose 30 gm PO TID@1000,1700,0000 12/04/20 [History]


Banzel 400mg 1,200 mg PEG/G-TUBE DAILY@1200 02/09/21 [History]


Banzel 400mg 1,600 mg PEG/G-TUBE HS@0000 02/09/21 [History]


Pyridoxine HCl (Vitamin B6) [Vitamin B-6] 100 mg PO HS@0000 02/09/21 [History]


Diazepam [Valium] 5 mg PO DAILY PRN 12/02/21 [History]


Vitamin B-6 Complex 1 tab PEG/G-TUBE DAILY@1000 12/02/21 [History]


cloBAZam [Clobazam] 20 mg PO BID@1200,0000 12/02/21 [History]


Scopolamine [Scopolamine 1 MG/72 HR patch] 1 patch TRANSDERM Q72H #10 patch 

12/03/21 [Rx]








Follow up Appointment(s)/Referral(s): 


Micaela Bean MD [Primary Care Provider] - 3 Days


Patient Instructions/Handouts:  Gastrointestinal Bleeding (ED)


Activity/Diet/Wound Care/Special Instructions: 


Kylie

## 2021-12-05 NOTE — P.PN
Subjective


Progress Note Date: 12/05/21





I was notified by the patient's nurse today between 1:30 to 3am that the patient

was having recurrent seizures and was in status.  He was having grand mal 

seizure and was noted the was at 1:30 and it seems that the patient had 10 

seizures 3 minutes apart lasting 20-30 seconds.  Then was becoming more freq

uent.  Patient received the Ativan 8 mg and total as a result.  I notified them 

the to load the patient with Depakote at thousand milligram once and did start 

him on Depakote 500mg every 12 hours which was ordered.  Also since the patient 

was having frequent seizures and the patient was transferred to ICU and the 

recommended the sedation to control his status and consider transfer for long 

term EEG.  Dr. Scott (A-team) spoke with the family members and the patient has 

significant refractory seizures and he typically has 5-7 seizures per day at 

baseline.  Family member refused for the patient to be on IV sedation and the 

addition and they decided to move forward with comfort care measure.








Objective





- Vital Signs


Vital signs: 


                                   Vital Signs











Temp  99.7 F H  12/05/21 08:00


 


Pulse  114 H  12/05/21 08:00


 


Resp  16   12/05/21 08:00


 


BP  95/48   12/05/21 08:00


 


Pulse Ox  96   12/05/21 08:05








                                 Intake & Output











 12/04/21 12/05/21 12/05/21





 18:59 06:59 18:59


 


Intake Total  209 59


 


Output Total  0 0


 


Balance  209 59


 


Intake:   


 


  Intake, IV Titration  150 





  Amount   


 


    Lacosamide  mg In  100 





    Sodium Chloride 0.9% 50   





    ml @ 100 mls/hr IVPB ONCE   





    [facility] Rx#:[id number]   


 


    Valproate Sodium 1,000 mg  50 





    In Sodium Chloride 0.9%   





    50 ml @ 50 mls/hr IVPB   





    ONCE ONE Rx#:[id number]   


 


  Tube Feeding  59 59


 


Output:   


 


  Urine  0 0


 


Other:   


 


  Voiding Method  Diaper 





  Incontinent 


 


  # Voids 2  














- Exam





GENERAL: The patient is lying in bed and does not seem in acute distress.


LUNG:  Has trach.  Is not in respiratory distress.


ABDOMEN/GI: Has PEG.





NEUROLOGICAL:


Higher mental function: The patient is comatose.  Is no awake or following 

commands.  At baseline he is non-verbal.  


Cranial nerves: Manually opened his eyes and primary gaze is midline.  The 

pupils are round, equal and reactive to light.  No facial weakness.  Otherwise 

rest is limited.  


Motor: The strength is limited and no movement noted.  Has decrease tone over 

wrist and hand bilaterally  Decrease bulk throughout..  


Cerebellum: Could not assess.


Sensation: Could not assess light touch.





- Labs


CBC & Chem 7: 


                                 12/05/21 04:50





                                 12/05/21 04:50


Labs: 


                  Abnormal Lab Results - Last 24 Hours (Table)











  12/04/21 12/04/21 12/05/21 Range/Units





  07:58 07:58 00:21 


 


RBC  2.12 L    (4.40-5.60)  X 10*6/uL


 


Hgb  7.1 L    (13.0-17.0)  g/dL


 


Hct  24.6 L    (39.6-50.0)  %


 


MCV  116.0 H    (80.0-97.0)  fL


 


MCH  33.5 H    (27.0-32.0)  pg


 


MCHC  28.9 L    (32.0-37.0)  g/dL


 


RDW  17.5 H    (11.5-14.5)  %


 


Eosinophils #  0.02 L    (0.04-0.35)  X 10*3/uL


 


Macrocytosis     


 


Sodium   150 H   (135-145)  mmol/L


 


Chloride   121 H   ()  mmol/L


 


Carbon Dioxide   17.2 L   (20.0-27.5)  mmol/L


 


Creatinine     (0.66-1.25)  mg/dL


 


Glucose   68 L   ()  mg/dL


 


POC Glucose (mg/dL)    71 L  (75-99)  mg/dL


 


Calcium   8.0 L   (8.7-10.3)  mg/dL


 


Total Protein     (6.3-8.2)  g/dL


 


Albumin     (3.5-5.0)  g/dL














  12/05/21 12/05/21 Range/Units





  04:50 04:50 


 


RBC  2.29 L   (4.40-5.60)  X 10*6/uL


 


Hgb  8.4 L   (13.0-17.0)  g/dL


 


Hct  24.9 L   (39.6-50.0)  %


 


MCV  108.8 H   (80.0-97.0)  fL


 


MCH  36.8 H   (27.0-32.0)  pg


 


MCHC    (32.0-37.0)  g/dL


 


RDW  17.9 H   (11.5-14.5)  %


 


Eosinophils #    (0.04-0.35)  X 10*3/uL


 


Macrocytosis  Marked A   


 


Sodium    (135-145)  mmol/L


 


Chloride   118 H  ()  mmol/L


 


Carbon Dioxide   18 L  (20.0-27.5)  mmol/L


 


Creatinine   1.27 H  (0.66-1.25)  mg/dL


 


Glucose   72 L  ()  mg/dL


 


POC Glucose (mg/dL)    (75-99)  mg/dL


 


Calcium    (8.7-10.3)  mg/dL


 


Total Protein   5.6 L  (6.3-8.2)  g/dL


 


Albumin   2.9 L  (3.5-5.0)  g/dL








                      Microbiology - Last 24 Hours (Table)











 12/03/21 17:19 Blood Culture - Preliminary





 Blood    No Growth after 24 hours














Assessment and Plan


Assessment: 





Status epilepticus


Medical Refractory seizure (on numerous antiepileptic drugs.  At baseline has 5-

7 seizures per day).


Chronic anemia.  Rule out acute GI bleed.


Autism


Severe Developmental delay (non-verbal)


History of peptic ulcer disease


PEG tube


Tracheostomy





Plan: 





Continue his home medication of Keppra 14 mm by PEG tube he times a day.  Vimpat

200 mg once he times a day, clonazepam 20 mg 1 tablet twice a day, Clobazam 20mg

1 tab bid, CBD capsule 750mg daily, Banzel 1200mg daily and 1600mg qhs.


Also added Depakote 500mg every 12 hours.


Family would like to pursue with comfort care measures.  They do not want any IV

sedation, do want any ventilator.





The plan is discussed with the nurse.


Neurology will sign off.  Please reconsult if needed.





Chan Brewster MD


Neuro-Hospitalist








Time with Patient: Less than 30

## 2021-12-06 VITALS — TEMPERATURE: 100 F

## 2021-12-06 VITALS — SYSTOLIC BLOOD PRESSURE: 120 MMHG | DIASTOLIC BLOOD PRESSURE: 83 MMHG

## 2021-12-06 VITALS — RESPIRATION RATE: 16 BRPM

## 2021-12-06 RX ADMIN — LEVETIRACETAM SCH MG: 100 SOLUTION ORAL at 17:44

## 2021-12-06 RX ADMIN — LACOSAMIDE SCH MG: 50 TABLET, FILM COATED ORAL at 07:42

## 2021-12-06 RX ADMIN — LEVETIRACETAM SCH MG: 100 SOLUTION ORAL at 21:04

## 2021-12-06 RX ADMIN — MORPHINE SULFATE SCH: 50 INJECTION, SOLUTION, CONCENTRATE INTRAVENOUS at 19:29

## 2021-12-06 RX ADMIN — VALPROATE SODIUM SCH MLS/HR: 100 INJECTION, SOLUTION INTRAVENOUS at 08:44

## 2021-12-06 RX ADMIN — LEVETIRACETAM SCH MG: 100 SOLUTION ORAL at 07:42

## 2021-12-06 RX ADMIN — ACETAMINOPHEN PRN MG: 650 SUPPOSITORY RECTAL at 09:01

## 2021-12-06 RX ADMIN — VALPROATE SODIUM SCH MLS/HR: 100 INJECTION, SOLUTION INTRAVENOUS at 21:04

## 2021-12-06 RX ADMIN — LACOSAMIDE SCH MG: 50 TABLET, FILM COATED ORAL at 21:04

## 2021-12-06 RX ADMIN — LACOSAMIDE SCH MG: 50 TABLET, FILM COATED ORAL at 17:47

## 2021-12-06 RX ADMIN — ACETAMINOPHEN PRN MG: 650 SUPPOSITORY RECTAL at 17:43

## 2021-12-06 NOTE — P.PN
Subjective


Progress Note Date: 12/06/21


This is a 31-year-old male with autism and developmental delay, chronic 

contractures that was brought in by his aunt on 12/04/2021 for concern of dark 

discoloration and residuals in his PEG tube.  Denied nausea vomiting.  Patient 

with a past medical history significant for long-standing seizures on multiple 

medications, he has a PEG tube and tracheostomy as well.  Patient's aunt is also

his legal guardian.  There was not any clinical evidence of GI bleed at this 

time, his PEG tube was flushed and it was clear.  He does have chronic 

constipation.  Patient is nonverbal and was unable to obtain any History from 

the patient.  Labs on admission include white count of 7.2, hemoglobin 9.6, 

platelet count 359, INR is 0.8, sodium 144, potassium 3.8, chloride 114, BUN 19,

creatinine 1.05, sugars are in the 80s, lactic acid 0.9, AST 26, ALT 14, albumin

6.2, B12 644 and folate less than 20.  Gastric occult was positive and Covid PCR

was not detected.  He was evaluated by GI services who recommended Protonix 40 

mg daily and did not recommend endoscopic evaluation at this time.  Hemoglobin 

was stable this admission, dropped to 7.1 and then resolved up to 8.4.  Patient 

was hydrated. There was a rapid response conducted early morning on 12/05/2021, 

the patient was having ongoing seizures that was most likely status epilepticus,

as the patient was having 10 seizures about 3 minutes apart lasting 20-30 

seconds; more frequent.  Patient was started on Depakote, given IV push Ativan 

and because of the frequency of seizures was transferred to ICU.  Versed 

infusion was ordered as well as mechanical ventilation.  Patient's caregiver and

legal guardian refused infusion and intubation and they wished to proceed with 

comfort care and hospice measures.  Patient generally has about 5-7 seizures per

day at baseline.  Neurology has signed off the case as family would like to 

pursue comfort measures at this time. Patient was subsequently opened with 

hospice inpatient and transerred out of the ICU to the medical floor. Patient 

apparently had 4 seizures in a row upon arrival to the unit despite receiving 

Keppra, Vimpat, Klonopin, Valium, Ativan, and Banzel for seizure control. 

Patient was then started on ativan Gtt in addition to morphine Gtt. 


Vitals reviewed: Temp 102.6 axillary, heart rate 130, respirations 38, blood 

pressure 92/40, oxygen saturation 92% on 15L oxygen.





12/06/2021





Patient evaluated today resting in bed. He is not responsive to voice, which is 

his baseline. He continues on an IV morphine gtt, respirations are around 10 

breaths per minute, blood pressure 120/84, he is febrile with a temp of 106.7 

temporal. He does have ice packs in his axilla and received a rectal tylenol t

deya. He does not appear to be in any distress, and he is being turned in the 

bed with pillow support. 





REVIEW OF SYSTEMS: 


Unable to obtain due to his clinical condition





PHYSICAL EXAMINATION: 





GENERAL: Thin built with chronic contractures .


HEENT: Pupils are round and equally reacting to light. EOMI. No scleral icterus.

No conjunctival pallor. Normocephalic, atraumatic. No pharyngeal erythema. No 

thyromegaly. 


CARDIOVASCULAR: S1 and S2 present. No murmurs, rubs, or gallops. 


PULMONARY: There are some coarse lung sounds scattered posteriorly 


ABDOMEN: Soft, nontender, nondistended, No palpable organomegaly.  PEG tube in 

place sluggish bowel sounds


MUSCULOSKELETAL: No joint swelling or deformity.


EXTREMITIES: No cyanosis, clubbing, or pedal edema. 


NEUROLOGICAL: Diffuse muscle atrophy and chronic contractures


SKIN: No rashes. 





Assessment and Plan


Assessment 


-Status Epilepticus


-Refractory seizures for which patient is on multiple medications


-Ruled out GI bleed


-Chronic anemia, B12 and Folate WNL


-Autism, with severe developmental delay; nonverbal 


-Chronic contractures: Supportive care 


-Peg Tube


-Tracheostomy





Plan


Continue with Morphine gtt and IVP morphine for breakthrough


Continue with Ativan gtt with IVP ativan for breakthrough seizures


Continue with vimpat, keppra, valium, klonopin


Continue with IVPB Depaoke


Continue with all other supportive care and comfort measures.





Titrate medications per protocol in response to tachycardia/tachypnea


Goal respiratory rate between 10-15 breaths per minute


Wean oxygen as tolerated 





Objective





- Vital Signs


Vital signs: 


                                   Vital Signs











Temp  106.7 F H  12/06/21 08:47


 


Pulse  130 H  12/05/21 15:20


 


Resp  14   12/06/21 08:47


 


BP  120/83   12/06/21 08:47


 


Pulse Ox  98   12/06/21 08:47








                                 Intake & Output











 12/05/21 12/06/21 12/06/21





 18:59 06:59 18:59


 


Intake Total 135.989 394.917 


 


Output Total 30 500 


 


Balance 105.989 -105.083 


 


Weight 54.8 kg  54.8 kg


 


Intake:   


 


  IV 75 240 


 


    0.9% Sodium Chloride 75 240 


 


  Intake, IV Titration 1.989 154.917 





  Amount   


 


    LORazepam Vial 25 mg In  54.917 





    Dextrose 5% in Water 238   





    ml @ Per Protocol IV .Q0M   





    [facility] Rx#:[id number]   


 


    Morphine Sulfate (100 mg/ 1.989  





    2 ml) 100 mg In Sodium   





    Chloride 0.9% 100 ml @ 1   





    MG/HR 1.02 mls/hr IV .   





    Q24H [facility] Rx#:[id number]   


 


    Valproate Sodium 500 mg  100 





    In Sodium Chloride 0.9%   





    100 ml @ 100 mls/hr IVPB   





    BID [facility] Rx#:[id number]   


 


  Oral  0 


 


  Tube Feeding 59  


 


Output:   


 


  Urine 30 500 


 


Other:   


 


  Voiding Method External Catheter External Catheter External Catheter

## 2021-12-07 NOTE — P.DS
Providers


Date of admission: 


21 14:26





Attending physician: 


Guillaume Murry





Primary care physician: 


Geneva Hinojosa





[facility] Course: 


Final Diagnosis


-Status Epilepticus


-Refractory seizures for which patient is on multiple medications


-Ruled out GI bleed


-Chronic anemia, B12 and Folate WNL


-Autism, with severe developmental delay; nonverbal 


-Chronic contractures: Supportive care 


-Peg Tube


-Tracheostomy





Patient evaluated for GI bleed initially during this hospital stay which was a 

negative work up. He was evaluated by neurology after an apparent episode of 

status epilepticus with increasing seizure activity this hospital stay despite 

his home medications being resumed. Neurology recommendation during rapid 

response was to intubate the patient and began IV sedation infusion, and pts 

Aunt and legal guardian refused intubation and wished to proceed with hospice 

and comfort care measures. Hospice evaluated the patient and recommended GIP. 

Patient was opened to inpatient hospice and transferred out of the ICU to the 

medical floor and  on 2021 under the care of Hospice services. 

Family has been updated.








Plan - Discharge Summary


New Discharge Prescriptions: 


No Action


   Lacosamide [Vimpat] 200 mg PEG/G-TUBE TID@1000,1700,0000


   polyethylene glycoL 3350 [Miralax] 17 gm PEG/G-TUBE TID@1000,1700,0000


   Folic Acid 1 mg PEG/G-TUBE DAILY@1000


   levETIRAcetam [Keppra Oral Solution] 14 ml PEG/G-TUBE TID@1000,1700,0000


   clonazePAM [KlonoPIN] 1 mg PEG/G-TUBE TID@1000,1700,0000


   Tamsulosin [Flomax] 0.4 mg PEG/G-TUBE DAILY@1000


   Dexlansoprazole [Dexilant] 60 mg PEG/G-TUBE DAILY@1000


   Cbd Capsule 750 mg PEG/G-TUBE HS@0000


   Lactulose 30 gm PO TID@1000,1700,0000


   Banzel 400mg 1,200 mg PEG/G-TUBE DAILY@1200


   Pyridoxine HCl (Vitamin B6) [Vitamin B-6] 100 mg PO HS@0000


   Banzel 400mg 1,600 mg PEG/G-TUBE HS@0000


   cloBAZam [Clobazam] 20 mg PO BID@1200,0000


   Vitamin B-6 Complex 1 tab PEG/G-TUBE DAILY@1000


   Scopolamine [Scopolamine 1 MG/72 HR patch] 1 patch TRANSDERM Q72H #10 patch


   Diazepam [Valium] 5 mg PO DAILY PRN


     PRN Reason: AGGRESSION


Discharge Medication List





Lacosamide [Vimpat] 200 mg PEG/G-TUBE TID@1000,1700,0000 16 [History]


Folic Acid 1 mg PEG/G-TUBE DAILY@1000 19 [History]


clonazePAM [KlonoPIN] 1 mg PEG/G-TUBE TID@1000,1700,0000 19 [History]


levETIRAcetam [Keppra Oral Solution] 14 ml PEG/G-TUBE TID@1000,1700,0000 

19 [History]


polyethylene glycoL 3350 [Miralax] 17 gm PEG/G-TUBE TID@1000,1700,0000 19 

[History]


Dexlansoprazole [Dexilant] 60 mg PEG/G-TUBE DAILY@1000 20 [History]


Tamsulosin [Flomax] 0.4 mg PEG/G-TUBE DAILY@1000 20 [History]


Cbd Capsule 750 mg PEG/G-TUBE HS@0000 20 [History]


Lactulose 30 gm PO TID@1000,1700,0000 20 [History]


Banzel 400mg 1,200 mg PEG/G-TUBE DAILY@1200 21 [History]


Banzel 400mg 1,600 mg PEG/G-TUBE HS@0000 21 [History]


Pyridoxine HCl (Vitamin B6) [Vitamin B-6] 100 mg PO HS@0000 21 [History]


Diazepam [Valium] 5 mg PO DAILY PRN 21 [History]


Vitamin B-6 Complex 1 tab PEG/G-TUBE DAILY@1000 21 [History]


cloBAZam [Clobazam] 20 mg PO BID@1200,0000 21 [History]


Scopolamine [Scopolamine 1 MG/72 HR patch] 1 patch TRANSDERM Q72H #10 patch 

21 [Rx]








Discharge Disposition: 





- Preliminary Cause of Death


Preliminary Cause of Death: Epilepsy
